# Patient Record
Sex: MALE | Race: OTHER | Employment: OTHER | URBAN - METROPOLITAN AREA
[De-identification: names, ages, dates, MRNs, and addresses within clinical notes are randomized per-mention and may not be internally consistent; named-entity substitution may affect disease eponyms.]

---

## 2017-06-01 ENCOUNTER — TELEPHONE (OUTPATIENT)
Dept: INTERNAL MEDICINE CLINIC | Age: 77
End: 2017-06-01

## 2017-06-01 DIAGNOSIS — Z01.00 EYE EXAM, ROUTINE: Primary | ICD-10-CM

## 2017-12-20 ENCOUNTER — OFFICE VISIT (OUTPATIENT)
Dept: INTERNAL MEDICINE CLINIC | Age: 77
End: 2017-12-20

## 2017-12-20 VITALS
BODY MASS INDEX: 25.48 KG/M2 | HEART RATE: 73 BPM | HEIGHT: 63 IN | OXYGEN SATURATION: 98 % | SYSTOLIC BLOOD PRESSURE: 120 MMHG | RESPIRATION RATE: 19 BRPM | WEIGHT: 143.8 LBS | DIASTOLIC BLOOD PRESSURE: 62 MMHG | TEMPERATURE: 98.4 F

## 2017-12-20 DIAGNOSIS — Z00.00 MEDICARE ANNUAL WELLNESS VISIT, SUBSEQUENT: Primary | ICD-10-CM

## 2017-12-20 DIAGNOSIS — Z13.31 SCREENING FOR DEPRESSION: ICD-10-CM

## 2017-12-20 DIAGNOSIS — R97.20 ABNORMAL PSA: ICD-10-CM

## 2017-12-20 DIAGNOSIS — Z13.39 SCREENING FOR ALCOHOLISM: ICD-10-CM

## 2017-12-20 DIAGNOSIS — R73.09 ELEVATED HEMOGLOBIN A1C: ICD-10-CM

## 2017-12-20 RX ORDER — METRONIDAZOLE 10 MG/G
GEL TOPICAL DAILY
COMMUNITY
End: 2017-12-20 | Stop reason: CLARIF

## 2017-12-20 RX ORDER — METRONIDAZOLE 7.5 MG/G
CREAM TOPICAL
COMMUNITY
End: 2022-09-05

## 2017-12-20 RX ORDER — DOXYCYCLINE 50 MG/1
50 TABLET ORAL
COMMUNITY
End: 2022-09-02 | Stop reason: ALTCHOICE

## 2017-12-20 RX ORDER — DOXYCYCLINE 100 MG/1
100 CAPSULE ORAL
COMMUNITY
End: 2017-12-20 | Stop reason: SDUPTHER

## 2017-12-20 NOTE — PROGRESS NOTES
Follow Up Visit    Guanaco Leija is a 65 y.o. male. he presents for Annual Wellness Visit  . The patient reports feeling well. No complaints. Reviewed the input of the PharmD's note. He will need follow up with urology given persistent elevation of PSA    Patient Active Problem List   Diagnosis Code    BPH (benign prostatic hyperplasia) N40.0         Prior to Admission medications    Medication Sig Start Date End Date Taking? Authorizing Provider   multivitamin (ONE A DAY) tablet Take 1 Tab by mouth daily. Historical Provider   aspirin delayed-release 81 mg tablet Take  by mouth daily. Historical Provider   psyllium (METAMUCIL) packet Take 1 packet by mouth daily. Historical Provider         Health Maintenance   Topic Date Due    ZOSTER VACCINE AGE 60>  06/27/1999    GLAUCOMA SCREENING Q2Y  08/27/2004    Pneumococcal 65+ Low/Medium Risk (1 of 2 - PCV13) 08/27/2004    Influenza Age 9 to Adult  08/01/2017    MEDICARE YEARLY EXAM  12/21/2018    DTaP/Tdap/Td series (2 - Td) 10/21/2025       Review of Systems   Constitutional: Negative. Respiratory: Negative. Cardiovascular: Negative. Visit Vitals    /62 (BP 1 Location: Right arm, BP Patient Position: Sitting)    Pulse 73    Temp 98.4 °F (36.9 °C) (Oral)    Resp 19    Ht 5' 3\" (1.6 m)    Wt 143 lb 12.8 oz (65.2 kg)    SpO2 98%    BMI 25.47 kg/m2       Physical Exam   Constitutional: He appears well-nourished. Cardiovascular: Normal rate and intact distal pulses. Pulmonary/Chest: Effort normal and breath sounds normal.         ASSESSMENT/PLAN    Diagnoses and all orders for this visit:    1. Medicare annual wellness visit, subsequent  -     METABOLIC PANEL, COMPREHENSIVE  -     CBC WITH AUTOMATED DIFF  -     LIPID PANEL    2. Elevated hemoglobin A1c  -     HEMOGLOBIN A1C WITH EAG    3. Abnormal PSA  -     PROSTATE SPECIFIC AG    4. Screening for alcoholism    5.  Screening for depression  -     Depression Screen Annual        Follow-up Disposition:  Return in about 6 months (around 6/20/2018) for Follow up.

## 2017-12-20 NOTE — PATIENT INSTRUCTIONS
Medicare Part B Preventive Services Limitations Recommendation Scheduled   Cardiovascular Screening Blood Tests (every 5 years)  Total cholesterol, HDL, Triglycerides Order as a panel if possible Last: 11/4/16    Every Year Ordered today   Colorectal Cancer Screening  -Fecal occult blood test (annual)  -Flexible sigmoidoscopy (5y)  -Screening colonoscopy (10y)  -Barium Enema  Last: 8/18/2010    Recommended 10 year repeat Next: 8/2020   Diabetes Screening Tests (at least every 3 years, Medicare covers annually or at 6-month intervals for prediabetic patients)    Fasting blood sugar (FBS) or glucose tolerance test (GTT) Patient must be diagnosed with one of the following:  -Hypertension, Dyslipidemia, obesity, previous impaired FBS or GTT  Or any two of the following: overweight, FH of diabetes, age ? 72, history of gestational diabetes, birth of baby weighing more than 9 pounds Last: 11/4/16    Every year Ordered today   Glaucoma Screening (no USPSTF recommendation) Diabetes mellitus, family history, , age 48 or over,  American, age 72 or over   Every year Per patient went in the last 2 months   Seasonal Influenza Vaccination (annually)    Every Fall Completed   Shingles Vaccination  A Shingrix vaccine series is recommended once in a lifetime after age 61 Please get one at your pharmacy once this is available   Pneumococcal Vaccination (a Prevnar-13 and a Pneumovax after 72)   A prevnar-13 vaccine and then a pneumovax one year later is recommended after you turn 65 Please get a Prevnar-13 vaccine at your pharmacy. Please make an appointment with one of our nurse navigators to discuss advanced medical planning.

## 2017-12-20 NOTE — PROGRESS NOTES
Dr. Nichole Lagunas referred , 1940, a 65 y.o. male for a Medicare Annual Wellness Visit (AWV). This is a Subsequent Medicare Annual Wellness Exam (AWV) (Performed 12 months after IPPE or effective date of Medicare Part B enrollment)    I have reviewed the patient's medical history in detail and updated the computerized patient record. History     Past Medical History:   Diagnosis Date    Snoring       Past Surgical History:   Procedure Laterality Date    HX COLONOSCOPY  8-    normal - return in 10 years    HX VASECTOMY  1983     Current Outpatient Prescriptions   Medication Sig Dispense Refill    metroNIDAZOLE (METROCREAM) 0.75 % topical cream Apply  to affected area daily. Use a thin layer to affected areas after washing      doxycycline (ADOXA) 50 mg tablet Take 50 mg by mouth every seven (7) days.  multivitamin (ONE A DAY) tablet Take 0.5 Tabs by mouth daily.  aspirin delayed-release 81 mg tablet Take 81 mg by mouth daily.  psyllium (METAMUCIL) packet Take 1 Packet by mouth every other day. Allergies   Allergen Reactions    Other Food Other (comments)     Black pepper causes skin problems    Alcohol Other (comments)     Rosacea    Venom-Honey Bee Hives     Family History   Problem Relation Age of Onset    Diabetes Father     Heart Disease Father     Hypertension Father     Diabetes Sister     Hypertension Sister      Social History   Substance Use Topics    Smoking status: Never Smoker    Smokeless tobacco: Never Used    Alcohol use No     Patient Active Problem List   Diagnosis Code    BPH (benign prostatic hyperplasia) N40.0       Depression Risk Factor Screening:     PHQ over the last two weeks 12/20/2017   Little interest or pleasure in doing things Not at all   Feeling down, depressed or hopeless Not at all   Total Score PHQ 2 0     Alcohol Risk Factor Screening: You do not drink alcohol or very rarely.     Functional Ability and Level of Safety:   Hearing Loss  Hearing is good. Activities of Daily Living  The home contains: no safety equipment. Patient does total self care    Fall Risk  Fall Risk Assessment, last 12 mths 12/20/2017   Able to walk? Yes   Fall in past 12 months? No       Abuse Screen  Patient is not abused    Cognitive Screening   Evaluation of Cognitive Function:  Has your family/caregiver stated any concerns about your memory: no    Patient Care Team   Patient Care Team:  Liu Canales MD as PCP - General (Internal Medicine)  Liu Canales MD (Internal Medicine)  Maya House MD (Gastroenterology)  Jennifer Shay MD (Dermatology)    Assessment/Plan   Education and counseling provided:  Are appropriate based on today's review and evaluation  End-of-Life planning (with patient's consent)  Pneumococcal Vaccine  Colorectal cancer screening tests  Cardiovascular screening blood test  Screening for glaucoma  Diabetes screening test   Shingles vaccine    Diagnoses and all orders for this visit:    1. Medicare annual wellness visit, subsequent  -     METABOLIC PANEL, COMPREHENSIVE  -     CBC WITH AUTOMATED DIFF  -     LIPID PANEL    2. Elevated hemoglobin A1c  -     HEMOGLOBIN A1C WITH EAG    3. Abnormal PSA  -     PROSTATE SPECIFIC AG    4. Screening for alcoholism    5. Screening for depression  -     Depression Screen Annual    6. Patient did no} bring in their medications to the visit. During the patient interview,  the following was noted:  ASA:  Patient states that he takes this infrequently  MVI:  Patient states that he takes 1/2 tablet daily. He feels like he eats a balanced diet that is high in fruits and vegetables  Metronidazole 0.75% cream:  Patient states he applies this daily as needed when his rosacea flares. Doxycycline 50 mg:  Patient states he will use this when his rosacea flares, only uses this once a week, but if it worsens will take more frequently. He feels like this works well for his rosacea    7.   Screenings (see patient instructions for chart):  PSA: ordered today  Colonoscopy: Up to date, due for repeat in 8/2020  Glaucoma screening/Eye exam: Patient states that he went to [facility] in the last 2 weeks, sent fax to get OV note  Lipid panel: Due, ordered today  Diabetes: Due, ordered today    8. Immunizations:  Pneumococcal:  Recommended that patient receive Prevnar-13 followed by PPSV23 1 year later. Influenza:  Completed  Zoster:  Recommended that patient receive the Shingrix series once it is available at their pharmacy. Tdap:  Completed    9. Advanced Care Planning:  Patient does not currently has advanced care plan. Recommended that they set up an appointment with [facility] to discuss. Patient verbalized understanding of information presented. Answered all of the patient's questions. AVS handed and reviewed with patient.     Horris Pallas, PharmD, BCACP    Health Maintenance Due   Topic Date Due    ZOSTER VACCINE AGE 60>  06/27/1999    GLAUCOMA SCREENING Q2Y  08/27/2004    Pneumococcal 65+ Low/Medium Risk (1 of 2 - PCV13) 08/27/2004

## 2017-12-20 NOTE — MR AVS SNAPSHOT
Visit Information Date & Time Provider Department Dept. Phone Encounter #  
 12/20/2017 11:15 AM Annalisa Velarde MD [facility] Internal Medicine [phone] [id number] Upcoming Health Maintenance Date Due ZOSTER VACCINE AGE 60> 6/27/1999 GLAUCOMA SCREENING Q2Y 8/27/2004 Pneumococcal 65+ Low/Medium Risk (1 of 2 - PCV13) 8/27/2004 Influenza Age 5 to Adult 8/1/2017 MEDICARE YEARLY EXAM 12/21/2018 DTaP/Tdap/Td series (2 - Td) 10/21/2025 Allergies as of 12/20/2017  Review Complete On: 12/20/2017 By: Faisal Griffith PHARMD  
  
 Severity Noted Reaction Type Reactions Other Food  12/17/2014    Other (comments) Black pepper causes skin problems Alcohol  12/17/2014    Other (comments) Rosacea Venom-honey Bee  03/17/2015    Hives Current Immunizations  Reviewed on 12/20/2017 Name Date Influenza High Dose Vaccine PF 10/21/2017, 10/15/2014 Td 1/1/2014 Tdap 1/1/2014 Reviewed by Faisal Griffith PHARMD on 12/20/2017 at 12:40 PM  
You Were Diagnosed With   
  
 Codes Comments Medicare annual wellness visit, subsequent    -  Primary ICD-10-CM: Z00.00 ICD-9-CM: V70.0 Elevated hemoglobin A1c     ICD-10-CM: R73.09 
ICD-9-CM: 790.29 Abnormal PSA     ICD-10-CM: R97.20 ICD-9-CM: 795.89 Vitals BP Pulse Temp Resp Height(growth percentile) Weight(growth percentile) 120/62 (BP 1 Location: Right arm, BP Patient Position: Sitting) 73 98.4 °F (36.9 °C) (Oral) 19 5' 3\" (1.6 m) 143 lb 12.8 oz (65.2 kg) SpO2 BMI Smoking Status 98% 25.47 kg/m2 Never Smoker Vitals History BMI and BSA Data Body Mass Index Body Surface Area  
 25.47 kg/m 2 1.7 m 2 Preferred Pharmacy Pharmacy Name Phone [id number] [address] Sol[address], [address] [facility] [phone] Your Updated Medication List  
  
   
This list is accurate as of: 12/20/17 12:51 PM.  Always use your most recent med list.  
  
  
  
  
 aspirin delayed-release 81 mg tablet Take  by mouth daily. doxycycline 100 mg capsule Commonly known as:  Edjake Romance Take 100 mg by mouth every seven (7) days. During rosacea flare-up METROGEL 1 % topical gel Generic drug:  metroNIDAZOLE Apply  to affected area daily. Use a thin layer to affected areas after washing  
  
 multivitamin tablet Commonly known as:  ONE A DAY Take 0.5 Tabs by mouth daily. psyllium packet Commonly known as:  METAMUCIL Take 1 Packet by mouth every other day. We Performed the Following CBC WITH AUTOMATED DIFF [43328 CPT(R)] HEMOGLOBIN A1C WITH EAG [15524 CPT(R)] LIPID PANEL [78532 CPT(R)] METABOLIC PANEL, COMPREHENSIVE [31172 CPT(R)] PSA, DIAGNOSTIC (PROSTATE SPECIFIC AG) [id number] CPT(R)] Patient Instructions Medicare Part B Preventive Services Limitations Recommendation Scheduled Cardiovascular Screening Blood Tests (every 5 years) Total cholesterol, HDL, Triglycerides Order as a panel if possible Last: 11/4/16 Every Year Ordered today Colorectal Cancer Screening 
-Fecal occult blood test (annual) -Flexible sigmoidoscopy (5y) 
-Screening colonoscopy (10y) -Barium Enema  Last: 8/18/2010 Recommended 10 year repeat Next: 8/2020 Diabetes Screening Tests (at least every 3 years, Medicare covers annually or at 6-month intervals for prediabetic patients) Fasting blood sugar (FBS) or glucose tolerance test (GTT) Patient must be diagnosed with one of the following: 
-Hypertension, Dyslipidemia, obesity, previous impaired FBS or GTT 
Or any two of the following: overweight, FH of diabetes, age ? 72, history of gestational diabetes, birth of baby weighing more than 9 pounds Last: 11/4/16 Every year Ordered today Glaucoma Screening (no USPSTF recommendation) Diabetes mellitus, family history, , age 48 or over,  American, age 72 or over Every year Per patient went in the last 2 months Seasonal Influenza Vaccination (annually) Every Fall Completed Shingles Vaccination  A Shingrix vaccine series is recommended once in a lifetime after age 61 Please get one at your pharmacy once this is available Pneumococcal Vaccination (a Prevnar-13 and a Pneumovax after 65)   A prevnar-13 vaccine and then a pneumovax one year later is recommended after you turn 65 Please get a Prevnar-13 vaccine at your pharmacy. Please make an appointment with one of our nurse navigators to discuss advanced medical planning. Introducing [facility]! [employer] introduces Socialite patient portal. Now you can access parts of your medical record, email your doctor's office, and request medication refills online. 1. In your internet browser, go to [email or web address] 2. Click on the First Time User? Click Here link in the Sign In box. You will see the New Member Sign Up page. 3. Enter your Socialite Access Code exactly as it appears below. You will not need to use this code after youve completed the sign-up process. If you do not sign up before the expiration date, you must request a new code. · Socialite Access Code: [email or web address] Expires: 3/20/2018 12:51 PM 
 
4. Enter the last four digits of your Social Security Number (xxxx) and Date of Birth (mm/dd/yyyy) as indicated and click Submit. You will be taken to the next sign-up page. 5. Create a Socialite ID. This will be your Socialite login ID and cannot be changed, so think of one that is secure and easy to remember. 6. Create a Socialite password. You can change your password at any time. 7. Enter your Password Reset Question and Answer. This can be used at a later time if you forget your password. 8. Enter your e-mail address. You will receive e-mail notification when new information is available in [address] 9. Click Sign Up. You can now view and download portions of your medical record. 10. Click the Download Summary menu link to download a portable copy of your medical information. If you have questions, please visit the Frequently Asked Questions section of the [employer] website. Remember, [employer] is NOT to be used for urgent needs. For medical emergencies, dial 911. Now available from your iPhone and Android! Please provide this summary of care documentation to your next provider. Your primary care clinician is listed as Patrick Redmond. If you have any questions after today's visit, please call [phone].

## 2017-12-21 ENCOUNTER — HOSPITAL ENCOUNTER (OUTPATIENT)
Dept: LAB | Age: 77
Discharge: HOME | End: 2017-12-21
Payer: MEDICARE

## 2017-12-21 PROCEDURE — 36415 COLL VENOUS BLD VENIPUNCTURE: CPT

## 2017-12-21 PROCEDURE — 84153 ASSAY OF PSA TOTAL: CPT

## 2017-12-21 PROCEDURE — 83036 HEMOGLOBIN GLYCOSYLATED A1C: CPT

## 2017-12-21 PROCEDURE — 80053 COMPREHEN METABOLIC PANEL: CPT

## 2017-12-21 PROCEDURE — 80061 LIPID PANEL: CPT

## 2017-12-21 PROCEDURE — 85025 COMPLETE CBC W/AUTO DIFF WBC: CPT

## 2017-12-22 LAB
ALBUMIN SERPL-MCNC: 4.5 G/DL (ref 3.5–4.8)
ALBUMIN/GLOB SERPL: 1.7 {RATIO} (ref 1.2–2.2)
ALP SERPL-CCNC: 59 IU/L (ref 39–117)
ALT SERPL-CCNC: 24 IU/L (ref 0–44)
AST SERPL-CCNC: 23 IU/L (ref 0–40)
BASOPHILS # BLD AUTO: 0 X10E3/UL (ref 0–0.2)
BASOPHILS NFR BLD AUTO: 1 %
BILIRUB SERPL-MCNC: 0.5 MG/DL (ref 0–1.2)
BUN SERPL-MCNC: 13 MG/DL (ref 8–27)
BUN/CREAT SERPL: 12 (ref 10–24)
CALCIUM SERPL-MCNC: 9.8 MG/DL (ref 8.6–10.2)
CHLORIDE SERPL-SCNC: 101 MMOL/L (ref 96–106)
CHOLEST SERPL-MCNC: 180 MG/DL (ref 100–199)
CO2 SERPL-SCNC: 24 MMOL/L (ref 18–29)
CREAT SERPL-MCNC: 1.11 MG/DL (ref 0.76–1.27)
EOSINOPHIL # BLD AUTO: 0.2 X10E3/UL (ref 0–0.4)
EOSINOPHIL NFR BLD AUTO: 4 %
ERYTHROCYTE [DISTWIDTH] IN BLOOD BY AUTOMATED COUNT: 12.2 % (ref 12.3–15.4)
EST. AVERAGE GLUCOSE BLD GHB EST-MCNC: 117 MG/DL
GLOBULIN SER CALC-MCNC: 2.6 G/DL (ref 1.5–4.5)
GLUCOSE SERPL-MCNC: 98 MG/DL (ref 65–99)
HBA1C MFR BLD: 5.7 % (ref 4.8–5.6)
HCT VFR BLD AUTO: 44.1 % (ref 37.5–51)
HDLC SERPL-MCNC: 36 MG/DL
HGB BLD-MCNC: 15.1 G/DL (ref 13–17.7)
IMM GRANULOCYTES # BLD: 0 X10E3/UL (ref 0–0.1)
IMM GRANULOCYTES NFR BLD: 0 %
LDLC SERPL CALC-MCNC: 107 MG/DL (ref 0–99)
LYMPHOCYTES # BLD AUTO: 2 X10E3/UL (ref 0.7–3.1)
LYMPHOCYTES NFR BLD AUTO: 38 %
MCH RBC QN AUTO: 31.2 PG (ref 26.6–33)
MCHC RBC AUTO-ENTMCNC: 34.2 G/DL (ref 31.5–35.7)
MCV RBC AUTO: 91 FL (ref 79–97)
MONOCYTES # BLD AUTO: 0.6 X10E3/UL (ref 0.1–0.9)
MONOCYTES NFR BLD AUTO: 12 %
NEUTROPHILS # BLD AUTO: 2.4 X10E3/UL (ref 1.4–7)
NEUTROPHILS NFR BLD AUTO: 45 %
PLATELET # BLD AUTO: 189 X10E3/UL (ref 150–379)
POTASSIUM SERPL-SCNC: 4.6 MMOL/L (ref 3.5–5.2)
PROT SERPL-MCNC: 7.1 G/DL (ref 6–8.5)
PSA SERPL-MCNC: 7.6 NG/ML (ref 0–4)
RBC # BLD AUTO: 4.84 X10E6/UL (ref 4.14–5.8)
SODIUM SERPL-SCNC: 140 MMOL/L (ref 134–144)
TRIGL SERPL-MCNC: 187 MG/DL (ref 0–149)
VLDLC SERPL CALC-MCNC: 37 MG/DL (ref 5–40)
WBC # BLD AUTO: 5.3 X10E3/UL (ref 3.4–10.8)

## 2017-12-28 ENCOUNTER — DOCUMENTATION ONLY (OUTPATIENT)
Dept: INTERNAL MEDICINE CLINIC | Age: 77
End: 2017-12-28

## 2018-05-09 ENCOUNTER — OFFICE VISIT (OUTPATIENT)
Dept: INTERNAL MEDICINE CLINIC | Age: 78
End: 2018-05-09

## 2018-05-09 VITALS
TEMPERATURE: 97.7 F | DIASTOLIC BLOOD PRESSURE: 58 MMHG | WEIGHT: 142 LBS | BODY MASS INDEX: 25.16 KG/M2 | SYSTOLIC BLOOD PRESSURE: 140 MMHG | OXYGEN SATURATION: 97 % | RESPIRATION RATE: 17 BRPM | HEART RATE: 67 BPM | HEIGHT: 63 IN

## 2018-05-09 DIAGNOSIS — M79.18 GLUTEAL PAIN: Primary | ICD-10-CM

## 2018-05-09 RX ORDER — ACETAMINOPHEN 500 MG
TABLET ORAL
COMMUNITY
End: 2021-06-16

## 2018-05-09 RX ORDER — CYCLOBENZAPRINE HCL 5 MG
5 TABLET ORAL
Qty: 30 TAB | Refills: 0 | Status: SHIPPED | OUTPATIENT
Start: 2018-05-09 | End: 2018-07-05 | Stop reason: ALTCHOICE

## 2018-05-09 NOTE — PATIENT INSTRUCTIONS
For now, please take the flexeril and Alleve twice a day (two pills at a time) for the next 7 days. Let me know if not improving.

## 2018-05-09 NOTE — MR AVS SNAPSHOT
[facility] [address] [place] [address] 
[phone] Patient: Sunil Hutchins MRN: [id number] :1940 Visit Information Date & Time Provider Department Dept. Phone Encounter #  
 2018 11:45 AM Anca Castillo MD [address] [address] Internal Medicine [phone] [id number] Upcoming Health Maintenance Date Due ZOSTER VACCINE AGE 60> 1999 GLAUCOMA SCREENING Q2Y 2004 Pneumococcal 65+ Low/Medium Risk (1 of 2 - PCV13) 2004 Influenza Age 5 to Adult 2018 MEDICARE YEARLY EXAM 2018 DTaP/Tdap/Td series (2 - Td) 10/21/2025 Allergies as of 2018  Review Complete On: 2018 By: Anca Castillo MD  
  
 Severity Noted Reaction Type Reactions Other Food  2014    Other (comments) Black pepper causes skin problems Alcohol  2014    Other (comments) Rosacea Venom-honey Bee  2015    Hives Current Immunizations  Reviewed on 2017 Name Date Influenza High Dose Vaccine PF 10/21/2017, 10/15/2014 Td 2014 Tdap 2014 Not reviewed this visit You Were Diagnosed With   
  
 Codes Comments Gluteal pain    -  Primary ICD-10-CM: M79.1 ICD-9-CM: 729.1 Vitals BP Pulse Temp Resp Height(growth percentile) Weight(growth percentile) 140/58 (BP 1 Location: Right arm, BP Patient Position: Sitting) 67 97.7 °F (36.5 °C) (Oral) 17 5' 3\" (1.6 m) 142 lb (64.4 kg) SpO2 BMI Smoking Status 97% 25.15 kg/m2 Never Smoker Vitals History BMI and BSA Data Body Mass Index Body Surface Area  
 25.15 kg/m 2 1.69 m 2 Preferred Pharmacy Pharmacy Name Phone [id number] Mike Leija, [address] [facility] [phone] Your Updated Medication List  
  
   
This list is accurate as of 18 12:31 PM.  Always use your most recent med list.  
  
  
  
  
 aspirin delayed-release 81 mg tablet Take 81 mg by mouth daily. cyclobenzaprine 5 mg tablet Commonly known as:  FLEXERIL Take 1 Tab by mouth three (3) times daily as needed for Muscle Spasm(s). doxycycline 50 mg tablet Commonly known as:  ADOXA Take 50 mg by mouth every seven (7) days. During rosacea flares  
  
 metroNIDAZOLE 0.75 % topical cream  
Commonly known as:  METROCREAM  
Apply  to affected area daily as needed. Use a thin layer to affected areas after washing  
  
 multivitamin tablet Commonly known as:  ONE A DAY Take 0.5 Tabs by mouth daily. psyllium packet Commonly known as:  METAMUCIL Take 1 Packet by mouth every other day. TYLENOL EXTRA STRENGTH 500 mg tablet Generic drug:  acetaminophen Take  by mouth every six (6) hours as needed for Pain. Prescriptions Sent to Pharmacy Refills  
 cyclobenzaprine (FLEXERIL) 5 mg tablet 0 Sig: Take 1 Tab by mouth three (3) times daily as needed for Muscle Spasm(s). Class: Normal  
 Pharmacy: [facility]/pharmacy [address] [place] [address] #: [phone] Route: Oral  
  
Patient Instructions For now, please take the flexeril and Alleve twice a day (two pills at a time) for the next 7 days. Let me know if not improving. Introducing [facility]! Dear Lolis Manzo: Thank you for requesting a [employer] account. Our records indicate that you already have an active [employer] account. You can access your account anytime at [email or web address] Did you know that you can access your hospital and ER discharge instructions at any time in [employer]? You can also review all of your test results from your hospital stay or ER visit. Additional Information If you have questions, please visit the Frequently Asked Questions section of the [employer] website at [email or web address] Remember, MyChart is NOT to be used for urgent needs. For medical emergencies, dial 911. Now available from your iPhone and Android! Please provide this summary of care documentation to your next provider. Your primary care clinician is listed as Sonja Romano. If you have any questions after today's visit, please call [phone].

## 2018-07-05 ENCOUNTER — OFFICE VISIT (OUTPATIENT)
Dept: INTERNAL MEDICINE CLINIC | Age: 78
End: 2018-07-05

## 2018-07-05 VITALS
HEIGHT: 63 IN | WEIGHT: 141.2 LBS | BODY MASS INDEX: 25.02 KG/M2 | DIASTOLIC BLOOD PRESSURE: 68 MMHG | SYSTOLIC BLOOD PRESSURE: 130 MMHG | HEART RATE: 67 BPM | TEMPERATURE: 97 F | OXYGEN SATURATION: 98 % | RESPIRATION RATE: 16 BRPM

## 2018-07-05 DIAGNOSIS — M70.71 BURSITIS OF RIGHT HIP, UNSPECIFIED BURSA: ICD-10-CM

## 2018-07-05 DIAGNOSIS — L03.031 PARONYCHIA, TOE, RIGHT: Primary | ICD-10-CM

## 2018-07-05 DIAGNOSIS — N28.1 BILATERAL RENAL CYSTS: ICD-10-CM

## 2018-07-05 RX ORDER — MELOXICAM 15 MG/1
15 TABLET ORAL
COMMUNITY
Start: 2018-06-21 | End: 2021-06-16

## 2018-07-05 NOTE — PROGRESS NOTES
Acute Care Note    Gerhard Schultz is 65 y.o. male. he presents for evaluation of Bursitis and Cyst    The patient comes for discussion of his recent orthopedics evaluation. He has been seen by [address] [place] [address] for the previous nearly one month for continuous hip pain on the right. He tells me that initially, he was prescribed physical therapy which did not seem to help him much. Indeed, he appeared to worsen. An MRI of the lumbar spine was performed which documented evidence of spinal stenosis present at the levels of L4-L5. Based upon this, the orthopedic office has recommended continued physical therapy. However, they are considering an injection of the lumbar spine. Additionally, the patient does have evidence of a few small bilateral renal bright lesions on the T2 image per radiology. Other radiology read, these are thought to be consistent with cysts. The patient was very concerned about these and came to discuss them. He has no urologic issues presently. He does follow up with urology episodically for an elevated PSA. He has had biopsies which have not shown any abnormalities. His last interaction with urology was in 2011. He also mentions irritation to the right fifth toe. He tells me that he had seen podiatry for this already and had been prescribed an antibiotic cream it appears. He used the cream and had some improvement of the toe. However, he notes that this discomfort is still slightly present. Prior to Admission medications    Medication Sig Start Date End Date Taking? Authorizing Provider   meloxicam (MOBIC) 15 mg tablet Take 15 mg by mouth. 6/21/18  Yes Historical Provider   acetaminophen (TYLENOL EXTRA STRENGTH) 500 mg tablet Take  by mouth every six (6) hours as needed for Pain. Yes Historical Provider   multivitamin (ONE A DAY) tablet Take 0.5 Tabs by mouth daily. Yes Historical Provider   psyllium (METAMUCIL) packet Take 1 Packet by mouth every other day.    Yes Historical Provider   metroNIDAZOLE (METROCREAM) 0.75 % topical cream Apply  to affected area daily as needed. Use a thin layer to affected areas after washing     Historical Provider   doxycycline (ADOXA) 50 mg tablet Take 50 mg by mouth every seven (7) days. During rosacea flares    Historical Provider   aspirin delayed-release 81 mg tablet Take 81 mg by mouth daily. Historical Provider         Patient Active Problem List   Diagnosis Code    BPH (benign prostatic hyperplasia) N40.0         Review of Systems   Constitutional: Negative. Cardiovascular: Negative. Musculoskeletal:        Right hip pain as per HPI         Visit Vitals    /68 (BP 1 Location: Right arm, BP Patient Position: Sitting)    Pulse 67    Temp 97 °F (36.1 °C) (Oral)    Resp 16    Ht 5' 3\" (1.6 m)    Wt 141 lb 3.2 oz (64 kg)    SpO2 98%    BMI 25.01 kg/m2       Physical Exam   Constitutional: He appears well-developed and well-nourished. Musculoskeletal:   Mildly erythematous and swollen fifth toe on the right foot. This is mildly tender to palpation           ASSESSMENT/PLAN  Diagnoses and all orders for this visit:    1. Paronychia, toe, right -this appears to be mild at this time. Advised use of Neosporin gel on the site twice a day. He will monitor the toe    2. Bursitis of right hip, unspecified bursa -the patient will continue or to follow-up. He was advised to again continue as per the specialist recommendations. 3. Bilateral renal cysts -per the imaging report which the patient furnished to me today, these appear as if they are simple cysts. We will follow up with radiology to ensure they do not appear to be concerning. Advised the patient to call back or return to office if symptoms worsen/change/persist.   Discussed expected course/resolution/complications of diagnosis in detail with patient. Medication risks/benefits/costs/interactions/alternatives discussed with patient.    The patient was given an after visit summary which includes diagnoses, current medications, & vitals. They expressed understanding with the diagnosis and plan.

## 2018-07-09 ENCOUNTER — TELEPHONE (OUTPATIENT)
Dept: INTERNAL MEDICINE CLINIC | Age: 78
End: 2018-07-09

## 2018-07-09 DIAGNOSIS — Q61.02 MULTIPLE RENAL CYSTS: Primary | ICD-10-CM

## 2018-07-09 NOTE — TELEPHONE ENCOUNTER
----- Message from Andrew Norton MD sent at 7/6/2018  1:15 PM EDT -----  Please inform the patient that I spoke to the radiologist.  Although he does feel that the cysts are benign and not of any consequence. He did mention that we could rule this out entirely with a dedicated renal ultrasound. I suggest that we do this just to be sure that there is no kidney issue that needs attention. If patient agrees, please order bilateral renal ultrasound to evaluate renal cysts seen on MRI.        Patient has been informed per previous note

## 2018-07-13 ENCOUNTER — HOSPITAL ENCOUNTER (OUTPATIENT)
Dept: ULTRASOUND IMAGING | Age: 78
Discharge: HOME | End: 2018-07-13
Attending: INTERNAL MEDICINE
Payer: MEDICARE

## 2018-07-13 DIAGNOSIS — Q61.02 MULTIPLE RENAL CYSTS: ICD-10-CM

## 2018-07-13 PROCEDURE — 76770 US EXAM ABDO BACK WALL COMP: CPT

## 2018-07-23 ENCOUNTER — TELEPHONE (OUTPATIENT)
Dept: INTERNAL MEDICINE CLINIC | Age: 78
End: 2018-07-23

## 2018-07-23 NOTE — TELEPHONE ENCOUNTER
Patient asked that you fax kidney US results and latest labwork to Dr. Doug Lyons, urologist    Fax# [phone]    Appt on August 1st

## 2018-07-23 NOTE — TELEPHONE ENCOUNTER
Faxed testing results to US results and latest labwork to Dr. Santosh Flores, urologist     Fax# [phone]     Appt on August 1st

## 2019-07-30 ENCOUNTER — TELEPHONE (OUTPATIENT)
Dept: INTERNAL MEDICINE CLINIC | Age: 79
End: 2019-07-30

## 2019-07-30 DIAGNOSIS — Z00.00 ROUTINE GENERAL MEDICAL EXAMINATION AT A HEALTH CARE FACILITY: ICD-10-CM

## 2019-07-30 DIAGNOSIS — N40.0 BENIGN PROSTATIC HYPERPLASIA, UNSPECIFIED WHETHER LOWER URINARY TRACT SYMPTOMS PRESENT: Primary | ICD-10-CM

## 2019-07-30 DIAGNOSIS — Z00.00 ENCOUNTER FOR MEDICARE ANNUAL WELLNESS EXAM: ICD-10-CM

## 2019-07-30 NOTE — TELEPHONE ENCOUNTER
Patient has appt for a wellness visit in Sept and is requesting a labslip be put at the  -- he will get bloodwork done a week prior to the appt.

## 2019-09-17 ENCOUNTER — HOSPITAL ENCOUNTER (OUTPATIENT)
Dept: LAB | Age: 79
Discharge: HOME | End: 2019-09-17
Payer: MEDICARE

## 2019-09-17 PROCEDURE — 83036 HEMOGLOBIN GLYCOSYLATED A1C: CPT

## 2019-09-17 PROCEDURE — 80061 LIPID PANEL: CPT

## 2019-09-17 PROCEDURE — 85025 COMPLETE CBC W/AUTO DIFF WBC: CPT

## 2019-09-17 PROCEDURE — 80053 COMPREHEN METABOLIC PANEL: CPT

## 2019-09-17 PROCEDURE — 36415 COLL VENOUS BLD VENIPUNCTURE: CPT

## 2019-09-18 LAB
ALBUMIN SERPL-MCNC: 4.7 G/DL (ref 3.5–4.7)
ALBUMIN/GLOB SERPL: 2 {RATIO} (ref 1.2–2.2)
ALP SERPL-CCNC: 59 IU/L (ref 39–117)
ALT SERPL-CCNC: 20 IU/L (ref 0–44)
AST SERPL-CCNC: 22 IU/L (ref 0–40)
BASOPHILS # BLD AUTO: 0 X10E3/UL (ref 0–0.2)
BASOPHILS NFR BLD AUTO: 1 %
BILIRUB SERPL-MCNC: 0.6 MG/DL (ref 0–1.2)
BUN SERPL-MCNC: 10 MG/DL (ref 8–27)
BUN/CREAT SERPL: 10 (ref 10–24)
CALCIUM SERPL-MCNC: 9.7 MG/DL (ref 8.6–10.2)
CHLORIDE SERPL-SCNC: 101 MMOL/L (ref 96–106)
CHOLEST SERPL-MCNC: 197 MG/DL (ref 100–199)
CO2 SERPL-SCNC: 24 MMOL/L (ref 20–29)
CREAT SERPL-MCNC: 1.05 MG/DL (ref 0.76–1.27)
EOSINOPHIL # BLD AUTO: 0.2 X10E3/UL (ref 0–0.4)
EOSINOPHIL NFR BLD AUTO: 3 %
ERYTHROCYTE [DISTWIDTH] IN BLOOD BY AUTOMATED COUNT: 11.9 % (ref 12.3–15.4)
EST. AVERAGE GLUCOSE BLD GHB EST-MCNC: 120 MG/DL
GLOBULIN SER CALC-MCNC: 2.3 G/DL (ref 1.5–4.5)
GLUCOSE SERPL-MCNC: 102 MG/DL (ref 65–99)
HBA1C MFR BLD: 5.8 % (ref 4.8–5.6)
HCT VFR BLD AUTO: 45.9 % (ref 37.5–51)
HDLC SERPL-MCNC: 33 MG/DL
HGB BLD-MCNC: 15.8 G/DL (ref 13–17.7)
IMM GRANULOCYTES # BLD AUTO: 0 X10E3/UL (ref 0–0.1)
IMM GRANULOCYTES NFR BLD AUTO: 0 %
LDLC SERPL CALC-MCNC: 100 MG/DL (ref 0–99)
LYMPHOCYTES # BLD AUTO: 1.8 X10E3/UL (ref 0.7–3.1)
LYMPHOCYTES NFR BLD AUTO: 31 %
MCH RBC QN AUTO: 30.9 PG (ref 26.6–33)
MCHC RBC AUTO-ENTMCNC: 34.4 G/DL (ref 31.5–35.7)
MCV RBC AUTO: 90 FL (ref 79–97)
MONOCYTES # BLD AUTO: 0.6 X10E3/UL (ref 0.1–0.9)
MONOCYTES NFR BLD AUTO: 11 %
NEUTROPHILS # BLD AUTO: 3.2 X10E3/UL (ref 1.4–7)
NEUTROPHILS NFR BLD AUTO: 54 %
PLATELET # BLD AUTO: 197 X10E3/UL (ref 150–450)
POTASSIUM SERPL-SCNC: 4.5 MMOL/L (ref 3.5–5.2)
PROT SERPL-MCNC: 7 G/DL (ref 6–8.5)
PSA SERPL-MCNC: 6.5 NG/ML (ref 0–4)
RBC # BLD AUTO: 5.11 X10E6/UL (ref 4.14–5.8)
SODIUM SERPL-SCNC: 142 MMOL/L (ref 134–144)
TRIGL SERPL-MCNC: 320 MG/DL (ref 0–149)
VLDLC SERPL CALC-MCNC: 64 MG/DL (ref 5–40)
WBC # BLD AUTO: 5.8 X10E3/UL (ref 3.4–10.8)

## 2019-09-26 ENCOUNTER — OFFICE VISIT (OUTPATIENT)
Dept: INTERNAL MEDICINE CLINIC | Age: 79
End: 2019-09-26

## 2019-09-26 VITALS
HEIGHT: 63 IN | HEART RATE: 62 BPM | TEMPERATURE: 98.2 F | WEIGHT: 140.4 LBS | RESPIRATION RATE: 18 BRPM | DIASTOLIC BLOOD PRESSURE: 60 MMHG | OXYGEN SATURATION: 96 % | BODY MASS INDEX: 24.88 KG/M2 | SYSTOLIC BLOOD PRESSURE: 101 MMHG

## 2019-09-26 DIAGNOSIS — Z23 ENCOUNTER FOR IMMUNIZATION: ICD-10-CM

## 2019-09-26 DIAGNOSIS — Z00.00 MEDICARE ANNUAL WELLNESS VISIT, SUBSEQUENT: Primary | ICD-10-CM

## 2019-09-26 RX ORDER — FLUOCINONIDE 0.5 MG/G
CREAM TOPICAL
Refills: 0 | COMMUNITY
Start: 2019-08-16 | End: 2021-06-16

## 2019-09-26 RX ORDER — LATANOPROST 50 UG/ML
SOLUTION/ DROPS OPHTHALMIC
COMMUNITY
Start: 2019-08-20

## 2019-09-26 RX ORDER — HYDROXYZINE HYDROCHLORIDE 10 MG/1
TABLET, FILM COATED ORAL
Refills: 0 | COMMUNITY
Start: 2019-08-30 | End: 2021-06-16

## 2019-09-26 NOTE — PATIENT INSTRUCTIONS
Vaccine Information Statement Influenza (Flu) Vaccine (Inactivated or Recombinant): What You Need to Know Many Vaccine Information Statements are available in Estonian and other languages. See www.immunize.org/vis Hojas de información sobre vacunas están disponibles en español y en muchos otros idiomas. Visite www.immunize.org/vis 1. Why get vaccinated? Influenza vaccine can prevent influenza (flu). Flu is a contagious disease that spreads around the [facility] every year, usually between October and May. Anyone can get the flu, but it is more dangerous for some people. Infants and young children, people 72years of age and older, pregnant women, and people with certain health conditions or a weakened immune system are at greatest risk of flu complications. Pneumonia, bronchitis, sinus infections and ear infections are examples of flu-related complications. If you have a medical condition, such as heart disease, cancer or diabetes, flu can make it worse. Flu can cause fever and chills, sore throat, muscle aches, fatigue, cough, headache, and runny or stuffy nose. Some people may have vomiting and diarrhea, though this is more common in children than adults. Each year thousands of people in the [facility] die from flu, and many more are hospitalized. Flu vaccine prevents millions of illnesses and flu-related visits to the doctor each year. 2. Influenza vaccines CDC recommends everyone 10months of age and older get vaccinated every flu season. Children 6 months through 6years of age may need 2 doses during a single flu season. Everyone else needs only 1 dose each flu season. It takes about 2 weeks for protection to develop after vaccination. There are many flu viruses, and they are always changing. Each year a new flu vaccine is made to protect against three or four viruses that are likely to cause disease in the upcoming flu season.  Even when the vaccine doesnt exactly match these viruses, it may still provide some protection. Influenza vaccine does not cause flu. Influenza vaccine may be given at the same time as other vaccines. 3. Talk with your health care provider Tell your vaccine provider if the person getting the vaccine: 
 Has had an allergic reaction after a previous dose of influenza vaccine, or has any severe, life-threatening allergies.  Has ever had Guillain-Barré Syndrome (also called GBS). In some cases, your health care provider may decide to postpone influenza vaccination to a future visit. People with minor illnesses, such as a cold, may be vaccinated. People who are moderately or severely ill should usually wait until they recover before getting influenza vaccine. Your health care provider can give you more information. 4. Risks of a reaction  Soreness, redness, and swelling where shot is given, fever, muscle aches, and headache can happen after influenza vaccine.  There may be a very small increased risk of Guillain-Barré Syndrome (GBS) after inactivated influenza vaccine (the flu shot). Irl Pae children who get the flu shot along with pneumococcal vaccine (PCV13), and/or DTaP vaccine at the same time might be slightly more likely to have a seizure caused by fever. Tell your health care provider if a child who is getting flu vaccine has ever had a seizure. People sometimes faint after medical procedures, including vaccination. Tell your provider if you feel dizzy or have vision changes or ringing in the ears. As with any medicine, there is a very remote chance of a vaccine causing a severe allergic reaction, other serious injury, or death. 5. What if there is a serious problem? An allergic reaction could occur after the vaccinated person leaves the clinic.  If you see signs of a severe allergic reaction (hives, swelling of the face and throat, difficulty breathing, a fast heartbeat, dizziness, or weakness), call 9-1-1 and get the person to the nearest hospital. 
 
For other signs that concern you, call your health care provider. Adverse reactions should be reported to the Vaccine Adverse Event Reporting System (VAERS). Your health care provider will usually file this report, or you can do it yourself. Visit the VAERS website at www.vaers. hhs.gov or call 0-896.444.9219. VAERS is only for reporting reactions, and VAERS staff do not give medical advice. 6. The National Vaccine Injury Compensation Program 
 
The [facility] Vaccine Injury Compensation Program (VICP) is a federal program that was created to compensate people who may have been injured by certain vaccines. Visit the VICP website at www.[facility].gov/vaccinecompensation or call 9-477.336.1751 to learn about the program and about filing a claim. There is a time limit to file a claim for compensation. 7. How can I learn more?  Ask your health care provider.  Call your local or state health department.  Contact the Centers for Disease Control and Prevention (CDC): 
- Call 6-590.192.2692 (4-549-FLO-INFO) or 
- Visit CDCs influenza website at www.cdc.gov/flu Vaccine Information Statement (Interim) Inactivated Influenza Vaccine 8/15/2019 
[address] Orlinda Sandhoff 751AH-20 Department of Health and [employer] Centers for Disease Control and Prevention Office Use Only Medicare Wellness Visit, Male The best way to live healthy is to have a lifestyle where you eat a well-balanced diet, exercise regularly, limit alcohol use, and quit all forms of tobacco/nicotine, if applicable. Regular preventive services are another way to keep healthy. Preventive services (vaccines, screening tests, monitoring & exams) can help personalize your care plan, which helps you manage your own care. Screening tests can find health problems at the earliest stages, when they are easiest to treat. [address] [facility] follows the current, evidence-based guidelines published by the [facility] Oswald Medina ([facility]) when recommending preventive services for our patients. Because we follow these guidelines, sometimes recommendations change over time as research supports it. (For example, a prostate screening blood test is no longer routinely recommended for men with no symptoms.) Of course, you and your doctor may decide to screen more often for some diseases, based on your risk and co-morbidities (chronic disease you are already diagnosed with). Preventive services for you include: - Medicare offers their members a free annual wellness visit, which is time for you and your primary care provider to discuss and plan for your preventive service needs. Take advantage of this benefit every year! 
-All adults over age 72 should receive the recommended pneumonia vaccines. Current USPSTF guidelines recommend a series of two vaccines for the best pneumonia protection.  
-All adults should have a flu vaccine yearly and an ECG. All adults age 61 and older should receive a shingles vaccine once in their lifetime.   
-All adults age 38-68 who are overweight should have a diabetes screening test once every three years.  
-Other screening tests & preventive services for persons with diabetes include: an eye exam to screen for diabetic retinopathy, a kidney function test, a foot exam, and stricter control over your cholesterol.  
-Cardiovascular screening for adults with routine risk involves an electrocardiogram (ECG) at intervals determined by the provider.  
-Colorectal cancer screening should be done for adults age 54-65 with no increased risk factors for colorectal cancer. There are a number of acceptable methods of screening for this type of cancer. Each test has its own benefits and drawbacks.  Discuss with your provider what is most appropriate for you during your annual wellness visit. The different tests include: colonoscopy (considered the best screening method), a fecal occult blood test, a fecal DNA test, and sigmoidoscopy. 
-All adults born between [facility] should be screened once for Hepatitis C. 
-An Abdominal Aortic Aneurysm (AAA) Screening is recommended for men age 73-68 who has ever smoked in their lifetime. Here is a list of your current Health Maintenance items (your personalized list of preventive services) with a due date: 
Health Maintenance Due Topic Date Due  Shingles Vaccine (1 of 2) 08/27/1989  Pneumococcal Vaccine (1 of 2 - PCV13) 08/27/2004 [facility] Annual Well Visit  12/21/2018  Flu Vaccine  08/01/2019

## 2019-09-26 NOTE — PROGRESS NOTES
1. Have you been to the ER, urgent care clinic since your last visit? Hospitalized since your last visit? No    2. Have you seen or consulted any other health care providers outside of the [address] [place] [address] since your last visit? Include any pap smears or colon screening. Trish Healy Oksana Carroll is a [de-identified] y.o. male who presents for routine immunizations. He denies any symptoms , reactions or allergies that would exclude them from being immunized today. Risks and adverse reactions were discussed and the VIS was given to them. All questions were addressed. He was observed for 5 min post injection. There were no reactions observed.     Juan Jean

## 2019-09-26 NOTE — PROGRESS NOTES
This is the Subsequent Medicare Annual Wellness Exam, performed 12 months or more after the Initial AWV or the last Subsequent AWV    I have reviewed the patient's medical history in detail and updated the computerized patient record. The patient reports doing well. No acute issues. He has some joint aches which are mainly controlled with NSAIDs. Remains on Mobic. History     Past Medical History:   Diagnosis Date    Snoring       Past Surgical History:   Procedure Laterality Date    HX COLONOSCOPY  8-    normal - return in 10 years    HX VASECTOMY  1983     Current Outpatient Medications   Medication Sig Dispense Refill    fluocinoNIDE (LIDEX) 0.05 % topical cream   0    latanoprost (XALATAN) 0.005 % ophthalmic solution       hydrOXYzine HCl (ATARAX) 10 mg tablet TAKE 1 TO 3 TABLETS BY MOUTH ONCE DAILY AT BEDTIME  0    metroNIDAZOLE (METROCREAM) 0.75 % topical cream Apply  to affected area daily as needed. Use a thin layer to affected areas after washing       doxycycline (ADOXA) 50 mg tablet Take 50 mg by mouth every seven (7) days. During rosacea flares      aspirin delayed-release 81 mg tablet Take 81 mg by mouth daily.  psyllium (METAMUCIL) packet Take 1 Packet by mouth every other day.  meloxicam (MOBIC) 15 mg tablet Take 15 mg by mouth.  acetaminophen (TYLENOL EXTRA STRENGTH) 500 mg tablet Take  by mouth every six (6) hours as needed for Pain.  multivitamin (ONE A DAY) tablet Take 0.5 Tabs by mouth daily.        Allergies   Allergen Reactions    Other Food Other (comments)     Black pepper causes skin problems    Alcohol Other (comments)     Rosacea    Venom-Honey Bee Hives     Family History   Problem Relation Age of Onset    Diabetes Father     Heart Disease Father     Hypertension Father     Diabetes Sister     Hypertension Sister      Social History     Tobacco Use    Smoking status: Never Smoker    Smokeless tobacco: Never Used   Substance Use Topics    Alcohol use: No     Patient Active Problem List   Diagnosis Code    BPH (benign prostatic hyperplasia) N40.0       Depression Risk Factor Screening:     3 most recent PHQ Screens 9/26/2019   Little interest or pleasure in doing things Not at all   Feeling down, depressed, irritable, or hopeless Not at all   Total Score PHQ 2 0     Alcohol Risk Factor Screening: You do not drink alcohol or very rarely. Functional Ability and Level of Safety:   Hearing Loss  Hearing is good. Activities of Daily Living  The home contains: no safety equipment. Patient does total self care    Fall Risk  Fall Risk Assessment, last 12 mths 9/26/2019   Able to walk? Yes   Fall in past 12 months? No       Abuse Screen  Patient is not abused    Cognitive Screening   Evaluation of Cognitive Function:  Has your family/caregiver stated any concerns about your memory: no      Patient Care Team   Patient Care Team:  Manuelito Horn MD as PCP - General (Internal Medicine)  Manuelito Horn MD (Internal Medicine)  Judith Rivera MD (Gastroenterology)  Harsh Solo MD (Dermatology)    Assessment/Plan   Education and counseling provided:  Are appropriate based on today's review and evaluation    Diagnoses and all orders for this visit:    1. Medicare annual wellness visit, subsequent    2. Encounter for immunization  -     varicella-zoster recombinant, PF, (SHINGRIX, PF,) 50 mcg/0.5 mL susr injection; 0.5 mL by IntraMUSCular route once for 1 dose. -     pneumococcal 13 isabel conj dip (PREVNAR-13) 0.5 mL syrg injection; 0.5 mL by IntraMUSCular route once for 1 dose.   -     INFLUENZA VACCINE INACTIVATED (IIV), SUBUNIT, ADJUVANTED, IM  -     ADMIN INFLUENZA VIRUS VAC        Health Maintenance Due   Topic Date Due    Shingrix Vaccine Age 49> (1 of 2) 08/27/1989    Pneumococcal 65+ years (1 of 2 - PCV13) 08/27/2004    MEDICARE YEARLY EXAM  12/21/2018    Influenza Age 9 to Adult  08/01/2019

## 2020-06-10 ENCOUNTER — TELEPHONE (OUTPATIENT)
Dept: INTERNAL MEDICINE CLINIC | Age: 80
End: 2020-06-10

## 2020-06-10 DIAGNOSIS — R73.09 ELEVATED HEMOGLOBIN A1C: Primary | ICD-10-CM

## 2020-06-10 DIAGNOSIS — E78.00 ELEVATED CHOLESTEROL: ICD-10-CM

## 2020-06-11 ENCOUNTER — OFFICE VISIT (OUTPATIENT)
Dept: INTERNAL MEDICINE CLINIC | Age: 80
End: 2020-06-11

## 2020-06-11 ENCOUNTER — HOSPITAL ENCOUNTER (OUTPATIENT)
Dept: LAB | Age: 80
Discharge: HOME | End: 2020-06-11
Payer: MEDICARE

## 2020-06-11 VITALS — TEMPERATURE: 97.8 F

## 2020-06-11 DIAGNOSIS — Z00.00 MEDICARE ANNUAL WELLNESS VISIT, SUBSEQUENT: Primary | ICD-10-CM

## 2020-06-11 PROCEDURE — 83036 HEMOGLOBIN GLYCOSYLATED A1C: CPT

## 2020-06-11 PROCEDURE — 36415 COLL VENOUS BLD VENIPUNCTURE: CPT

## 2020-06-11 PROCEDURE — 80053 COMPREHEN METABOLIC PANEL: CPT

## 2020-06-11 PROCEDURE — 80061 LIPID PANEL: CPT

## 2020-06-11 PROCEDURE — 85025 COMPLETE CBC W/AUTO DIFF WBC: CPT

## 2020-06-12 LAB
ALBUMIN SERPL-MCNC: 4.6 G/DL (ref 3.7–4.7)
ALBUMIN/GLOB SERPL: 1.8 {RATIO} (ref 1.2–2.2)
ALP SERPL-CCNC: 61 IU/L (ref 39–117)
ALT SERPL-CCNC: 17 IU/L (ref 0–44)
AST SERPL-CCNC: 19 IU/L (ref 0–40)
BASOPHILS # BLD AUTO: 0 X10E3/UL (ref 0–0.2)
BASOPHILS NFR BLD AUTO: 1 %
BILIRUB SERPL-MCNC: 0.6 MG/DL (ref 0–1.2)
BUN SERPL-MCNC: 13 MG/DL (ref 8–27)
BUN/CREAT SERPL: 12 (ref 10–24)
CALCIUM SERPL-MCNC: 10 MG/DL (ref 8.6–10.2)
CHLORIDE SERPL-SCNC: 101 MMOL/L (ref 96–106)
CHOLEST SERPL-MCNC: 190 MG/DL (ref 100–199)
CO2 SERPL-SCNC: 22 MMOL/L (ref 20–29)
CREAT SERPL-MCNC: 1.13 MG/DL (ref 0.76–1.27)
EOSINOPHIL # BLD AUTO: 0.4 X10E3/UL (ref 0–0.4)
EOSINOPHIL NFR BLD AUTO: 6 %
ERYTHROCYTE [DISTWIDTH] IN BLOOD BY AUTOMATED COUNT: 11.9 % (ref 11.6–15.4)
EST. AVERAGE GLUCOSE BLD GHB EST-MCNC: 120 MG/DL
GLOBULIN SER CALC-MCNC: 2.5 G/DL (ref 1.5–4.5)
GLUCOSE SERPL-MCNC: 93 MG/DL (ref 65–99)
HBA1C MFR BLD: 5.8 % (ref 4.8–5.6)
HCT VFR BLD AUTO: 45.7 % (ref 37.5–51)
HDLC SERPL-MCNC: 38 MG/DL
HGB BLD-MCNC: 15.7 G/DL (ref 13–17.7)
IMM GRANULOCYTES # BLD AUTO: 0 X10E3/UL (ref 0–0.1)
IMM GRANULOCYTES NFR BLD AUTO: 0 %
LDLC SERPL CALC-MCNC: 110 MG/DL (ref 0–99)
LYMPHOCYTES # BLD AUTO: 1.9 X10E3/UL (ref 0.7–3.1)
LYMPHOCYTES NFR BLD AUTO: 30 %
MCH RBC QN AUTO: 30.4 PG (ref 26.6–33)
MCHC RBC AUTO-ENTMCNC: 34.4 G/DL (ref 31.5–35.7)
MCV RBC AUTO: 88 FL (ref 79–97)
MONOCYTES # BLD AUTO: 0.7 X10E3/UL (ref 0.1–0.9)
MONOCYTES NFR BLD AUTO: 10 %
NEUTROPHILS # BLD AUTO: 3.3 X10E3/UL (ref 1.4–7)
NEUTROPHILS NFR BLD AUTO: 53 %
PLATELET # BLD AUTO: 172 X10E3/UL (ref 150–450)
POTASSIUM SERPL-SCNC: 4.3 MMOL/L (ref 3.5–5.2)
PROT SERPL-MCNC: 7.1 G/DL (ref 6–8.5)
RBC # BLD AUTO: 5.17 X10E6/UL (ref 4.14–5.8)
SODIUM SERPL-SCNC: 140 MMOL/L (ref 134–144)
TRIGL SERPL-MCNC: 209 MG/DL (ref 0–149)
VLDLC SERPL CALC-MCNC: 42 MG/DL (ref 5–40)
WBC # BLD AUTO: 6.3 X10E3/UL (ref 3.4–10.8)

## 2020-06-16 ENCOUNTER — HOSPITAL ENCOUNTER (OUTPATIENT)
Dept: GENERAL RADIOLOGY | Age: 80
Discharge: HOME | End: 2020-06-16
Attending: INTERNAL MEDICINE
Payer: MEDICARE

## 2020-06-16 ENCOUNTER — OFFICE VISIT (OUTPATIENT)
Dept: INTERNAL MEDICINE CLINIC | Age: 80
End: 2020-06-16

## 2020-06-16 VITALS
RESPIRATION RATE: 18 BRPM | DIASTOLIC BLOOD PRESSURE: 74 MMHG | HEART RATE: 70 BPM | OXYGEN SATURATION: 96 % | BODY MASS INDEX: 24.91 KG/M2 | WEIGHT: 140.6 LBS | SYSTOLIC BLOOD PRESSURE: 138 MMHG | HEIGHT: 63 IN | TEMPERATURE: 97.5 F

## 2020-06-16 DIAGNOSIS — N40.1 BENIGN PROSTATIC HYPERPLASIA WITH NOCTURIA: ICD-10-CM

## 2020-06-16 DIAGNOSIS — Z00.00 MEDICARE ANNUAL WELLNESS VISIT, SUBSEQUENT: Primary | ICD-10-CM

## 2020-06-16 DIAGNOSIS — R35.1 BENIGN PROSTATIC HYPERPLASIA WITH NOCTURIA: ICD-10-CM

## 2020-06-16 DIAGNOSIS — R09.89 CHEST CRACKLES: ICD-10-CM

## 2020-06-16 PROCEDURE — 71046 X-RAY EXAM CHEST 2 VIEWS: CPT

## 2020-06-16 RX ORDER — BRIMONIDINE TARTRATE, TIMOLOL MALEATE 2; 5 MG/ML; MG/ML
1 SOLUTION/ DROPS OPHTHALMIC EVERY 12 HOURS
COMMUNITY
End: 2021-06-16

## 2020-06-16 RX ORDER — DIPHENHYDRAMINE HCL 25 MG
CAPSULE ORAL AS NEEDED
COMMUNITY
End: 2021-06-16

## 2020-06-16 RX ORDER — TRIFLURIDINE 1 G/100ML
1 SOLUTION OPHTHALMIC
COMMUNITY
End: 2021-06-16

## 2020-06-16 NOTE — PROGRESS NOTES
Chief Complaint   Patient presents with   [facility] Annual Wellness Visit     1. Have you been to the ER, urgent care clinic since your last visit? Hospitalized since your last visit? Yes Where: Urgent Care Reason for visit: red eye/ulcer in cornea    2. Have you seen or consulted any other health care providers outside of the [address] [place] [address] since your last visit? Include any pap smears or colon screening. Yes Where: Dr Hdz/Opthalmologist Reason for visit: eye problem    Allergic reaction, condition before, using ointment.  Right shoulder, left axillary, naval, lower back and in left thigh

## 2020-06-16 NOTE — PROGRESS NOTES
This is a Subsequent Medicare Annual Wellness Visit providing Personalized Prevention Plan Services (PPPS) (Performed 12 months after initial AWV and PPPS )    I have reviewed the patient's medical history in detail and updated the computerized patient record. He reports feeling generally well at this time. He has a complaint of some nocturia. He is awake about every 2 hours with this. We discussed it and he is not wanting to take medication for this. He will continue to monitor the symptom. He has a history of BPH. He has seen urology in the past.      He otherwise is following with optho for a possible corneal ulcer. He is on eyedrops and continues to follow up. He has a sensation of \"fluttering\" or a \"murmur\" at night when he breathes. No pain, no shortness of breath. He reports that this is primarily at night. He does not have a cough although he feels the flutter clears when he coughs. History     Past Medical History:   Diagnosis Date    Snoring       Past Surgical History:   Procedure Laterality Date    HX COLONOSCOPY  8-    normal - return in 10 years    HX VASECTOMY  1983     Current Outpatient Medications   Medication Sig Dispense Refill    brimonidine-timoloL (Combigan) 0.2-0.5 % drop ophthalmic solution 1 Drop every twelve (12) hours.  dextran 70-hypromellose (Artificial Tears,[id number],) ophthalmic solution Administer  to both eyes as needed.  trifluridine (VIROPTIC) 1 % ophthalmic solution Administer 1 Drop to both eyes every two (2) hours.  fluocinoNIDE (LIDEX) 0.05 % topical cream   0    latanoprost (XALATAN) 0.005 % ophthalmic solution       hydrOXYzine HCl (ATARAX) 10 mg tablet TAKE 1 TO 3 TABLETS BY MOUTH ONCE DAILY AT BEDTIME  0    metroNIDAZOLE (METROCREAM) 0.75 % topical cream Apply  to affected area daily as needed. Use a thin layer to affected areas after washing       multivitamin (ONE A DAY) tablet Take 0.5 Tabs by mouth daily.       aspirin delayed-release 81 mg tablet Take 81 mg by mouth daily.  psyllium (METAMUCIL) packet Take 1 Packet by mouth every other day.  meloxicam (MOBIC) 15 mg tablet Take 15 mg by mouth.  acetaminophen (TYLENOL EXTRA STRENGTH) 500 mg tablet Take  by mouth every six (6) hours as needed for Pain.  doxycycline (ADOXA) 50 mg tablet Take 50 mg by mouth every seven (7) days. During rosacea flares       Allergies   Allergen Reactions    Other Food Other (comments)     Black pepper causes skin problems    Alcohol Other (comments)     Rosacea    Venom-Honey Bee Hives     Family History   Problem Relation Age of Onset    Diabetes Father     Heart Disease Father     Hypertension Father     Diabetes Sister     Hypertension Sister      Social History     Tobacco Use    Smoking status: Never Smoker    Smokeless tobacco: Never Used   Substance Use Topics    Alcohol use: No     Patient Active Problem List   Diagnosis Code    BPH (benign prostatic hyperplasia) N40.0       Depression Risk Factor Screening:     3 most recent PHQ Screens 6/16/2020   Little interest or pleasure in doing things Not at all   Feeling down, depressed, irritable, or hopeless Not at all   Total Score PHQ 2 0     Alcohol Risk Factor Screening: You do not drink alcohol or very rarely. Functional Ability and Level of Safety:     Hearing Loss   Hearing is good. Activities of Daily Living   Self-care. Requires assistance with: no ADLs    Fall Risk     Fall Risk Assessment, last 12 mths 6/16/2020   Able to walk? Yes   Fall in past 12 months? No     Abuse Screen   Patient is not abused    Review of Systems   A comprehensive review of systems was negative except for that written in the HPI.     Physical Examination     Evaluation of Cognitive Function:  Mood/affect:  happy  Appearance: age appropriate  Family member/caregiver input: none    Visit Vitals  /74 (BP 1 Location: Right arm, BP Patient Position: Sitting) Pulse 70   Temp 97.5 °F (36.4 °C) (Temporal)   Resp 18   Ht 5' 2.56\" (1.589 m)   Wt 140 lb 9.6 oz (63.8 kg)   SpO2 96%   BMI 25.26 kg/m²     General appearance: alert, cooperative, no distress, appears stated age  Lungs: clear to auscultation bilaterally  Heart: regular rate and rhythm, S1, S2 normal, no murmur, click, rub or gallop  Skin: small papule present on the left shoulder and near the navel. Erythematous and non-tender. Patient Care Team:  Shobha Buenrostro MD as PCP - General (Internal Medicine)  Shobha Buenrostro MD as PCP - [address] [place] [address] Provider  Shobha Buenrostro MD (Internal Medicine)  Maria Victoria Anderosn MD (Gastroenterology)  Trudy Bernal MD (Dermatology)    Advice/Referrals/Counseling   Education and counseling provided:  Are appropriate based on today's review and evaluation      Assessment/Plan   Diagnoses and all orders for this visit:    1. Medicare annual wellness visit, subsequent    2. Chest crackles  -     XR CHEST PA LAT; Future    3. Benign prostatic hyperplasia with nocturia      Follow-up and Dispositions    · Return in about 3 months (around 9/16/2020) for Follow up. Tori Aparicio

## 2020-07-01 ENCOUNTER — TELEPHONE (OUTPATIENT)
Dept: INTERNAL MEDICINE CLINIC | Age: 80
End: 2020-07-01

## 2020-07-01 NOTE — TELEPHONE ENCOUNTER
Patient complains of pain on side of head,comes and goes. Not present yesterday ,today just a tinge of pain. Does patient need to have evaluation? When laying down, normal breathing but hear crackling sound from chest. Cough and sort of clears. Xray done. Was to consult with cardiology and have not heard follow up.

## 2020-07-23 ENCOUNTER — TELEPHONE (OUTPATIENT)
Dept: INTERNAL MEDICINE CLINIC | Age: 80
End: 2020-07-23

## 2020-08-19 ENCOUNTER — TELEPHONE (OUTPATIENT)
Dept: INTERNAL MEDICINE CLINIC | Age: 80
End: 2020-08-19

## 2020-08-19 NOTE — TELEPHONE ENCOUNTER
Patient states due to heart condition recommended taking Lipitor, but was hesitant. Last night experienced pain in left chest, sweating. Took 2 aspirin tab. Patient is requesting to start Lipitor.

## 2020-08-19 NOTE — TELEPHONE ENCOUNTER
Patient states that last night he experienced left side chest pain and sweating. Decided to do some push ups and took 2 aspirin and went to sleep. Is not having any symptoms this morning. Per Enrico Cohn, send message to call back.

## 2020-08-20 ENCOUNTER — TELEPHONE (OUTPATIENT)
Dept: INTERNAL MEDICINE CLINIC | Age: 80
End: 2020-08-20

## 2020-08-20 NOTE — TELEPHONE ENCOUNTER
Patient is asking whether Dr. Noe Acosta got his message about wanting to start on Lipitor. Please call.

## 2020-08-21 RX ORDER — ATORVASTATIN CALCIUM 40 MG/1
40 TABLET, FILM COATED ORAL DAILY
Qty: 30 TAB | Refills: 3 | Status: SHIPPED | OUTPATIENT
Start: 2020-08-21 | End: 2020-10-14 | Stop reason: SDUPTHER

## 2020-10-14 RX ORDER — ATORVASTATIN CALCIUM 40 MG/1
40 TABLET, FILM COATED ORAL DAILY
Qty: 90 TAB | Refills: 0 | Status: SHIPPED | COMMUNITY
Start: 2020-10-14 | End: 2020-11-23 | Stop reason: SDUPTHER

## 2020-10-14 NOTE — TELEPHONE ENCOUNTER
Requested Prescriptions     Pending Prescriptions Disp Refills    atorvastatin (LIPITOR) 40 mg tablet 30 Tab 3     Sig: Take 1 Tab by mouth daily.       [place], [address] to Sarah Tabares [id number]  [phone]      94 day supply

## 2020-11-23 RX ORDER — ATORVASTATIN CALCIUM 40 MG/1
40 TABLET, FILM COATED ORAL DAILY
Qty: 90 TAB | Refills: 0 | Status: SHIPPED
Start: 2020-11-23 | End: 2021-06-16

## 2021-01-29 ENCOUNTER — IMMUNIZATION (OUTPATIENT)
Dept: FAMILY MEDICINE CLINIC | Age: 81
End: 2021-01-29
Payer: MEDICARE

## 2021-01-29 DIAGNOSIS — Z23 ENCOUNTER FOR IMMUNIZATION: Primary | ICD-10-CM

## 2021-01-29 PROCEDURE — 91301 COVID-19, MRNA, LNP-S, PF, 100MCG/0.5ML DOSE(MODERNA): CPT | Performed by: FAMILY MEDICINE

## 2021-01-29 PROCEDURE — 0011A PR IMM ADMN SARSCOV2 100 MCG/0.5 ML 1ST DOSE: CPT | Performed by: FAMILY MEDICINE

## 2021-03-01 ENCOUNTER — IMMUNIZATION (OUTPATIENT)
Dept: FAMILY MEDICINE CLINIC | Age: 81
End: 2021-03-01
Payer: MEDICARE

## 2021-03-01 DIAGNOSIS — Z23 ENCOUNTER FOR IMMUNIZATION: Primary | ICD-10-CM

## 2021-03-01 PROCEDURE — 91301 COVID-19, MRNA, LNP-S, PF, 100MCG/0.5ML DOSE(MODERNA): CPT | Performed by: FAMILY MEDICINE

## 2021-03-01 PROCEDURE — 0012A COVID-19, MRNA, LNP-S, PF, 100MCG/0.5ML DOSE(MODERNA): CPT | Performed by: FAMILY MEDICINE

## 2021-06-02 DIAGNOSIS — N40.1 BENIGN PROSTATIC HYPERPLASIA WITH NOCTURIA: Primary | ICD-10-CM

## 2021-06-02 DIAGNOSIS — Z00.00 ENCOUNTER FOR ANNUAL WELLNESS EXAM IN MEDICARE PATIENT: ICD-10-CM

## 2021-06-02 DIAGNOSIS — R35.1 BENIGN PROSTATIC HYPERPLASIA WITH NOCTURIA: Primary | ICD-10-CM

## 2021-06-02 DIAGNOSIS — Z00.00 ENCOUNTER FOR SUBSEQUENT ANNUAL WELLNESS VISIT (AWV) IN MEDICARE PATIENT: ICD-10-CM

## 2021-06-15 ENCOUNTER — HOSPITAL ENCOUNTER (OUTPATIENT)
Dept: GENERAL RADIOLOGY | Age: 81
Discharge: HOME | End: 2021-06-15
Attending: INTERNAL MEDICINE
Payer: MEDICARE

## 2021-06-15 ENCOUNTER — OFFICE VISIT (OUTPATIENT)
Dept: INTERNAL MEDICINE CLINIC | Age: 81
End: 2021-06-15
Attending: INTERNAL MEDICINE
Payer: MEDICARE

## 2021-06-15 ENCOUNTER — TELEPHONE (OUTPATIENT)
Dept: INTERNAL MEDICINE CLINIC | Age: 81
End: 2021-06-15

## 2021-06-15 VITALS
BODY MASS INDEX: 24.56 KG/M2 | DIASTOLIC BLOOD PRESSURE: 68 MMHG | OXYGEN SATURATION: 98 % | TEMPERATURE: 97.5 F | HEIGHT: 63 IN | HEART RATE: 59 BPM | WEIGHT: 138.6 LBS | RESPIRATION RATE: 14 BRPM | SYSTOLIC BLOOD PRESSURE: 120 MMHG

## 2021-06-15 DIAGNOSIS — Z13.39 SCREENING FOR ALCOHOLISM: ICD-10-CM

## 2021-06-15 DIAGNOSIS — R07.89 CHEST FULLNESS: ICD-10-CM

## 2021-06-15 DIAGNOSIS — Z00.00 MEDICARE ANNUAL WELLNESS VISIT, SUBSEQUENT: Primary | ICD-10-CM

## 2021-06-15 DIAGNOSIS — F41.9 ANXIETY: ICD-10-CM

## 2021-06-15 PROCEDURE — 1101F PT FALLS ASSESS-DOCD LE1/YR: CPT | Performed by: INTERNAL MEDICINE

## 2021-06-15 PROCEDURE — G8536 NO DOC ELDER MAL SCRN: HCPCS | Performed by: INTERNAL MEDICINE

## 2021-06-15 PROCEDURE — G8420 CALC BMI NORM PARAMETERS: HCPCS | Performed by: INTERNAL MEDICINE

## 2021-06-15 PROCEDURE — G8427 DOCREV CUR MEDS BY ELIG CLIN: HCPCS | Performed by: INTERNAL MEDICINE

## 2021-06-15 PROCEDURE — G8510 SCR DEP NEG, NO PLAN REQD: HCPCS | Performed by: INTERNAL MEDICINE

## 2021-06-15 PROCEDURE — 71046 X-RAY EXAM CHEST 2 VIEWS: CPT

## 2021-06-15 PROCEDURE — G0439 PPPS, SUBSEQ VISIT: HCPCS | Performed by: INTERNAL MEDICINE

## 2021-06-15 RX ORDER — IBUPROFEN 200 MG
200 TABLET ORAL
COMMUNITY
End: 2022-06-16

## 2021-06-15 RX ORDER — KETOTIFEN FUMARATE 0.35 MG/ML
1 SOLUTION/ DROPS OPHTHALMIC 2 TIMES DAILY
COMMUNITY
End: 2022-06-16

## 2021-06-15 NOTE — PROGRESS NOTES
This is the Subsequent Medicare Annual Wellness Exam, performed 12 months or more after the Initial AWV or the last Subsequent AWV    I have reviewed the patient's medical history in detail and updated the computerized patient record. He is follow up with optho--has a history of glaucoma. On eyedrops. Stable per the patient. He has a sensation of itching at his back in the morning--dry skin. Try Eucerin. Had a recent tooth extraction. Cancelled once before for elevated pressure. We discussed that his pressure appears stable at this time. Lying on the left, has some \"discomfort\" described as a fullness in his chest.  He has no cough or shortness of breath. Assessment/Plan   Education and counseling provided:  Are appropriate based on today's review and evaluation    1. Medicare annual wellness visit, subsequent  2. Screening for alcoholism  -     ME ANNUAL ALCOHOL SCREEN 15 MIN  3. Chest fullness  -     XR CHEST PA LAT; Future  4. Anxiety  -     REFERRAL TO PSYCHOLOGY       Depression Risk Factor Screening     3 most recent PHQ Screens 6/15/2021   Little interest or pleasure in doing things Not at all   Feeling down, depressed, irritable, or hopeless Not at all   Total Score PHQ 2 0       Alcohol Risk Screen    Do you average more than 1 drink per night or more than 7 drinks a week: No    In the past three months have you have had more than 4 drinks containing alcohol on one occasion: No        Functional Ability and Level of Safety    Hearing: Hearing is good. Activities of Daily Living: The home contains: no safety equipment. Patient does total self care      Ambulation: with no difficulty     Fall Risk:  Fall Risk Assessment, last 12 mths 6/15/2021   Able to walk? Yes   Fall in past 12 months? 0   Do you feel unsteady?  0   Are you worried about falling 0      Abuse Screen:  Patient is not abused       Cognitive Screening    Has your family/caregiver stated any concerns about your memory: no          Health Maintenance Due     Health Maintenance Due   Topic Date Due    Shingrix Vaccine Age 49> (2 of 2) 01/14/2020       Patient Care Team   Patient Care Team:  Emma Stacy MD as PCP - General (Internal Medicine)  Emma Stacy MD as PCP - [facility] Empaneled Provider  Emma Stacy MD (Internal Medicine)  Tobi Palacio MD (Gastroenterology)  Nixon Cruz MD (Dermatology)    History     Patient Active Problem List   Diagnosis Code    BPH (benign prostatic hyperplasia) N40.0     Past Medical History:   Diagnosis Date    Snoring       Past Surgical History:   Procedure Laterality Date    HX COLONOSCOPY  8-    normal - return in 10 years    HX VASECTOMY  1983     Current Outpatient Medications   Medication Sig Dispense Refill    brimonidine tartrate/timolol (COMBIGAN OP) Apply  to eye. Left eye twice a day      ibuprofen (AdviL) 200 mg tablet Take 200 mg by mouth every six (6) hours as needed for Pain. As needed      OTHER Dry eye omega  Capsules one a day      ketotifen (Zaditor) 0.025 % (0.035 %) ophthalmic solution Administer 1 Drop to both eyes two (2) times a day. As needed      vit C/E/Zn//lut/mikaela/bio/saf (RETAINE VISION PO) Take  by mouth. As needed      latanoprost (XALATAN) 0.005 % ophthalmic solution       metroNIDAZOLE (METROCREAM) 0.75 % topical cream Apply  to affected area daily as needed. Use a thin layer to affected areas after washing       aspirin delayed-release 81 mg tablet Take 81 mg by mouth daily.  psyllium (METAMUCIL) packet Take 1 Packet by mouth every other day.  atorvastatin (LIPITOR) 40 mg tablet Take 1 Tab by mouth daily. 90 Tab 0    brimonidine-timoloL (Combigan) 0.2-0.5 % drop ophthalmic solution 1 Drop every twelve (12) hours.  dextran 70-hypromellose (Artificial Tears,[id number],) ophthalmic solution Administer  to both eyes as needed.       trifluridine (VIROPTIC) 1 % ophthalmic solution Administer 1 Drop to both eyes every two (2) hours.  fluocinoNIDE (LIDEX) 0.05 % topical cream   0    hydrOXYzine HCl (ATARAX) 10 mg tablet TAKE 1 TO 3 TABLETS BY MOUTH ONCE DAILY AT BEDTIME  0    meloxicam (MOBIC) 15 mg tablet Take 15 mg by mouth.  acetaminophen (TYLENOL EXTRA STRENGTH) 500 mg tablet Take  by mouth every six (6) hours as needed for Pain.  doxycycline (ADOXA) 50 mg tablet Take 50 mg by mouth every seven (7) days. During rosacea flares (Patient not taking: Reported on 6/15/2021)      multivitamin (ONE A DAY) tablet Take 0.5 Tabs by mouth daily.  (Patient not taking: Reported on 6/15/2021)       Allergies   Allergen Reactions    Other Food Other (comments)     Black pepper causes skin problems    Alcohol Other (comments)     Rosacea    Venom-Honey Bee Hives       Family History   Problem Relation Age of Onset    Diabetes Father     Heart Disease Father     Hypertension Father     Diabetes Sister     Hypertension Sister      Social History     Tobacco Use    Smoking status: Never Smoker    Smokeless tobacco: Never Used   Substance Use Topics    Alcohol use: No         René Mcpherson MD

## 2021-06-15 NOTE — TELEPHONE ENCOUNTER
Attempted to contact patient to give CXR results. Received voicemail. Left message to give office a call with any question. Javad Mcmullen does have mychart. Results are available in mychart.

## 2021-06-15 NOTE — PROGRESS NOTES
Please let the patient know that his XR is normal.    Monitor his symptoms for now.   Let me know if any changes in symptoms

## 2021-06-15 NOTE — PATIENT INSTRUCTIONS
Medicare Wellness Visit, Male The best way to live healthy is to have a lifestyle where you eat a well-balanced diet, exercise regularly, limit alcohol use, and quit all forms of tobacco/nicotine, if applicable. Regular preventive services are another way to keep healthy. Preventive services (vaccines, screening tests, monitoring & exams) can help personalize your care plan, which helps you manage your own care. Screening tests can find health problems at the earliest stages, when they are easiest to treat. [facility] follows the current, evidence-based guidelines published by the [facility] ([facility]) when recommending preventive services for our patients. Because we follow these guidelines, sometimes recommendations change over time as research supports it. (For example, a prostate screening blood test is no longer routinely recommended for men with no symptoms). Of course, you and your doctor may decide to screen more often for some diseases, based on your risk and co-morbidities (chronic disease you are already diagnosed with). Preventive services for you include: - Medicare offers their members a free annual wellness visit, which is time for you and your primary care provider to discuss and plan for your preventive service needs. Take advantage of this benefit every year! 
-All adults over age 72 should receive the recommended pneumonia vaccines. Current USPSTF guidelines recommend a series of two vaccines for the best pneumonia protection.  
-All adults should have a flu vaccine yearly and tetanus vaccine every 10 years. 
-All adults age 48 and older should receive the shingles vaccines (series of two vaccines).       
-All adults age 38-68 who are overweight should have a diabetes screening test once every three years.  
-Other screening tests & preventive services for persons with diabetes include: an eye exam to screen for diabetic retinopathy, a kidney function test, a foot exam, and stricter control over your cholesterol.  
-Cardiovascular screening for adults with routine risk involves an electrocardiogram (ECG) at intervals determined by the provider.  
-Colorectal cancer screening should be done for adults age 54-65 with no increased risk factors for colorectal cancer. There are a number of acceptable methods of screening for this type of cancer. Each test has its own benefits and drawbacks. Discuss with your provider what is most appropriate for you during your annual wellness visit. The different tests include: colonoscopy (considered the best screening method), a fecal occult blood test, a fecal DNA test, and sigmoidoscopy. 
-All adults born between [facility] should be screened once for Hepatitis C. 
-An Abdominal Aortic Aneurysm (AAA) Screening is recommended for men age 73-68 who has ever smoked in their lifetime. Here is a list of your current Health Maintenance items (your personalized list of preventive services) with a due date: 
Health Maintenance Due Topic Date Due  Shingles Vaccine (2 of 2) 01/14/2020

## 2021-06-15 NOTE — TELEPHONE ENCOUNTER
----- Message from Nikolai Patrick MD sent at 6/15/2021  3:06 PM EDT -----  Please let the patient know that his XR is normal.    Monitor his symptoms for now.   Let me know if any changes in symptoms

## 2021-06-15 NOTE — PROGRESS NOTES
Chief Complaint   [facility]   [facility]     Reviewed record in preparation for visit and have obtained necessary documentation. Identified pt with two pt identifiers(name and ). Health Maintenance Due   Topic    Shingrix Vaccine Age 49> (2 of 2)    Medicare Yearly Exam          Chief Complaint   Patient presents with   [facility] Annual Wellness Visit        Wt Readings from Last 3 Encounters:   06/15/21 138 lb 9.6 oz (62.9 kg)   20 140 lb 9.6 oz (63.8 kg)   19 140 lb 6.4 oz (63.7 kg)     Temp Readings from Last 3 Encounters:   06/15/21 97.5 °F (36.4 °C) (Temporal)   20 97.5 °F (36.4 °C) (Temporal)   20 97.8 °F (36.6 °C) (Temporal)     BP Readings from Last 3 Encounters:   06/15/21 120/68   20 138/74   19 101/60     Pulse Readings from Last 3 Encounters:   06/15/21 (!) 59   20 70   19 62           Learning Assessment:  :     Learning Assessment 2014   PRIMARY LEARNER Patient   HIGHEST LEVEL OF EDUCATION - PRIMARY LEARNER  > 4 YEARS OF COLLEGE   BARRIERS PRIMARY LEARNER NONE   CO-LEARNER CAREGIVER No   PRIMARY LANGUAGE ENGLISH   LEARNER PREFERENCE PRIMARY DEMONSTRATION   ANSWERED BY patient   RELATIONSHIP SELF       Depression Screening:  :     3 most recent PHQ Screens 6/15/2021   Little interest or pleasure in doing things Not at all   Feeling down, depressed, irritable, or hopeless Not at all   Total Score PHQ 2 0       Fall Risk Assessment:  :     Fall Risk Assessment, last 12 mths 6/15/2021   Able to walk? Yes   Fall in past 12 months? 0   Do you feel unsteady? 0   Are you worried about falling 0       Abuse Screening:  :     No flowsheet data found.     Coordination of Care Questionnaire:  :     1) Have you been to an emergency room, urgent care clinic since your last visit? no   Hospitalized since your last visit? no             2) Have you seen or consulted any other health care providers outside of [address] [place] [address] since your last visit? no  (Include any pap smears or colon screenings in this section.)    3) Do you have an Advance Directive on file? no    4) Are you interested in receiving information on Advance Directives? NO      Patient is accompanied by self I have received verbal consent from Jesse Palomino to discuss any/all medical information while they are present in the room. Reviewed record  In preparation for visit and have obtained necessary documentation.

## 2021-12-21 DIAGNOSIS — R19.4 CHANGE IN STOOL HABITS: Primary | ICD-10-CM

## 2022-04-14 ENCOUNTER — TELEPHONE (OUTPATIENT)
Dept: INTERNAL MEDICINE CLINIC | Age: 82
End: 2022-04-14

## 2022-04-14 NOTE — TELEPHONE ENCOUNTER
Patient report swelling on feet, bilateral. Exercise everyday, noticed a week ago. Denies pain. After 5 minutes on treadmill, develop pain back of calf muscle, when stretching pain. Not difficult to walk. Taking baby aspirin. Taking doxycycline prescribed by eye doctor. Patient received MyChart message from Dr. Jailene Ramirez to schedule appt. Patient scheduled 04/20/22. Advise if swelling or pain worsen go to urgent care for evaluation. Patient verbalized understanding.

## 2022-04-20 ENCOUNTER — OFFICE VISIT (OUTPATIENT)
Dept: INTERNAL MEDICINE CLINIC | Age: 82
End: 2022-04-20
Payer: MEDICARE

## 2022-04-20 VITALS
SYSTOLIC BLOOD PRESSURE: 110 MMHG | WEIGHT: 141 LBS | TEMPERATURE: 98.2 F | DIASTOLIC BLOOD PRESSURE: 60 MMHG | BODY MASS INDEX: 24.98 KG/M2 | HEIGHT: 63 IN | HEART RATE: 82 BPM | OXYGEN SATURATION: 98 % | RESPIRATION RATE: 16 BRPM

## 2022-04-20 DIAGNOSIS — M79.10 MUSCLE PAIN: Primary | ICD-10-CM

## 2022-04-20 PROCEDURE — G0463 HOSPITAL OUTPT CLINIC VISIT: HCPCS | Performed by: INTERNAL MEDICINE

## 2022-04-20 PROCEDURE — 1101F PT FALLS ASSESS-DOCD LE1/YR: CPT | Performed by: INTERNAL MEDICINE

## 2022-04-20 PROCEDURE — G8427 DOCREV CUR MEDS BY ELIG CLIN: HCPCS | Performed by: INTERNAL MEDICINE

## 2022-04-20 PROCEDURE — G8419 CALC BMI OUT NRM PARAM NOF/U: HCPCS | Performed by: INTERNAL MEDICINE

## 2022-04-20 PROCEDURE — 99213 OFFICE O/P EST LOW 20 MIN: CPT | Performed by: INTERNAL MEDICINE

## 2022-04-20 PROCEDURE — G8536 NO DOC ELDER MAL SCRN: HCPCS | Performed by: INTERNAL MEDICINE

## 2022-04-20 PROCEDURE — G8510 SCR DEP NEG, NO PLAN REQD: HCPCS | Performed by: INTERNAL MEDICINE

## 2022-04-20 NOTE — PROGRESS NOTES
Chief Complaint   Patient presents with    Leg Pain     calf muscles     Reviewed record in preparation for visit and have obtained necessary documentation. Identified pt with two pt identifiers(name and ). Health Maintenance Due   Topic    Shingrix Vaccine Age 49> (2 of 2)    Pneumococcal 65+ years (2 - PCV)         Chief Complaint   Patient presents with    Leg Pain     calf muscles        Wt Readings from Last 3 Encounters:   22 141 lb (64 kg)   06/15/21 138 lb 9.6 oz (62.9 kg)   20 140 lb 9.6 oz (63.8 kg)     Temp Readings from Last 3 Encounters:   22 98.2 °F (36.8 °C) (Temporal)   06/15/21 97.5 °F (36.4 °C) (Temporal)   20 97.5 °F (36.4 °C) (Temporal)     BP Readings from Last 3 Encounters:   22 110/60   06/15/21 120/68   20 138/74     Pulse Readings from Last 3 Encounters:   22 82   06/15/21 (!) 59   20 70           Learning Assessment:  :     Learning Assessment 2014   PRIMARY LEARNER Patient   HIGHEST LEVEL OF EDUCATION - PRIMARY LEARNER  > 4 YEARS OF COLLEGE   BARRIERS PRIMARY LEARNER NONE   CO-LEARNER CAREGIVER No   PRIMARY LANGUAGE ENGLISH   LEARNER PREFERENCE PRIMARY DEMONSTRATION   ANSWERED BY patient   RELATIONSHIP SELF       Depression Screening:  :     3 most recent PHQ Screens 2022   Little interest or pleasure in doing things Not at all   Feeling down, depressed, irritable, or hopeless Not at all   Total Score PHQ 2 0       Fall Risk Assessment:  :     Fall Risk Assessment, last 12 mths 2022   Able to walk? Yes   Fall in past 12 months? 0   Do you feel unsteady? 0   Are you worried about falling 0       Abuse Screening:  :     Abuse Screening Questionnaire 2022   Do you ever feel afraid of your partner? N   Are you in a relationship with someone who physically or mentally threatens you? N   Is it safe for you to go home?  Y       Coordination of Care Questionnaire:  :     1) Have you been to an emergency room, urgent care clinic since your last visit? no   Hospitalized since your last visit? no             2) Have you seen or consulted any other health care providers outside of [address] [place] [address] since your last visit? yes  (Include any pap smears or colon screenings in this section.)    3) Do you have an Advance Directive on file? no    4) Are you interested in receiving information on Advance Directives? NO      Patient is accompanied by self I have received verbal consent from Michelle Schumacher to discuss any/all medical information while they are present in the room. Reviewed record  In preparation for visit and have obtained necessary documentation.

## 2022-04-20 NOTE — PROGRESS NOTES
Acute Care Note    Maicol Reid is 79 y.o. male. he presents for evaluation of Leg Pain (calf muscles)    The patient reports having pain in his calf muscles bilaterally. He notes that this has been causing modest discomfort at the area for the past 3 weeks. He notes this particularly after doing a treadmill workout. He denies redness at the site, no pain with exertion and he has not had any trauma to the area. We discussed that he has been trying to increase his level of exercise as well as intensity in the past several weeks. Prior to Admission medications    Medication Sig Start Date End Date Taking? Authorizing Provider   OTHER Dry eye omega  Capsules one a day   Yes Provider, Historical   latanoprost (XALATAN) 0.005 % ophthalmic solution  8/20/19  Yes Provider, Historical   metroNIDAZOLE (METROCREAM) 0.75 % topical cream Apply  to affected area daily as needed. Use a thin layer to affected areas after washing    Yes Provider, Historical   doxycycline (ADOXA) 50 mg tablet Take 50 mg by mouth every seven (7) days. During rosacea flares   Yes Provider, Historical   aspirin delayed-release 81 mg tablet Take 81 mg by mouth daily. Yes Provider, Historical   brimonidine tartrate/timolol (COMBIGAN OP) Apply  to eye. Left eye twice a day  Patient not taking: Reported on 4/20/2022    Provider, Historical   ibuprofen (AdviL) 200 mg tablet Take 200 mg by mouth every six (6) hours as needed for Pain. As needed    Provider, Historical   ketotifen (Zaditor) 0.025 % (0.035 %) ophthalmic solution Administer 1 Drop to both eyes two (2) times a day. As needed    Provider, Historical   vit C/E/Zn//lut/mikaela/bio/saf (RETAINE VISION PO) Take  by mouth. As needed  Patient not taking: Reported on 4/20/2022    Provider, Historical   multivitamin (ONE A DAY) tablet Take 0.5 Tabs by mouth daily.   Patient not taking: Reported on 6/15/2021    Provider, Historical   psyllium (METAMUCIL) packet Take 1 Packet by mouth every other day. Patient not taking: Reported on 4/20/2022    Provider, Historical         Patient Active Problem List   Diagnosis Code    BPH (benign prostatic hyperplasia) N40.0         Review of Systems   Constitutional: Negative. Respiratory: Negative. Cardiovascular: Negative. Musculoskeletal: Positive for myalgias. Visit Vitals  /60 (BP 1 Location: Left arm, BP Patient Position: Sitting, BP Cuff Size: Adult)   Pulse 82   Temp 98.2 °F (36.8 °C) (Temporal)   Resp 16   Ht 5' 2.56\" (1.589 m)   Wt 141 lb (64 kg)   SpO2 98%   BMI 25.33 kg/m²       Physical Exam  Constitutional:       Appearance: He is well-developed. Cardiovascular:      Rate and Rhythm: Normal rate and regular rhythm. Pulmonary:      Effort: Pulmonary effort is normal.      Breath sounds: Normal breath sounds. Musculoskeletal:         General: No tenderness (no tenderness to the calves to palpation). Neurological:      Mental Status: He is alert. ASSESSMENT/PLAN  Diagnoses and all orders for this visit:    1. Muscle pain - I suspect that his pain is due to muscle injury from his his exertion and increase in exercise intensity. He will decrease his exercise and follow the pain. If not improving, he will return to the office. Advised the patient to call back or return to office if symptoms worsen/change/persist.   Discussed expected course/resolution/complications of diagnosis in detail with patient. Medication risks/benefits/costs/interactions/alternatives discussed with patient. The patient was given an after visit summary which includes diagnoses, current medications, & vitals. They expressed understanding with the diagnosis and plan.

## 2022-06-16 ENCOUNTER — OFFICE VISIT (OUTPATIENT)
Dept: INTERNAL MEDICINE CLINIC | Age: 82
End: 2022-06-16
Payer: MEDICARE

## 2022-06-16 VITALS
BODY MASS INDEX: 24.66 KG/M2 | RESPIRATION RATE: 16 BRPM | DIASTOLIC BLOOD PRESSURE: 70 MMHG | HEIGHT: 63 IN | WEIGHT: 139.2 LBS | HEART RATE: 70 BPM | TEMPERATURE: 97.5 F | OXYGEN SATURATION: 96 % | SYSTOLIC BLOOD PRESSURE: 130 MMHG

## 2022-06-16 DIAGNOSIS — Z00.00 MEDICARE ANNUAL WELLNESS VISIT, SUBSEQUENT: Primary | ICD-10-CM

## 2022-06-16 DIAGNOSIS — Z13.39 SCREENING FOR ALCOHOLISM: ICD-10-CM

## 2022-06-16 DIAGNOSIS — N40.1 BENIGN PROSTATIC HYPERPLASIA WITH NOCTURIA: ICD-10-CM

## 2022-06-16 DIAGNOSIS — R35.1 BENIGN PROSTATIC HYPERPLASIA WITH NOCTURIA: ICD-10-CM

## 2022-06-16 DIAGNOSIS — R73.03 PREDIABETES: ICD-10-CM

## 2022-06-16 PROCEDURE — 1101F PT FALLS ASSESS-DOCD LE1/YR: CPT | Performed by: INTERNAL MEDICINE

## 2022-06-16 PROCEDURE — G8510 SCR DEP NEG, NO PLAN REQD: HCPCS | Performed by: INTERNAL MEDICINE

## 2022-06-16 PROCEDURE — G0439 PPPS, SUBSEQ VISIT: HCPCS | Performed by: INTERNAL MEDICINE

## 2022-06-16 PROCEDURE — G0442 ANNUAL ALCOHOL SCREEN 15 MIN: HCPCS | Performed by: INTERNAL MEDICINE

## 2022-06-16 PROCEDURE — G8427 DOCREV CUR MEDS BY ELIG CLIN: HCPCS | Performed by: INTERNAL MEDICINE

## 2022-06-16 PROCEDURE — G8536 NO DOC ELDER MAL SCRN: HCPCS | Performed by: INTERNAL MEDICINE

## 2022-06-16 PROCEDURE — G8417 CALC BMI ABV UP PARAM F/U: HCPCS | Performed by: INTERNAL MEDICINE

## 2022-06-16 RX ORDER — DICLOFENAC SODIUM 75 MG/1
75 TABLET, DELAYED RELEASE ORAL 2 TIMES DAILY
COMMUNITY
Start: 2022-06-01 | End: 2022-07-01

## 2022-06-16 NOTE — PROGRESS NOTES
Chief Complaint   Patient presents with   [facility] Wellness Visit     Reviewed record in preparation for visit and have obtained necessary documentation. Identified pt with two pt identifiers(name and ). Health Maintenance Due   Topic    Shingrix Vaccine Age 49> (2 of 2)    Pneumococcal 65+ years (2 - PCV)    Medicare Yearly Exam          Chief Complaint   Patient presents with   [employer] Annual Wellness Visit        Wt Readings from Last 3 Encounters:   22 139 lb 3.2 oz (63.1 kg)   22 141 lb (64 kg)   06/15/21 138 lb 9.6 oz (62.9 kg)     Temp Readings from Last 3 Encounters:   22 97.5 °F (36.4 °C) (Temporal)   22 98.2 °F (36.8 °C) (Temporal)   06/15/21 97.5 °F (36.4 °C) (Temporal)     BP Readings from Last 3 Encounters:   22 130/70   22 110/60   06/15/21 120/68     Pulse Readings from Last 3 Encounters:   22 70   22 82   06/15/21 (!) 59           Learning Assessment:  :     Learning Assessment 2014   PRIMARY LEARNER Patient   HIGHEST LEVEL OF EDUCATION - PRIMARY LEARNER  > 4 YEARS OF COLLEGE   BARRIERS PRIMARY LEARNER NONE   CO-LEARNER CAREGIVER No   PRIMARY LANGUAGE ENGLISH   LEARNER PREFERENCE PRIMARY DEMONSTRATION   ANSWERED BY patient   RELATIONSHIP SELF       Depression Screening:  :     3 most recent PHQ Screens 2022   Little interest or pleasure in doing things Not at all   Feeling down, depressed, irritable, or hopeless Not at all   Total Score PHQ 2 0       Fall Risk Assessment:  :     Fall Risk Assessment, last 12 mths 2022   Able to walk? Yes   Fall in past 12 months? 0   Do you feel unsteady? 0   Are you worried about falling -       Abuse Screening:  :     Abuse Screening Questionnaire 2022   Do you ever feel afraid of your partner? N N   Are you in a relationship with someone who physically or mentally threatens you? N N   Is it safe for you to go home?  Y Y       Coordination of Care Questionnaire:  :     1) Have you been to an emergency room, urgent care clinic since your last visit? no   Hospitalized since your last visit? no             2) Have you seen or consulted any other health care providers outside of [address] [place] [address] since your last visit? no  (Include any pap smears or colon screenings in this section.)    3) Do you have an Advance Directive on file? no    4) Are you interested in receiving information on Advance Directives? NO      Patient is accompanied by self I have received verbal consent from Abdoulaye Maynard to discuss any/all medical information while they are present in the room. Reviewed record  In preparation for visit and have obtained necessary documentation.

## 2022-06-16 NOTE — PROGRESS NOTES
This is the Subsequent Medicare Annual Wellness Exam, performed 12 months or more after the Initial AWV or the last Subsequent AWV    I have reviewed the patient's medical history in detail and updated the computerized patient record. Eye pressures are normal.  Sees optho every 3-6 months. He has had normal dentist appointments. He has ongoing BPH symptoms. Follow up with urology     Assessment/Plan   Education and counseling provided:  Are appropriate based on today's review and evaluation    1. Medicare annual wellness visit, subsequent  -     CBC WITH AUTOMATED DIFF; Future  -     LIPID PANEL; Future  2. Screening for alcoholism  -     SC ANNUAL ALCOHOL SCREEN 15 MIN  3. Prediabetes  -     HEMOGLOBIN A1C WITH EAG; Future  4. Benign prostatic hyperplasia with nocturia  -     METABOLIC PANEL, COMPREHENSIVE; Future       Depression Risk Factor Screening     3 most recent PHQ Screens 6/16/2022   Little interest or pleasure in doing things Not at all   Feeling down, depressed, irritable, or hopeless Not at all   Total Score PHQ 2 0       Alcohol & Drug Abuse Risk Screen    Do you average more than 1 drink per night or more than 7 drinks a week: No    In the past three months have you have had more than 4 drinks containing alcohol on one occasion: No          Functional Ability and Level of Safety    Hearing: Hearing is good. Activities of Daily Living: The home contains: no safety equipment. Patient does total self care      Ambulation: with no difficulty     Fall Risk:  Fall Risk Assessment, last 12 mths 6/16/2022   Able to walk? Yes   Fall in past 12 months? 0   Do you feel unsteady?  0   Are you worried about falling -      Abuse Screen:  Patient is not abused       Cognitive Screening    Has your family/caregiver stated any concerns about your memory: no         Health Maintenance Due     Health Maintenance Due   Topic Date Due    Shingrix Vaccine Age 49> (2 of 2) 01/14/2020    Pneumococcal 65+ years (2 - PCV) 11/08/2020       Patient Care Team   Patient Care Team:  Li Hull MD as PCP - General (Internal Medicine Physician)  Li Hull MD as PCP - [address] [place] [address] Provider  Li Hull MD (Internal Medicine Physician)  Terry Joy MD (Inactive) (Gastroenterology)  Liseth Redd MD (Dermatology Physician)    History     Patient Active Problem List   Diagnosis Code    BPH (benign prostatic hyperplasia) N40.0     Past Medical History:   Diagnosis Date    Snoring       Past Surgical History:   Procedure Laterality Date    HX COLONOSCOPY  8-    normal - return in 10 years    HX VASECTOMY  1983     Current Outpatient Medications   Medication Sig Dispense Refill    diclofenac EC (VOLTAREN) 75 mg EC tablet Take 75 mg by mouth two (2) times a day.  OTHER Dry eye omega  Capsules one a day      latanoprost (XALATAN) 0.005 % ophthalmic solution       metroNIDAZOLE (METROCREAM) 0.75 % topical cream Apply  to affected area daily as needed. Use a thin layer to affected areas after washing       doxycycline (ADOXA) 50 mg tablet Take 50 mg by mouth every seven (7) days. During rosacea flares      aspirin delayed-release 81 mg tablet Take 81 mg by mouth daily.  brimonidine tartrate/timolol (COMBIGAN OP) Apply  to eye. Left eye twice a day (Patient not taking: Reported on 4/20/2022)      ibuprofen (AdviL) 200 mg tablet Take 200 mg by mouth every six (6) hours as needed for Pain. As needed (Patient not taking: Reported on 6/16/2022)      ketotifen (Zaditor) 0.025 % (0.035 %) ophthalmic solution Administer 1 Drop to both eyes two (2) times a day. As needed (Patient not taking: Reported on 6/16/2022)      vit C/E/Zn//lut/mikaela/bio/saf (RETAINE VISION PO) Take  by mouth. As needed (Patient not taking: Reported on 4/20/2022)      multivitamin (ONE A DAY) tablet Take 0.5 Tabs by mouth daily.  (Patient not taking: Reported on 6/15/2021)      psyllium (METAMUCIL) packet Take 1 Packet by mouth every other day.  (Patient not taking: Reported on 4/20/2022)       Allergies   Allergen Reactions    Other Food Other (comments)     Black pepper causes skin problems    Alcohol Other (comments)     Rosacea    Venom-Honey Bee Hives       Family History   Problem Relation Age of Onset    Diabetes Father     Heart Disease Father     Hypertension Father     Diabetes Sister     Hypertension Sister      Social History     Tobacco Use    Smoking status: Never Smoker    Smokeless tobacco: Never Used   Substance Use Topics    Alcohol use: No         Galen Sanchez MD

## 2022-06-16 NOTE — PATIENT INSTRUCTIONS
01/27/22 2245   Vent Information   Vent Type 840   Vent Mode AC/VC   Vt Ordered 340 mL   Rate Set 38 bmp   Peak Flow 70 L/min   Pressure Support 0 cmH20   FiO2  100 %  (decreased to 95)   SpO2 94 %   SpO2/FiO2 ratio 94   Sensitivity 3   PEEP/CPAP 16   Humidification Source Heated wire   Humidification Temp 37   Humidification Temp Measured 37   Circuit Condensation Drained   Vent Patient Data   Peak Inspiratory Pressure 33 cmH2O   Mean Airway Pressure 22 cmH20   Rate Measured 38 br/min   Vt Exhaled 361 mL   Minute Volume 13.8 Liters   I:E Ratio 1:2.00   Plateau Pressure 31 [id number]   Cough/Sputum   Sputum How Obtained Endotracheal;Suctioned   Cough Productive   Sputum Amount Moderate   Sputum Color Dark red   Tenacity Thick;Mucous plugs   Spontaneous Breathing Trial (SBT) RT Doc   Pulse 99   Breath Sounds   Right Upper Lobe Diminished   Right Middle Lobe Diminished   Right Lower Lobe Diminished   Left Upper Lobe Diminished   Left Lower Lobe Diminished   Additional Respiratory  Assessments   Resp (!) 38   Position Prone   Alarm Settings   High Pressure Alarm 50 cmH2O   Low Minute Volume Alarm 4 L/min   Apnea (secs) 20 secs   High Respiratory Rate 45 br/min   Low Exhaled Vt  200 mL   ETT (adult)   Placement Date/Time: 01/14/22 1503   Timeout: Patient;Procedure; Appropriate Equipment;Correct Position  Preoxygenation: Yes  Mask Ventilation: Ventilated by mask (1)  Technique: Rapid sequence  Type: Cuffed  Tube Size: 8 mm  Laryngoscope: GlideScope  . ..    Secured at 24 cm   Measured From Lips   ET Placement Right   Secured By Commercial tube oliveros   Site Condition Dry Medicare Wellness Visit, Male    The best way to live healthy is to have a lifestyle where you eat a well-balanced diet, exercise regularly, limit alcohol use, and quit all forms of tobacco/nicotine, if applicable. Regular preventive services are another way to keep healthy. Preventive services (vaccines, screening tests, monitoring & exams) can help personalize your care plan, which helps you manage your own care. Screening tests can find health problems at the earliest stages, when they are easiest to treat. [facility] follows the current, evidence-based guidelines published by the [facility] ([facility]) when recommending preventive services for our patients. Because we follow these guidelines, sometimes recommendations change over time as research supports it. (For example, a prostate screening blood test is no longer routinely recommended for men with no symptoms). Of course, you and your doctor may decide to screen more often for some diseases, based on your risk and co-morbidities (chronic disease you are already diagnosed with). Preventive services for you include:  - Medicare offers their members a free annual wellness visit, which is time for you and your primary care provider to discuss and plan for your preventive service needs. Take advantage of this benefit every year!  -All adults over age 72 should receive the recommended pneumonia vaccines. Current USPSTF guidelines recommend a series of two vaccines for the best pneumonia protection.   -All adults should have a flu vaccine yearly and tetanus vaccine every 10 years.  -All adults age 48 and older should receive the shingles vaccines (series of two vaccines).        -All adults age 38-68 who are overweight should have a diabetes screening test once every three years.   -Other screening tests & preventive services for persons with diabetes include: an eye exam to screen for diabetic retinopathy, a kidney function test, a foot exam, and stricter control over your cholesterol.   -Cardiovascular screening for adults with routine risk involves an electrocardiogram (ECG) at intervals determined by the provider.   -Colorectal cancer screening should be done for adults age 54-65 with no increased risk factors for colorectal cancer. There are a number of acceptable methods of screening for this type of cancer. Each test has its own benefits and drawbacks. Discuss with your provider what is most appropriate for you during your annual wellness visit. The different tests include: colonoscopy (considered the best screening method), a fecal occult blood test, a fecal DNA test, and sigmoidoscopy.  -All adults born between [facility] should be screened once for Hepatitis C.  -An Abdominal Aortic Aneurysm (AAA) Screening is recommended for men age 73-68 who has ever smoked in their lifetime.      Here is a list of your current Health Maintenance items (your personalized list of preventive services) with a due date:  Health Maintenance Due   Topic Date Due    Shingles Vaccine (2 of 2) 01/14/2020    Pneumococcal Vaccine (2 - PCV) 11/08/2020

## 2022-09-02 ENCOUNTER — OFFICE VISIT (OUTPATIENT)
Dept: INTERNAL MEDICINE CLINIC | Age: 82
End: 2022-09-02
Payer: MEDICARE

## 2022-09-02 VITALS
BODY MASS INDEX: 24.8 KG/M2 | OXYGEN SATURATION: 97 % | RESPIRATION RATE: 16 BRPM | DIASTOLIC BLOOD PRESSURE: 67 MMHG | SYSTOLIC BLOOD PRESSURE: 122 MMHG | HEART RATE: 64 BPM | WEIGHT: 140 LBS | HEIGHT: 63 IN | TEMPERATURE: 97.8 F

## 2022-09-02 DIAGNOSIS — Z02.89 ENCOUNTER FOR COMPLETION OF FORM WITH PATIENT: Primary | ICD-10-CM

## 2022-09-02 PROCEDURE — 99213 OFFICE O/P EST LOW 20 MIN: CPT | Performed by: INTERNAL MEDICINE

## 2022-09-02 PROCEDURE — G8417 CALC BMI ABV UP PARAM F/U: HCPCS | Performed by: INTERNAL MEDICINE

## 2022-09-02 PROCEDURE — G8510 SCR DEP NEG, NO PLAN REQD: HCPCS | Performed by: INTERNAL MEDICINE

## 2022-09-02 PROCEDURE — 1101F PT FALLS ASSESS-DOCD LE1/YR: CPT | Performed by: INTERNAL MEDICINE

## 2022-09-02 PROCEDURE — G0463 HOSPITAL OUTPT CLINIC VISIT: HCPCS | Performed by: INTERNAL MEDICINE

## 2022-09-02 PROCEDURE — G8536 NO DOC ELDER MAL SCRN: HCPCS | Performed by: INTERNAL MEDICINE

## 2022-09-02 PROCEDURE — G8427 DOCREV CUR MEDS BY ELIG CLIN: HCPCS | Performed by: INTERNAL MEDICINE

## 2022-09-02 NOTE — PROGRESS NOTES
Verified name and birth date for privacy precautions. Chart reviewed in preparation for today's visit. Chief Complaint   Patient presents with    Other     Dmv paperwork           Health Maintenance Due   Topic    Shingrix Vaccine Age 50> (2 of 2)    Pneumococcal 65+ years (2 - PCV)    COVID-19 Vaccine (4 - Booster for Moderna series)    Flu Vaccine (1)         Wt Readings from Last 3 Encounters:   09/02/22 140 lb (63.5 kg)   06/16/22 139 lb 3.2 oz (63.1 kg)   04/20/22 141 lb (64 kg)     Temp Readings from Last 3 Encounters:   09/02/22 97.8 °F (36.6 °C)   06/16/22 97.5 °F (36.4 °C) (Temporal)   04/20/22 98.2 °F (36.8 °C) (Temporal)     BP Readings from Last 3 Encounters:   09/02/22 122/67   06/16/22 130/70   04/20/22 110/60     Pulse Readings from Last 3 Encounters:   09/02/22 64   06/16/22 70   04/20/22 82         Learning Assessment:  :     Learning Assessment 12/17/2014   PRIMARY LEARNER Patient   HIGHEST LEVEL OF EDUCATION - PRIMARY LEARNER  > 4 YEARS OF COLLEGE   BARRIERS PRIMARY LEARNER NONE   CO-LEARNER CAREGIVER No   PRIMARY LANGUAGE ENGLISH   LEARNER PREFERENCE PRIMARY DEMONSTRATION   ANSWERED BY patient   RELATIONSHIP SELF       Depression Screening:  :     3 most recent PHQ Screens 9/2/2022   Little interest or pleasure in doing things Not at all   Feeling down, depressed, irritable, or hopeless Not at all   Total Score PHQ 2 0       Fall Risk Assessment:  :     Fall Risk Assessment, last 12 mths 9/2/2022   Able to walk? Yes   Fall in past 12 months? 0   Do you feel unsteady? 0   Are you worried about falling 0       Abuse Screening:  :     Abuse Screening Questionnaire 9/2/2022 6/16/2022 4/20/2022   Do you ever feel afraid of your partner? N N N   Are you in a relationship with someone who physically or mentally threatens you? N N N   Is it safe for you to go home?  Anna Ashton

## 2022-09-06 NOTE — PROGRESS NOTES
Follow Up Visit    Rachael Reynaga is a 79 y.o. male. he presents for Other (Dmv paperwork )    The patient presents for evaluation. He has DMV paperwork that is needed. He let his license lapse and he is required to fill out a document which states his fitness to drive. He has had his ophthalmologist fill this out given his history of glaucoma. He has no other complaints. Feeling well and with no acute issues. Patient Active Problem List   Diagnosis Code    BPH (benign prostatic hyperplasia) N40.0         Prior to Admission medications    Medication Sig Start Date End Date Taking? Authorizing Provider   hydroxyethylcellulose (ARTIFICIAL TEAR, [id number], OP) Apply  to eye. Yes Provider, Historical   OTHER Dry eye omega  Capsules one a day   Yes Provider, Historical   latanoprost (XALATAN) 0.005 % ophthalmic solution  8/20/19  Yes Provider, Historical   aspirin delayed-release 81 mg tablet Take 81 mg by mouth daily. Yes Provider, Historical   metroNIDAZOLE (METROCREAM) 0.75 % topical cream Apply  to affected area daily as needed. Use a thin layer to affected areas after washing   Patient not taking: Reported on 9/2/2022    Provider, Historical         Health Maintenance   Topic Date Due    Shingrix Vaccine Age 50> (2 of 2) 01/14/2020    Pneumococcal 65+ years (2 - PCV) 11/08/2020    Flu Vaccine (1) 09/01/2022    Medicare Yearly Exam  06/17/2023    Depression Screen  09/02/2023    DTaP/Tdap/Td series (2 - Td or Tdap) 10/21/2025    COVID-19 Vaccine  Completed       Review of Systems   Constitutional: Negative. Respiratory: Negative. Cardiovascular: Negative. Gastrointestinal: Negative. Visit Vitals  /67   Pulse 64   Temp 97.8 °F (36.6 °C)   Resp 16   Ht 5' 2.5\" (1.588 m)   Wt 140 lb (63.5 kg)   SpO2 97%   BMI 25.20 kg/m²       Physical Exam  Constitutional:       Appearance: Normal appearance. Cardiovascular:      Rate and Rhythm: Normal rate and regular rhythm. Pulses: Normal pulses. Heart sounds: Normal heart sounds. Neurological:      Mental Status: He is alert. Psychiatric:         Mood and Affect: Mood normal.       ASSESSMENT/PLAN    Diagnoses and all orders for this visit:    1.  Encounter for completion of form with patient

## 2022-09-19 ENCOUNTER — PATIENT MESSAGE (OUTPATIENT)
Dept: INTERNAL MEDICINE CLINIC | Age: 82
End: 2022-09-19

## 2022-09-19 DIAGNOSIS — M79.672 LEFT FOOT PAIN: Primary | ICD-10-CM

## 2022-09-20 NOTE — TELEPHONE ENCOUNTER
From: Hugh Guillermo  To: Nikolai Patrick MD  Sent: 9/19/2022 3:52 AM EDT  Subject: Pain in foot    I am having occasional shooting pain in left upper foot inner. Please advise. Thanks.

## 2022-09-22 ENCOUNTER — PATIENT MESSAGE (OUTPATIENT)
Dept: INTERNAL MEDICINE CLINIC | Age: 82
End: 2022-09-22

## 2022-09-23 ENCOUNTER — OFFICE VISIT (OUTPATIENT)
Dept: INTERNAL MEDICINE CLINIC | Age: 82
End: 2022-09-23
Payer: MEDICARE

## 2022-09-23 VITALS
RESPIRATION RATE: 16 BRPM | DIASTOLIC BLOOD PRESSURE: 73 MMHG | BODY MASS INDEX: 25.09 KG/M2 | OXYGEN SATURATION: 98 % | HEIGHT: 63 IN | HEART RATE: 77 BPM | WEIGHT: 141.6 LBS | SYSTOLIC BLOOD PRESSURE: 132 MMHG | TEMPERATURE: 98.4 F

## 2022-09-23 DIAGNOSIS — R22.40 SUBCUTANEOUS NODULE OF LOWER EXTREMITY: ICD-10-CM

## 2022-09-23 DIAGNOSIS — I83.91 VARICOSE VEINS OF RIGHT LOWER EXTREMITY, UNSPECIFIED WHETHER COMPLICATED: ICD-10-CM

## 2022-09-23 DIAGNOSIS — M79.661 RIGHT CALF PAIN: Primary | ICD-10-CM

## 2022-09-23 PROCEDURE — G8417 CALC BMI ABV UP PARAM F/U: HCPCS | Performed by: NURSE PRACTITIONER

## 2022-09-23 PROCEDURE — G8427 DOCREV CUR MEDS BY ELIG CLIN: HCPCS | Performed by: NURSE PRACTITIONER

## 2022-09-23 PROCEDURE — G8432 DEP SCR NOT DOC, RNG: HCPCS | Performed by: NURSE PRACTITIONER

## 2022-09-23 PROCEDURE — G0463 HOSPITAL OUTPT CLINIC VISIT: HCPCS | Performed by: NURSE PRACTITIONER

## 2022-09-23 PROCEDURE — 1101F PT FALLS ASSESS-DOCD LE1/YR: CPT | Performed by: NURSE PRACTITIONER

## 2022-09-23 PROCEDURE — G8536 NO DOC ELDER MAL SCRN: HCPCS | Performed by: NURSE PRACTITIONER

## 2022-09-23 PROCEDURE — 99213 OFFICE O/P EST LOW 20 MIN: CPT | Performed by: NURSE PRACTITIONER

## 2022-09-23 NOTE — PROGRESS NOTES
HISTORY OF PRESENT ILLNESS  Adolfo Farris is a 79 y.o. male. Patient reports painful lump of right calf starting this week. His legs have been feeling easily fatigued over the past few weeks. No swelling of lower extremity. No bruising. No redness. Visit Vitals  /73 (BP 1 Location: Left upper arm)   Pulse 77   Temp 98.4 °F (36.9 °C) (Oral)   Resp 16   Ht 5' 2.5\" (1.588 m)   Wt 141 lb 9.6 oz (64.2 kg)   SpO2 98%   BMI 25.49 kg/m²       HPI    Review of Systems   Musculoskeletal:         Calf pain and lump of right calf     Physical Exam  Constitutional:       Appearance: Normal appearance. Musculoskeletal:      Comments: Right calf with soft nodule palpated with deep palpation, not superficial, lump feels linear in nature about 1 in; soft, tender; no erythema, no calf swelling, no bruising, varicose veins noted on calf   Neurological:      General: No focal deficit present. Mental Status: He is alert and oriented to person, place, and time. Psychiatric:         Mood and Affect: Mood normal.         Behavior: Behavior normal.       ASSESSMENT and PLAN    ICD-10-CM ICD-9-CM    1. Right calf pain  M79.661 729.5 US EXT NONVAS RT COMP      DUPLEX LOWER EXT VENOUS RIGHT      2. Varicose veins of right lower extremity, unspecified whether complicated  R18.18 395.9 US EXT NONVAS RT COMP      DUPLEX LOWER EXT VENOUS RIGHT      3.  Subcutaneous nodule of lower extremity  R22.40 782.2 US EXT NONVAS RT COMP      DUPLEX LOWER EXT VENOUS RIGHT      Ultrasound ordered, likely varicose vein  Follow-up will depend on results

## 2022-10-04 ENCOUNTER — HOSPITAL ENCOUNTER (OUTPATIENT)
Dept: ULTRASOUND IMAGING | Age: 82
Discharge: HOME | End: 2022-10-04
Attending: NURSE PRACTITIONER
Payer: MEDICARE

## 2022-10-04 ENCOUNTER — TELEPHONE (OUTPATIENT)
Dept: INTERNAL MEDICINE CLINIC | Age: 82
End: 2022-10-04

## 2022-10-04 DIAGNOSIS — M79.661 RIGHT CALF PAIN: ICD-10-CM

## 2022-10-04 DIAGNOSIS — R22.40 SUBCUTANEOUS NODULE OF LOWER EXTREMITY: ICD-10-CM

## 2022-10-04 DIAGNOSIS — I83.91 VARICOSE VEINS OF RIGHT LOWER EXTREMITY, UNSPECIFIED WHETHER COMPLICATED: ICD-10-CM

## 2022-10-04 PROCEDURE — 93971 EXTREMITY STUDY: CPT

## 2022-10-04 NOTE — TELEPHONE ENCOUNTER
Called patient with the update on the Duplex he had done and informed him that is was not a DVT but just veins. Advised patient that we would get the referral to the Vascular doctor and that they would call him with an appointment . .please advise :)

## 2022-10-04 NOTE — TELEPHONE ENCOUNTER
UPDATE:  George gomez called and updated us on patients doppler. The doppler revealed no DVT and it was confirmed the site in question was a veracious vein.  Please advise for further instruction :)

## 2022-10-05 ENCOUNTER — TELEPHONE (OUTPATIENT)
Dept: INTERNAL MEDICINE CLINIC | Age: 82
End: 2022-10-05

## 2022-10-05 DIAGNOSIS — I83.93 VARICOSE VEINS OF BOTH LOWER EXTREMITIES, UNSPECIFIED WHETHER COMPLICATED: Primary | ICD-10-CM

## 2022-10-06 ENCOUNTER — DOCUMENTATION ONLY (OUTPATIENT)
Dept: INTERNAL MEDICINE CLINIC | Age: 82
End: 2022-10-06

## 2022-10-06 NOTE — PROGRESS NOTES
Faxed referral/ order to [facility]. @[phone] confirmation same day. Will have documents scanned into chart. . please advise Nusrat Gutierrez

## 2023-01-17 ENCOUNTER — OFFICE VISIT (OUTPATIENT)
Dept: INTERNAL MEDICINE CLINIC | Age: 83
End: 2023-01-17
Payer: MEDICARE

## 2023-01-17 VITALS
TEMPERATURE: 97.8 F | HEART RATE: 81 BPM | SYSTOLIC BLOOD PRESSURE: 94 MMHG | BODY MASS INDEX: 25.76 KG/M2 | WEIGHT: 145.4 LBS | DIASTOLIC BLOOD PRESSURE: 63 MMHG | RESPIRATION RATE: 18 BRPM | HEIGHT: 63 IN | OXYGEN SATURATION: 99 %

## 2023-01-17 DIAGNOSIS — K59.00 CONSTIPATION, UNSPECIFIED CONSTIPATION TYPE: ICD-10-CM

## 2023-01-17 DIAGNOSIS — M54.32 SCIATICA OF LEFT SIDE: ICD-10-CM

## 2023-01-17 DIAGNOSIS — M25.841 CYST OF JOINT OF RIGHT HAND: ICD-10-CM

## 2023-01-17 DIAGNOSIS — H93.8X2 AUDIBLE HEARTBEAT IN LEFT EAR: Primary | ICD-10-CM

## 2023-01-17 PROCEDURE — G8536 NO DOC ELDER MAL SCRN: HCPCS | Performed by: NURSE PRACTITIONER

## 2023-01-17 PROCEDURE — G8432 DEP SCR NOT DOC, RNG: HCPCS | Performed by: NURSE PRACTITIONER

## 2023-01-17 PROCEDURE — 99213 OFFICE O/P EST LOW 20 MIN: CPT | Performed by: NURSE PRACTITIONER

## 2023-01-17 PROCEDURE — 1101F PT FALLS ASSESS-DOCD LE1/YR: CPT | Performed by: NURSE PRACTITIONER

## 2023-01-17 PROCEDURE — G0463 HOSPITAL OUTPT CLINIC VISIT: HCPCS | Performed by: NURSE PRACTITIONER

## 2023-01-17 PROCEDURE — G8417 CALC BMI ABV UP PARAM F/U: HCPCS | Performed by: NURSE PRACTITIONER

## 2023-01-17 PROCEDURE — G8427 DOCREV CUR MEDS BY ELIG CLIN: HCPCS | Performed by: NURSE PRACTITIONER

## 2023-01-17 NOTE — PROGRESS NOTES
HISTORY OF PRESENT ILLNESS  Sheri Dubose is a 79 y.o. male. Patient reports low back \"cracking\" starting 3 months ago when getting out of bed. He notes left-sided low back pain radiating to buttocks starting 2 weeks ago. He has history of sciatica. He took 2 days of tylenol with some relief. He took ibuprofen twice yesterday with some relief. He has tried cold compresses. He has been more sedentary since the pain started, which has caused mild non-pitting swelling in both feet right more than left. He typically exercises with jory 4 times weekly and treadmill 3 times per week. He has not exercised the past 3 days due to pain. Patient also notes growth of right hand for the past few weeks. No pain or decreased range of motion. No weakness of right hand. Patient also reports chronic constipation. He stools almost daily, but notes hard stools. He uses a fleets enema if no stool in 48 hours. He has also tried colace x 1 dose this week. No abdominal pain. He also reports hearing his heartbeat in his left ear intermittently for weeks. No dizziness or pain. Visit Vitals  BP 94/63   Pulse 81   Temp 97.8 °F (36.6 °C)   Resp 18   Ht 5' 2.5\" (1.588 m)   Wt 145 lb 6.4 oz (66 kg)   SpO2 99%   BMI 26.17 kg/m²       HPI    Review of Systems   Gastrointestinal:  Positive for constipation. Musculoskeletal:  Positive for back pain. Physical Exam  Constitutional:       Appearance: Normal appearance. HENT:      Head: Normocephalic. Neck:      Vascular: No carotid bruit. Cardiovascular:      Rate and Rhythm: Normal rate and regular rhythm. Pulmonary:      Effort: Pulmonary effort is normal.      Breath sounds: Normal breath sounds. Abdominal:      General: Abdomen is flat. Bowel sounds are normal.      Palpations: Abdomen is soft. Tenderness: There is no abdominal tenderness.    Musculoskeletal:      Comments: Right hand with hard pea-sized nodule in palm proximal to middle finger; no pain with palpation Skin:     Comments: Mild bilateral feet edema, right more than left, non-pitting. No erythema   Neurological:      Mental Status: He is alert. Left SI joint pain with palpation radiating to left buttocks; right hip aligned slightly higher than left    ASSESSMENT and PLAN    ICD-10-CM ICD-9-CM    1. Audible heartbeat in left ear  H93.8X2 388.8 DUPLEX CAROTID BILATERAL      2.  Cyst of joint of right hand  M25.841 719.84 REFERRAL TO ORTHOPEDICS      3. Sciatica of left side  M54.32 724.3 REFERRAL TO PHYSICAL THERAPY      4. Constipation, unspecified constipation type  K59.00 564.00       OTC motrin or aleve for pain x 5-7 days  Increase water intake  PT  Increase fiber and use Colace for constipation; try to avoid fleets  Carotid doppler ordered  Refer to ortho

## 2023-01-17 NOTE — PROGRESS NOTES
Chief Complaint   Patient presents with    Back Pain     Back pain starting 3 months ago, change when rising from bed, cracking noise in the body. Pain score of 8 not continual.    Constipation        Visit Vitals  BP 94/63   Pulse 81   Temp 97.8 °F (36.6 °C)   Resp 18   Ht 5' 2.5\" (1.588 m)   Wt 145 lb 6.4 oz (66 kg)   SpO2 99%   BMI 26.17 kg/m²        1. Have you been to the ER, urgent care clinic since your last visit? Hospitalized since your last visit? No    2. Have you seen or consulted any other health care providers outside of the [address] [place] [address] since your last visit? Include any pap smears or colon screening. No     Health Maintenance Due   Topic Date Due    Shingles Vaccine (2 of 2) 01/14/2020    COVID-19 Vaccine (5 - Booster for Moderna series) 06/23/2022    Flu Vaccine (1) 08/01/2022        3 most recent PHQ Screens 1/17/2023   Little interest or pleasure in doing things Not at all   Feeling down, depressed, irritable, or hopeless Not at all   Total Score PHQ 2 0        Fall Risk Assessment, last 12 mths 1/17/2023   Able to walk? Yes   Fall in past 12 months? 0   Do you feel unsteady?  0   Are you worried about falling 0       Learning Assessment 12/17/2014   PRIMARY LEARNER Patient   HIGHEST LEVEL OF EDUCATION - PRIMARY LEARNER  > 4 YEARS OF COLLEGE   BARRIERS PRIMARY LEARNER NONE   CO-LEARNER CAREGIVER No   PRIMARY LANGUAGE ENGLISH   LEARNER PREFERENCE PRIMARY DEMONSTRATION   ANSWERED BY patient   RELATIONSHIP SELF

## 2023-01-23 ENCOUNTER — HOSPITAL ENCOUNTER (OUTPATIENT)
Dept: PHYSICAL THERAPY | Age: 83
Discharge: HOME | End: 2023-01-23
Payer: MEDICARE

## 2023-01-23 PROCEDURE — 97110 THERAPEUTIC EXERCISES: CPT | Performed by: PHYSICAL THERAPIST

## 2023-01-23 PROCEDURE — 97161 PT EVAL LOW COMPLEX 20 MIN: CPT | Performed by: PHYSICAL THERAPIST

## 2023-01-23 NOTE — PROGRESS NOTES
[facility]  [address], [place], [address]  Phone: [phone]  Fax: [phone]    PT INITIAL EVALUATION NOTE - [facility] 2-15    Patient Name: Lora Parra  Date:2023  : 1940  [x]  Patient  Verified   Payor: [place] MEDICARE / Plan: [place] MEDICARE PART A & B / Product Type: Medicare /    In time:230 P  Out time:3:15 P  Total Treatment Time (min): 45  Total Timed Codes (min): 10  1:1 Treatment Time ([facility] only): 39   Visit #: 1     Treatment Area: Sciatica, left side [M54.32]    SUBJECTIVE    Any medication changes, allergies to medications, adverse drug reactions, diagnosis change, or new procedure performed?: [] No    [x] Yes (see summary sheet for update)    Current symptoms/chief complaint and date of onset/injury:   Pt presents with L hip pain. Symptoms for ~2 mo. Reports waking up with a \"cracking\" sound through his entire body ~2 mo ago, the a few days later pain in L hip. He does jory and walks regularly, however has held from exercise for ~1 week. Unsure what caused symptoms-- although possibly attributes symptoms to using weights while doing jory a few days prior to symptoms. Pain along outside of L leg (radiates from hip down lateral aspect of upper leg), only when walking. Denies LBP. No imaging done at this time. Hx of injections in lower back due to \"sciatica\" in L LE    Aggravated by:  inc'd walking  Eased by: rest    Pain Level (0-10 scale): 7/10 max  0/10 min  2/10 today  Location of symptoms: L hip   PMH: Significant for see chart  Social/Recreation/Work: Retired, worked for [employer] in [employer]. Lives alone. Prior level of function: hx of worsening symptoms for ~2 mo  Patient goal(s): \"eliminate pain\"     Objective:      Posture: Toe out bilat; knees and hips slightly flexed    Gait:   Slow oliver. , short step length bilat     Strength:      R (0-5) L (0-5)   Hip Flexion (L1,2) 4 4   Knee Extension (L3,4) 4 4   Knee Flexion (S1,2) 4 4   Ankle Dorsiflexion (L4) 4 4   Bridge WNL  Supine SLR R and L WNL  R and L SLS >3 sec (+ hip drop R and L)  Tandem stance >5 sec ea dir    Palpation:  mod TTP L glute med/piriformis; min TTP L ITB    Special Tests:  SLR Supine: [] R    [] L    [] +    [x] -    Crossed SLR  [] R    [] L    [] +    [x] -    NANCY neg  Scour neg  L LE LAD neg for pain relief, \"no change, it feels okay right now\"          Outcome Measure: Patient presents with a FOTO score of not yet complete.       10 min Therapeutic Exercise:  [x] See flow sheet : instructed in HEP   Rationale: increase ROM and increase strength to improve the patients ability to perform ADL's, daily chores with dec'd symptoms            With   [x] TE   [] TA   [] neuro   [] other: Patient Education: [x] Review HEP    [] Progressed/Changed HEP based on:   [x] positioning   [] body mechanics   [] transfers   [x] heat/ice application    [x] other: shahida/phys; PT POC; importance of performing HEP in order to maximize benefit of PT; use of ice for pain management; encouraged him to hold from theragun, tennis ball TPR at this time; reviewed/modified current exercise routine; encouraged him to avoid prolonged sitting throughout the day; okay to walk on treadmill for short intervals (5-10 min)    Advised for him to discussed LE/bilat foot swelling with PCP if it persists     Pain Level (0-10 scale) post treatment: not captured    Assessment:   [x] See POC  [] Other:  Plan:   [x] See POC  [] Other  [] Discharge due to:     Kiran Valadez, PT, DPT     1/23/2023     2:34 PM

## 2023-01-23 NOTE — PROGRESS NOTES
Physical Therapy at [facility],   a part of [facility]  [address]  [place], [address]  Phone: [phone]  Fax: [phone]    Plan of Care/Statement of Necessity for Physical Therapy Services  2-15    Patient name: Mariana Johnson  : 1940  Provider#: [id number]  Referral source: Angela Hill NP      Medical/Treatment Diagnosis: Sciatica, left side [M54.32]     Prior Hospitalization: see medical history     Comorbidities: see evaluation  Prior Level of Function: see evaluation  Medications: Verified on Patient Summary List  Start of Care: 23      Onset Date: ~2 mo ago   The Plan of Care and following information is based on the information from the initial evaluation. Assessment/ key information: Pt is an 79 yr old male presenting with acute onset of L hip pain ~2 mo ago.  He will benefit from PT to address problem list below    Evaluation Complexity History MEDIUM  Complexity : 1-2 comorbidities / personal factors will impact the outcome/ POC ; Examination LOW Complexity : 1-2 Standardized tests and measures addressing body structure, function, activity limitation and / or participation in recreation  ;Presentation LOW Complexity : Stable, uncomplicated  ;Clinical Decision Making MEDIUM Complexity : FOTO score of 26-74  Overall Complexity Rating: LOW     Problem List: pain affecting function, decrease ROM, decrease strength, decrease ADL/ functional abilitiies, decrease activity tolerance, and decrease flexibility/ joint mobility   Treatment Plan may include any combination of the following: Therapeutic exercise, Neuromuscular reeducation, Manual therapy, Therapeutic activity, Self care/home management, Electric stim unattended , Vasopneumatic device, Gait training, and Ultrasound  Patient / Family readiness to learn indicated by: asking questions, trying to perform skills, and interest  Persons(s) to be included in education: patient (P)  Barriers to Learning/Limitations: None  Patient Goal (s): see evaluation  Patient Self Reported Health Status: good  Rehabilitation Potential: good    Short Term Goals: To be accomplished in 2-3 weeks:  1) Pt will be independent in initial HEP  2) Pt will report >/=25% improvement in symptoms    Long Term Goals: To be accomplished in 10-12 weeks:  1) Pt will report being able to walk for >/=20 min without symptoms  2) Pt will report return to jory without symptoms  3) Pt will be independent in final HEP    Frequency / Duration: Patient to be seen 1-2 times per week for 10-12 weeks. Patient/ Caregiver education and instruction: self care, activity modification, and exercises    [x]  Plan of care has been reviewed with PTA    Certification Period: 1/23/23-   Irma Rasheed, PT 1/23/2023    ________________________________________________________________________    I certify that the above Therapy Services are being furnished while the patient is under my care. I agree with the treatment plan and certify that this therapy is necessary.     Physician's Signature:____________________  Date:____________Time: _________         Sheryle Roulette, NP

## 2023-01-31 ENCOUNTER — HOSPITAL ENCOUNTER (OUTPATIENT)
Dept: PHYSICAL THERAPY | Age: 83
Discharge: HOME | End: 2023-01-31
Payer: MEDICARE

## 2023-01-31 PROCEDURE — 97110 THERAPEUTIC EXERCISES: CPT | Performed by: PHYSICAL THERAPY ASSISTANT

## 2023-01-31 NOTE — PROGRESS NOTES
PT DAILY TREATMENT NOTE - [facility] 2-15    Patient Name: Doretha Escobar  Date:2023  : 1940  [x]  Patient  Verified  Payor: [place] MEDICARE / Plan: [place] MEDICARE PART A & B / Product Type: Medicare /    In time: 10:30 AM Out time: 11:30 AM  Total Treatment Time (min): 60  Total Timed Codes (min): 50  1:1 Treatment Time (MC only): 40   Visit #:  2    Treatment Area: Sciatica, left side [M54.32]    SUBJECTIVE  Pain Level (0-10 scale): 2/10  Any medication changes, allergies to medications, adverse drug reactions, diagnosis change, or new procedure performed?: [x] No    [] Yes (see summary sheet for update)  Subjective functional status/changes:   [] No changes reported  Patient reports his pain levels are better and he has been working on the exercises at home.     OBJECTIVE    Modality rationale: decrease pain and increase tissue extensibility to improve the patients ability to perform ADL's   Min Type Additional Details       [] Estim: []Att   []Unatt    []TENS instruct                  []IFC  []Premod   []NMES                     []Other:  []w/US   []w/ice   []w/heat  Position:  Location:       []  Traction: [] Cervical       []Lumbar                       [] Prone          []Supine                       []Intermittent   []Continuous Lbs:  [] before manual  [] after manual  []w/heat    []  Ultrasound: []Continuous   [] Pulsed                       at: []1MHz   []3MHz Location:  W/cm2:    [] Paraffin         Location:   []w/heat   10 [x]  Ice     []  Heat  []  Ice massage Position: supine  Location: lumbar spine    []  Laser  []  Other: Position:  Location:      []  Vasopneumatic Device Pressure:       [] lo [] med [] hi   Temperature:      [x] Skin assessment post-treatment:  [x]intact []redness- no adverse reaction    []redness - adverse reaction:     50 min Therapeutic Exercise:  [x] See flow sheet : reviewed HEP, added per flow sheet   Rationale: increase ROM, increase strength, and improve coordination to improve the patients ability to perform aDL's         min Manual Therapy:     Rationale: decrease pain, increase ROM, increase tissue extensibility, and decrease trigger points to improve the patients ability to perform aDL's              With   [x] TE   [] TA   [] Neuro   [] SC   [] other: Patient Education: [x] Review HEP    [] Progressed/Changed HEP based on:   [] positioning   [] body mechanics   [] transfers   [] heat/ice application    [x] other: educated patient on low impact exercise (use of a recumbent bike vs. Treadmill) possibility of the pool for gentle exercise as well. Other Objective/Functional Measures: nt     Pain Level (0-10 scale) post treatment: 0/10    ASSESSMENT/Changes in Function:   Patient tolerated exercises well today. Decreased pain noted at end of session. Patient will continue to benefit from skilled PT services to modify and progress therapeutic interventions, address functional mobility deficits, address ROM deficits, address strength deficits, analyze and address soft tissue restrictions, analyze and cue movement patterns, and instruct in home and community integration to attain remaining goals.      []  See Plan of Care  []  See progress note/recertification  []  See Discharge Summary         Progress towards goals / Updated goals:  nt    PLAN  [x]  Upgrade activities as tolerated     []  Continue plan of care  []  Update interventions per flow sheet       []  Discharge due to:_  []  Other:_      Hedy Peters PTA 1/31/2023

## 2023-02-03 ENCOUNTER — HOSPITAL ENCOUNTER (OUTPATIENT)
Dept: PHYSICAL THERAPY | Age: 83
Discharge: HOME | End: 2023-02-03
Payer: MEDICARE

## 2023-02-03 PROCEDURE — 97110 THERAPEUTIC EXERCISES: CPT | Performed by: PHYSICAL THERAPIST

## 2023-02-03 NOTE — PROGRESS NOTES
PT DAILY TREATMENT NOTE - [facility] 2-15    Patient Name: Rosetta Wills  Date:2/3/2023  : 1940  [x]  Patient  Verified  Payor: [place] MEDICARE / Plan: [place] MEDICARE PART A & B / Product Type: Medicare /    In time: 10:30 AM Out time: 11:15 AM  Total Treatment Time (min): 45  Total Timed Codes (min): 45  1:1 Treatment Time (MC only): 30   Visit #:  3    Treatment Area: Sciatica, left side [M54.32]    SUBJECTIVE  Pain Level (0-10 scale): 0/10  Any medication changes, allergies to medications, adverse drug reactions, diagnosis change, or new procedure performed?: [x] No    [] Yes (see summary sheet for update)  Subjective functional status/changes:   [] No changes reported  Patient reports that he is feeling much better, \"its magic! \" He has not more pain.     OBJECTIVE    Modality rationale: decrease pain and increase tissue extensibility to improve the patients ability to perform ADL's   Min Type Additional Details       [] Estim: []Att   []Unatt    []TENS instruct                  []IFC  []Premod   []NMES                     []Other:  []w/US   []w/ice   []w/heat  Position:  Location:       []  Traction: [] Cervical       []Lumbar                       [] Prone          []Supine                       []Intermittent   []Continuous Lbs:  [] before manual  [] after manual  []w/heat    []  Ultrasound: []Continuous   [] Pulsed                       at: []1MHz   []3MHz Location:  W/cm2:    [] Paraffin         Location:   []w/heat   decl [x]  Ice     []  Heat  []  Ice massage Position: supine  Location: lumbar spine    []  Laser  []  Other: Position:  Location:      []  Vasopneumatic Device Pressure:       [] lo [] med [] hi   Temperature:      [x] Skin assessment post-treatment:  [x]intact []redness- no adverse reaction    []redness - adverse reaction:     45 min Therapeutic Exercise:  [x] See flow sheet : reviewed HEP, added per flow sheet   Rationale: increase ROM, increase strength, and improve coordination to improve the patients ability to perform aDL's          With   [x] TE   [] TA   [] Neuro   [] SC   [] other: Patient Education: [x] Review HEP    [] Progressed/Changed HEP based on:   [] positioning   [] body mechanics   [] transfers   [] heat/ice application    [x] other: educated patient on low impact exercise (use of a recumbent bike vs. Treadmill) possibility of the pool for gentle exercise as well. Other Objective/Functional Measures: nt     Pain Level (0-10 scale) post treatment: 0/10    ASSESSMENT/Changes in Function:   Due to relief of symptoms, pt to trial with HEP independently for the next week and then D/C if continuing to do well. Discussed return to prior activities such as jory and encouraged continuing PT exercises for maintenance. Patient will continue to benefit from skilled PT services to modify and progress therapeutic interventions, address functional mobility deficits, address ROM deficits, address strength deficits, analyze and address soft tissue restrictions, analyze and cue movement patterns, and instruct in home and community integration to attain remaining goals.      []  See Plan of Care  []  See progress note/recertification  []  See Discharge Summary         Progress towards goals / Updated goals:  nt    PLAN  [x]  Upgrade activities as tolerated     []  Continue plan of care  []  Update interventions per flow sheet       []  Discharge due to:_  []  Other:_      Arabella Saleh PT 2/3/2023

## 2023-02-06 ENCOUNTER — OFFICE VISIT (OUTPATIENT)
Dept: INTERNAL MEDICINE CLINIC | Age: 83
End: 2023-02-06
Payer: MEDICARE

## 2023-02-06 VITALS
TEMPERATURE: 98.1 F | HEART RATE: 82 BPM | WEIGHT: 143.2 LBS | RESPIRATION RATE: 16 BRPM | SYSTOLIC BLOOD PRESSURE: 110 MMHG | HEIGHT: 63 IN | OXYGEN SATURATION: 98 % | DIASTOLIC BLOOD PRESSURE: 60 MMHG | BODY MASS INDEX: 25.37 KG/M2

## 2023-02-06 DIAGNOSIS — M79.89 FOOT SWELLING: Primary | ICD-10-CM

## 2023-02-06 PROCEDURE — G8417 CALC BMI ABV UP PARAM F/U: HCPCS | Performed by: INTERNAL MEDICINE

## 2023-02-06 PROCEDURE — G8536 NO DOC ELDER MAL SCRN: HCPCS | Performed by: INTERNAL MEDICINE

## 2023-02-06 PROCEDURE — G8510 SCR DEP NEG, NO PLAN REQD: HCPCS | Performed by: INTERNAL MEDICINE

## 2023-02-06 PROCEDURE — 1101F PT FALLS ASSESS-DOCD LE1/YR: CPT | Performed by: INTERNAL MEDICINE

## 2023-02-06 PROCEDURE — G8427 DOCREV CUR MEDS BY ELIG CLIN: HCPCS | Performed by: INTERNAL MEDICINE

## 2023-02-06 PROCEDURE — 99214 OFFICE O/P EST MOD 30 MIN: CPT | Performed by: INTERNAL MEDICINE

## 2023-02-06 PROCEDURE — G0463 HOSPITAL OUTPT CLINIC VISIT: HCPCS | Performed by: INTERNAL MEDICINE

## 2023-02-06 NOTE — PROGRESS NOTES
Chief Complaint   Patient presents with    Foot Swelling     Cracking of bones, hear beat ringing in ear     Reviewed record in preparation for visit and have obtained necessary documentation. Identified pt with two pt identifiers(name and ). Health Maintenance Due   Topic    Shingles Vaccine (2 of 2)    COVID-19 Vaccine (5 - Booster for Moderna series)    Flu Vaccine (1)         Chief Complaint   Patient presents with    Foot Swelling     Cracking of bones, hear beat ringing in ear        Wt Readings from Last 3 Encounters:   23 143 lb 3.2 oz (65 kg)   23 145 lb 6.4 oz (66 kg)   22 141 lb 9.6 oz (64.2 kg)     Temp Readings from Last 3 Encounters:   23 98.1 °F (36.7 °C) (Temporal)   23 97.8 °F (36.6 °C)   22 98.4 °F (36.9 °C) (Oral)     BP Readings from Last 3 Encounters:   23 110/60   23 94/63   22 132/73     Pulse Readings from Last 3 Encounters:   23 82   23 81   22 77           Learning Assessment:  :     Learning Assessment 2014   PRIMARY LEARNER Patient   HIGHEST LEVEL OF EDUCATION - PRIMARY LEARNER  > 4 YEARS OF COLLEGE   BARRIERS PRIMARY LEARNER NONE   CO-LEARNER CAREGIVER No   PRIMARY LANGUAGE ENGLISH   LEARNER PREFERENCE PRIMARY DEMONSTRATION   ANSWERED BY patient   RELATIONSHIP SELF       Depression Screening:  :     3 most recent PHQ Screens 2023   Little interest or pleasure in doing things Not at all   Feeling down, depressed, irritable, or hopeless Not at all   Total Score PHQ 2 0       Fall Risk Assessment:  :     Fall Risk Assessment, last 12 mths 2023   Able to walk? Yes   Fall in past 12 months? 0   Do you feel unsteady? 0   Are you worried about falling 0       Abuse Screening:  :     Abuse Screening Questionnaire 2023   Do you ever feel afraid of your partner? N N N N   Are you in a relationship with someone who physically or mentally threatens you?  N N N N   Is it safe for you to go home? Y Y Y Y       Coordination of Care Questionnaire:  :     1) Have you been to an emergency room, urgent care clinic since your last visit? no   Hospitalized since your last visit? no             2) Have you seen or consulted any other health care providers outside of [address] [place] [address] since your last visit? no  (Include any pap smears or colon screenings in this section.)    3) Do you have an Advance Directive on file? no    4) Are you interested in receiving information on Advance Directives? NO      Patient is accompanied by self I have received verbal consent from Tayla Schwartz to discuss any/all medical information while they are present in the room. Reviewed record  In preparation for visit and have obtained necessary documentation.

## 2023-02-06 NOTE — PROGRESS NOTES
Acute Care Note    Khai Rogers is 79 y.o. male. he presents for evaluation of Foot Swelling (Cracking of bones, hear beat ringing in ear)      He presents with several questions about his health status. He can hear his joint cracking at times. He was hearing his heart beat in his ear somewhat in the past weeks. This has resolved. He also has had some bilateral foot swelling which has since resolved. He notes that this lasted a few days. He has no other symptoms. He follows up with optho. Glaucoma is stable. Prior to Admission medications    Medication Sig Start Date End Date Taking? Authorizing Provider   docusate sodium (STOOL SOFTENER PO) Take  by mouth. Yes Provider, Historical   MULTIVITAMIN PO Take  by mouth. Yes Provider, Historical   hydroxyethylcellulose (ARTIFICIAL TEAR, FNRAGR-C-WYM, OP) Apply  to eye. Yes Provider, Historical   latanoprost (XALATAN) 0.005 % ophthalmic solution  8/20/19  Yes Provider, Historical   aspirin delayed-release 81 mg tablet Take 81 mg by mouth daily. Yes Provider, Historical   OTHER Dry eye omega  Capsules one a day    Provider, Historical         Patient Active Problem List   Diagnosis Code    BPH (benign prostatic hyperplasia) N40.0         Review of Systems   Constitutional: Negative. Respiratory: Negative. Cardiovascular: Negative. Visit Vitals  /60   Pulse 82   Temp 98.1 °F (36.7 °C) (Temporal)   Resp 16   Ht 5' 2.5\" (1.588 m)   Wt 143 lb 3.2 oz (65 kg)   SpO2 98%   BMI 25.77 kg/m²       Physical Exam  Constitutional:       Appearance: He is well-developed. Cardiovascular:      Rate and Rhythm: Normal rate and regular rhythm. Pulmonary:      Effort: Pulmonary effort is normal.      Breath sounds: Normal breath sounds. ASSESSMENT/PLAN  Diagnoses and all orders for this visit:    1. Foot swelling - He notes that this has been stable and resolved at this point. He will monitor for worsening.   We discussed the need to avoid sodium in his diet. Advised the patient to call back or return to office if symptoms worsen/change/persist.   Discussed expected course/resolution/complications of diagnosis in detail with patient. Medication risks/benefits/costs/interactions/alternatives discussed with patient. The patient was given an after visit summary which includes diagnoses, current medications, & vitals. They expressed understanding with the diagnosis and plan.

## 2023-02-07 ENCOUNTER — HOSPITAL ENCOUNTER (OUTPATIENT)
Dept: PHYSICAL THERAPY | Age: 83
Discharge: HOME | End: 2023-02-07
Payer: MEDICARE

## 2023-02-07 PROCEDURE — 97110 THERAPEUTIC EXERCISES: CPT | Performed by: PHYSICAL THERAPIST

## 2023-02-07 NOTE — PROGRESS NOTES
PT DAILY TREATMENT NOTE - [facility] 2-15    Patient Name: Marli Wilson  Date:2023  : 1940  [x]  Patient  Verified  Payor: [place] MEDICARE / Plan: [place] MEDICARE PART A & B / Product Type: Medicare /    In time: 7:10 AM Out time: 7:25 AM  Total Treatment Time (min): 15  Total Timed Codes (min): 15  1:1 Treatment Time (MC only): 15   Visit #:  4    Treatment Area: Sciatica, left side [M54.32]    SUBJECTIVE  Pain Level (0-10 scale): 0/10  Any medication changes, allergies to medications, adverse drug reactions, diagnosis change, or new procedure performed?: [x] No    [] Yes (see summary sheet for update)  Subjective functional status/changes:   [] No changes reported  Patient reports that he had \"severe\" pain in bilat calf region the night after last PT visit- he attributes to seated HS stretch so wants to know if he is doing the exercise correctly.    He joined the [employer] rationale: decrease pain and increase tissue extensibility to improve the patients ability to perform ADL's   Min Type Additional Details       [] Estim: []Att   []Unatt    []TENS instruct                  []IFC  []Premod   []NMES                     []Other:  []w/US   []w/ice   []w/heat  Position:  Location:       []  Traction: [] Cervical       []Lumbar                       [] Prone          []Supine                       []Intermittent   []Continuous Lbs:  [] before manual  [] after manual  []w/heat    []  Ultrasound: []Continuous   [] Pulsed                       at: []1MHz   []3MHz Location:  W/cm2:    [] Paraffin         Location:   []w/heat   decl [x]  Ice     []  Heat  []  Ice massage Position: supine  Location: lumbar spine    []  Laser  []  Other: Position:  Location:      []  Vasopneumatic Device Pressure:       [] lo [] med [] hi   Temperature:      [x] Skin assessment post-treatment:  [x]intact []redness- no adverse reaction    []redness - adverse reaction:     15 min Therapeutic Exercise:  [x] See flow sheet :    Rationale: increase ROM, increase strength, and improve coordination to improve the patients ability to perform aDL's          With   [x] TE   [] TA   [] Neuro   [] SC   [] other: Patient Education: [x] Review HEP    [] Progressed/Changed HEP based on:   [] positioning   [] body mechanics   [] transfers   [] heat/ice application    [x] other: educated patient on low impact exercise (use of a recumbent bike vs. Treadmill) possibility of the pool for gentle exercise as well. Other Objective/Functional Measures: nt     Pain Level (0-10 scale) post treatment: 0/10    ASSESSMENT/Changes in Function:   Reviewed proper form with seated HS stretching; also advised on standing gastroc stretch-- pt given updated HEP handout. Discussed exercise routine at the gym and return to jory class. Plan for him to trial with HEP independently for the next 2-3 weeks  Patient will continue to benefit from skilled PT services to modify and progress therapeutic interventions, address functional mobility deficits, address ROM deficits, address strength deficits, analyze and address soft tissue restrictions, analyze and cue movement patterns, and instruct in home and community integration to attain remaining goals.      []  See Plan of Care  []  See progress note/recertification  []  See Discharge Summary         Progress towards goals / Updated goals:  nt    PLAN  [x]  Upgrade activities as tolerated     []  Continue plan of care  []  Update interventions per flow sheet       []  Discharge due to:_  []  Other:_      Estela Isbell, PT 2/7/2023

## 2023-02-28 ENCOUNTER — HOSPITAL ENCOUNTER (OUTPATIENT)
Dept: PHYSICAL THERAPY | Age: 83
Discharge: HOME | End: 2023-02-28
Payer: MEDICARE

## 2023-02-28 PROCEDURE — 97110 THERAPEUTIC EXERCISES: CPT | Performed by: PHYSICAL THERAPIST

## 2023-02-28 NOTE — PROGRESS NOTES
PT DAILY TREATMENT/discharge NOTE - [facility] 2-15    Patient Name: Patti John  Date:2023  : 1940  [x]  Patient  Verified  Payor: [place] MEDICARE / Plan: VA MEDICARE PART A & B / Product Type: Medicare /    In time: 8:00 AM Out time: 8:08 AM  Total Treatment Time (min): 8  Total Timed Codes (min): 8  1:1 Treatment Time [facility] only):8   Visit #:  5    Treatment Area: Sciatica, left side [M54.32]    SUBJECTIVE  Pain Level (0-10 scale): 0/10  Any medication changes, allergies to medications, adverse drug reactions, diagnosis change, or new procedure performed?: [x] No    [] Yes (see summary sheet for update)  Subjective functional status/changes:   [] No changes reported  Patient states that he has returned to jory, he feels great.  He is wondering how often he should keep up with the exercises    OBJECTIVE    Modality rationale: decrease pain and increase tissue extensibility to improve the patients ability to perform ADL's   Min Type Additional Details       [] Estim: []Att   []Unatt    []TENS instruct                  []IFC  []Premod   []NMES                     []Other:  []w/US   []w/ice   []w/heat  Position:  Location:       []  Traction: [] Cervical       []Lumbar                       [] Prone          []Supine                       []Intermittent   []Continuous Lbs:  [] before manual  [] after manual  []w/heat    []  Ultrasound: []Continuous   [] Pulsed                       at: []1MHz   []3MHz Location:  W/cm2:    [] Paraffin         Location:   []w/heat   decl [x]  Ice     []  Heat  []  Ice massage Position: supine  Location: lumbar spine    []  Laser  []  Other: Position:  Location:      []  Vasopneumatic Device Pressure:       [] lo [] med [] hi   Temperature:      [x] Skin assessment post-treatment:  [x]intact []redness- no adverse reaction    []redness - adverse reaction:     8 min Therapeutic Exercise:  [x] See flow sheet :    Rationale: increase ROM, increase strength, and improve coordination to improve the patients ability to perform aDL's          With   [x] TE   [] TA   [] Neuro   [] SC   [] other: Patient Education: [x] Review HEP    [] Progressed/Changed HEP based on:   [] positioning   [] body mechanics   [] transfers   [] heat/ice application    [x] other: educated patient on low impact exercise (use of a recumbent bike vs. Treadmill) possibility of the pool for gentle exercise as well. Other Objective/Functional Measures: nt     Pain Level (0-10 scale) post treatment: 0/10    ASSESSMENT/Changes in Function:   Discussed maintenance program, performing HEP ~3-4x/wk since he is feeling better/no symptoms. Reviewed proper form with piriformis stretch today. Pt ready for D/C from PT at this time. [x]  See Discharge Summary         Progress towards goals / Updated goals:  Short Term Goals: To be accomplished in 2-3 weeks:  1) Pt will be independent in initial HEP MET  2) Pt will report >/=25% improvement in symptoms MET     Long Term Goals: To be accomplished in 10-12 weeks:  1) Pt will report being able to walk for >/=20 min without symptoms MET  2) Pt will report return to jory without symptoms MET  3) Pt will be independent in final HEP MET    PLAN   [x]  Discharge due to: met 100% of goals, independent in HEP; no symptoms.        Felisa Espinal, PT 2/28/2023

## 2023-04-05 NOTE — PROGRESS NOTES
Acute Care Note    Carlton Velasquez is 65 y.o. male. he presents for evaluation of Hip Pain    Hip Pain  Patient complains of right hip and gluteal pain. Onset of the symptoms was a week ago. Inciting event: none. Current symptoms include right sided buttock pain. Severity = moderate. Associated symptoms: none. Aggravating symptoms: rising after sitting. Patient's overall course: symptoms have progressed to a point and plateaued. . Patient has had no prior hip problems. Previous visits for this problem: none. Evaluation to date: none. Treatment to date: OTC analgesics PRN: not very effective. Prior to Admission medications    Medication Sig Start Date End Date Taking? Authorizing Provider   acetaminophen (TYLENOL EXTRA STRENGTH) 500 mg tablet Take  by mouth every six (6) hours as needed for Pain. Yes Historical Provider   metroNIDAZOLE (METROCREAM) 0.75 % topical cream Apply  to affected area daily as needed. Use a thin layer to affected areas after washing    Yes Historical Provider   doxycycline (ADOXA) 50 mg tablet Take 50 mg by mouth every seven (7) days. During rosacea flares   Yes Historical Provider   aspirin delayed-release 81 mg tablet Take 81 mg by mouth daily. Yes Historical Provider   psyllium (METAMUCIL) packet Take 1 Packet by mouth every other day. Yes Historical Provider   multivitamin (ONE A DAY) tablet Take 0.5 Tabs by mouth daily. Historical Provider         Patient Active Problem List   Diagnosis Code    BPH (benign prostatic hyperplasia) N40.0         Review of Systems   Constitutional: Negative. Respiratory: Negative. Cardiovascular: Negative. Gastrointestinal: Negative. Visit Vitals    /58 (BP 1 Location: Right arm, BP Patient Position: Sitting)    Pulse 67    Temp 97.7 °F (36.5 °C) (Oral)    Resp 17    Ht 5' 3\" (1.6 m)    Wt 142 lb (64.4 kg)    SpO2 97%    BMI 25.15 kg/m2       Physical Exam   Constitutional: No distress.    Cardiovascular: Normal rate and regular rhythm. Musculoskeletal: He exhibits tenderness (noted at the right gluteal along with spasm). ASSESSMENT/PLAN  Diagnoses and all orders for this visit:    1. Gluteal pain  -     cyclobenzaprine (FLEXERIL) 5 mg tablet; Take 1 Tab by mouth three (3) times daily as needed for Muscle Spasm(s). Advised the patient to call back or return to office if symptoms worsen/change/persist.   Discussed expected course/resolution/complications of diagnosis in detail with patient. Medication risks/benefits/costs/interactions/alternatives discussed with patient. The patient was given an after visit summary which includes diagnoses, current medications, & vitals. They expressed understanding with the diagnosis and plan. no

## 2023-09-12 ENCOUNTER — OFFICE VISIT (OUTPATIENT)
Age: 83
End: 2023-09-12
Payer: MEDICARE

## 2023-09-12 VITALS
TEMPERATURE: 98.3 F | OXYGEN SATURATION: 96 % | HEART RATE: 61 BPM | RESPIRATION RATE: 16 BRPM | WEIGHT: 141 LBS | BODY MASS INDEX: 24.98 KG/M2 | SYSTOLIC BLOOD PRESSURE: 123 MMHG | DIASTOLIC BLOOD PRESSURE: 75 MMHG | HEIGHT: 63 IN

## 2023-09-12 DIAGNOSIS — N40.0 BENIGN PROSTATIC HYPERPLASIA, UNSPECIFIED WHETHER LOWER URINARY TRACT SYMPTOMS PRESENT: ICD-10-CM

## 2023-09-12 DIAGNOSIS — R73.03 PREDIABETES: ICD-10-CM

## 2023-09-12 DIAGNOSIS — K59.00 CONSTIPATION, UNSPECIFIED CONSTIPATION TYPE: ICD-10-CM

## 2023-09-12 DIAGNOSIS — Z00.00 MEDICARE ANNUAL WELLNESS VISIT, SUBSEQUENT: Primary | ICD-10-CM

## 2023-09-12 DIAGNOSIS — R41.3 MEMORY DEFICIT: ICD-10-CM

## 2023-09-12 PROCEDURE — 4004F PT TOBACCO SCREEN RCVD TLK: CPT | Performed by: INTERNAL MEDICINE

## 2023-09-12 PROCEDURE — G0439 PPPS, SUBSEQ VISIT: HCPCS | Performed by: INTERNAL MEDICINE

## 2023-09-12 PROCEDURE — G8419 CALC BMI OUT NRM PARAM NOF/U: HCPCS | Performed by: INTERNAL MEDICINE

## 2023-09-12 PROCEDURE — G8427 DOCREV CUR MEDS BY ELIG CLIN: HCPCS | Performed by: INTERNAL MEDICINE

## 2023-09-12 PROCEDURE — 1123F ACP DISCUSS/DSCN MKR DOCD: CPT | Performed by: INTERNAL MEDICINE

## 2023-09-12 PROCEDURE — 99213 OFFICE O/P EST LOW 20 MIN: CPT | Performed by: INTERNAL MEDICINE

## 2023-09-12 RX ORDER — CLINDAMYCIN PHOSPHATE 10 UG/ML
LOTION TOPICAL 2 TIMES DAILY
COMMUNITY

## 2023-09-12 RX ORDER — CLOBETASOL PROPIONATE 0.5 MG/G
CREAM TOPICAL 2 TIMES DAILY
COMMUNITY

## 2023-09-12 RX ORDER — VITAMIN E 268 MG
400 CAPSULE ORAL DAILY
COMMUNITY

## 2023-09-12 SDOH — ECONOMIC STABILITY: HOUSING INSECURITY
IN THE LAST 12 MONTHS, WAS THERE A TIME WHEN YOU DID NOT HAVE A STEADY PLACE TO SLEEP OR SLEPT IN A SHELTER (INCLUDING NOW)?: NO

## 2023-09-12 SDOH — ECONOMIC STABILITY: FOOD INSECURITY: WITHIN THE PAST 12 MONTHS, YOU WORRIED THAT YOUR FOOD WOULD RUN OUT BEFORE YOU GOT MONEY TO BUY MORE.: NEVER TRUE

## 2023-09-12 SDOH — ECONOMIC STABILITY: FOOD INSECURITY: WITHIN THE PAST 12 MONTHS, THE FOOD YOU BOUGHT JUST DIDN'T LAST AND YOU DIDN'T HAVE MONEY TO GET MORE.: NEVER TRUE

## 2023-09-12 SDOH — ECONOMIC STABILITY: INCOME INSECURITY: HOW HARD IS IT FOR YOU TO PAY FOR THE VERY BASICS LIKE FOOD, HOUSING, MEDICAL CARE, AND HEATING?: NOT HARD AT ALL

## 2023-09-12 ASSESSMENT — PATIENT HEALTH QUESTIONNAIRE - PHQ9
SUM OF ALL RESPONSES TO PHQ QUESTIONS 1-9: 0
SUM OF ALL RESPONSES TO PHQ QUESTIONS 1-9: 0
SUM OF ALL RESPONSES TO PHQ9 QUESTIONS 1 & 2: 0
1. LITTLE INTEREST OR PLEASURE IN DOING THINGS: 0
SUM OF ALL RESPONSES TO PHQ QUESTIONS 1-9: 0
SUM OF ALL RESPONSES TO PHQ QUESTIONS 1-9: 0
2. FEELING DOWN, DEPRESSED OR HOPELESS: 0

## 2023-09-12 ASSESSMENT — LIFESTYLE VARIABLES
HOW OFTEN DO YOU HAVE A DRINK CONTAINING ALCOHOL: NEVER
HOW MANY STANDARD DRINKS CONTAINING ALCOHOL DO YOU HAVE ON A TYPICAL DAY: PATIENT DOES NOT DRINK

## 2023-09-12 NOTE — PATIENT INSTRUCTIONS
your medical record and screening and assessments performed today your provider may have ordered immunizations, labs, imaging, and/or referrals for you. A list of these orders (if applicable) as well as your Preventive Care list are included within your After Visit Summary for your review. Other Preventive Recommendations:    A preventive eye exam performed by an eye specialist is recommended every 1-2 years to screen for glaucoma; cataracts, macular degeneration, and other eye disorders. A preventive dental visit is recommended every 6 months. Try to get at least 150 minutes of exercise per week or 10,000 steps per day on a pedometer . Order or download the FREE \"Exercise & Physical Activity: Your Everyday Guide\" from [employer]. Call 1-952.334.8088 or search [employer] online. You need 7939-1409 mg of calcium and 9172-2340 IU of vitamin D per day. It is possible to meet your calcium requirement with diet alone, but a vitamin D supplement is usually necessary to meet this goal.  When exposed to the sun, use a sunscreen that protects against both UVA and UVB radiation with an SPF of 30 or greater. Reapply every 2 to 3 hours or after sweating, drying off with a towel, or swimming. Always wear a seat belt when traveling in a car. Always wear a helmet when riding a bicycle or motorcycle.

## 2023-09-12 NOTE — PROGRESS NOTES
Medicare Annual Wellness Visit    [address] [facility] is here for Medicare AWV    Assessment & Plan   Medicare annual wellness visit, subsequent  -     Comprehensive Metabolic Panel; Future  -     CBC with Auto Differential; Future  -     Lipid Panel; Future  Benign prostatic hyperplasia, unspecified whether lower urinary tract symptoms present  -     UT OFFICE OUTPATIENT VISIT 15 MINUTES [36615]  Constipation, unspecified constipation type  Prediabetes  -     Hemoglobin A1C; Future  Memory deficit  -     Lynn Gaspar, PhD, Neuropsychology, [place]  Recommendations for Preventive Services Due: see orders and patient instructions/AVS.  Recommended screening schedule for the next 5-10 years is provided to the patient in written form: see Patient Instructions/AVS.     Return in 6 months (on 3/12/2024). Subjective   We discussed his recent issues with constipation. He has not been drinking much water. He has had some issues with memory. We discussed that he would benefit from an evaluation with neuropsych. A referral will be placed today. Patient's complete Health Risk Assessment and screening values have been reviewed and are found in Flowsheets. The following problems were reviewed today and where indicated follow up appointments were made and/or referrals ordered. Positive Risk Factor Screenings with Interventions:       Cognitive:    Words recalled: 1 Word Recalled   Clock Drawing Test (CDT): (!) Abnormal   Total Score: (!) 1   Total Score Interpretation: Abnormal Mini-Cog      Interventions:  Referred to Neuropsychology           General HRA Questions:  Select all that apply: (!) Loneliness, Stress    Loneliness Interventions:  He will work on becoming more engaged with his family    Stress Interventions:  See AVS for additional education material           Safety:  Do you have any tripping hazards - loose or unsecured carpets or rugs?: (!) Yes  Interventions:  Patient declined any

## 2023-09-13 DIAGNOSIS — Z00.00 MEDICARE ANNUAL WELLNESS VISIT, SUBSEQUENT: ICD-10-CM

## 2023-09-13 DIAGNOSIS — R73.03 PREDIABETES: ICD-10-CM

## 2023-09-14 LAB
ALBUMIN SERPL-MCNC: 4.1 G/DL (ref 3.5–5)
ALBUMIN/GLOB SERPL: 1.3 (ref 1.1–2.2)
ALP SERPL-CCNC: 59 U/L (ref 45–117)
ALT SERPL-CCNC: 29 U/L (ref 12–78)
ANION GAP SERPL CALC-SCNC: 4 MMOL/L (ref 5–15)
AST SERPL-CCNC: 22 U/L (ref 15–37)
BASOPHILS # BLD: 0 K/UL (ref 0–0.1)
BASOPHILS NFR BLD: 0 % (ref 0–1)
BILIRUB SERPL-MCNC: 0.9 MG/DL (ref 0.2–1)
BUN SERPL-MCNC: 8 MG/DL (ref 6–20)
BUN/CREAT SERPL: 7 (ref 12–20)
CALCIUM SERPL-MCNC: 9.3 MG/DL (ref 8.5–10.1)
CHLORIDE SERPL-SCNC: 100 MMOL/L (ref 97–108)
CHOLEST SERPL-MCNC: 174 MG/DL
CO2 SERPL-SCNC: 29 MMOL/L (ref 21–32)
CREAT SERPL-MCNC: 1.12 MG/DL (ref 0.7–1.3)
DIFFERENTIAL METHOD BLD: NORMAL
EOSINOPHIL # BLD: 0.1 K/UL (ref 0–0.4)
EOSINOPHIL NFR BLD: 2 % (ref 0–7)
ERYTHROCYTE [DISTWIDTH] IN BLOOD BY AUTOMATED COUNT: 11.9 % (ref 11.5–14.5)
EST. AVERAGE GLUCOSE BLD GHB EST-MCNC: 114 MG/DL
GLOBULIN SER CALC-MCNC: 3.2 G/DL (ref 2–4)
GLUCOSE SERPL-MCNC: 99 MG/DL (ref 65–100)
HBA1C MFR BLD: 5.6 % (ref 4–5.6)
HCT VFR BLD AUTO: 44.1 % (ref 36.6–50.3)
HDLC SERPL-MCNC: 39 MG/DL
HDLC SERPL: 4.5 (ref 0–5)
HGB BLD-MCNC: 14.6 G/DL (ref 12.1–17)
IMM GRANULOCYTES # BLD AUTO: 0 K/UL (ref 0–0.04)
IMM GRANULOCYTES NFR BLD AUTO: 0 % (ref 0–0.5)
LDLC SERPL CALC-MCNC: 101.8 MG/DL (ref 0–100)
LYMPHOCYTES # BLD: 1.7 K/UL (ref 0.8–3.5)
LYMPHOCYTES NFR BLD: 30 % (ref 12–49)
MCH RBC QN AUTO: 31 PG (ref 26–34)
MCHC RBC AUTO-ENTMCNC: 33.1 G/DL (ref 30–36.5)
MCV RBC AUTO: 93.6 FL (ref 80–99)
MONOCYTES # BLD: 0.6 K/UL (ref 0–1)
MONOCYTES NFR BLD: 10 % (ref 5–13)
NEUTS SEG # BLD: 3.3 K/UL (ref 1.8–8)
NEUTS SEG NFR BLD: 58 % (ref 32–75)
NRBC # BLD: 0 K/UL (ref 0–0.01)
NRBC BLD-RTO: 0 PER 100 WBC
PLATELET # BLD AUTO: 168 K/UL (ref 150–400)
PMV BLD AUTO: 10.3 FL (ref 8.9–12.9)
POTASSIUM SERPL-SCNC: 4.4 MMOL/L (ref 3.5–5.1)
PROT SERPL-MCNC: 7.3 G/DL (ref 6.4–8.2)
RBC # BLD AUTO: 4.71 M/UL (ref 4.1–5.7)
SODIUM SERPL-SCNC: 133 MMOL/L (ref 136–145)
TRIGL SERPL-MCNC: 166 MG/DL
VLDLC SERPL CALC-MCNC: 33.2 MG/DL
WBC # BLD AUTO: 5.7 K/UL (ref 4.1–11.1)

## 2024-01-24 ENCOUNTER — TELEPHONE (OUTPATIENT)
Age: 84
End: 2024-01-24

## 2024-01-24 NOTE — TELEPHONE ENCOUNTER
Patient would like a call back to schedule with Dr. Marion. He has been referred due to memory deficit.

## 2024-01-31 ENCOUNTER — OFFICE VISIT (OUTPATIENT)
Dept: PRIMARY CARE CLINIC | Facility: CLINIC | Age: 84
End: 2024-01-31
Payer: MEDICARE

## 2024-01-31 VITALS
HEART RATE: 66 BPM | DIASTOLIC BLOOD PRESSURE: 74 MMHG | TEMPERATURE: 97.8 F | SYSTOLIC BLOOD PRESSURE: 158 MMHG | RESPIRATION RATE: 18 BRPM | HEIGHT: 63 IN | BODY MASS INDEX: 24.41 KG/M2 | WEIGHT: 137.8 LBS | OXYGEN SATURATION: 98 %

## 2024-01-31 DIAGNOSIS — K64.4 EXTERNAL HEMORRHOID: ICD-10-CM

## 2024-01-31 DIAGNOSIS — K59.00 CONSTIPATION, UNSPECIFIED CONSTIPATION TYPE: ICD-10-CM

## 2024-01-31 DIAGNOSIS — R73.03 PREDIABETES: ICD-10-CM

## 2024-01-31 DIAGNOSIS — R25.1 TREMORS OF NERVOUS SYSTEM: ICD-10-CM

## 2024-01-31 DIAGNOSIS — N40.0 BENIGN PROSTATIC HYPERPLASIA, UNSPECIFIED WHETHER LOWER URINARY TRACT SYMPTOMS PRESENT: Primary | ICD-10-CM

## 2024-01-31 PROCEDURE — G8484 FLU IMMUNIZE NO ADMIN: HCPCS | Performed by: INTERNAL MEDICINE

## 2024-01-31 PROCEDURE — 1036F TOBACCO NON-USER: CPT | Performed by: INTERNAL MEDICINE

## 2024-01-31 PROCEDURE — 99204 OFFICE O/P NEW MOD 45 MIN: CPT | Performed by: INTERNAL MEDICINE

## 2024-01-31 PROCEDURE — 1123F ACP DISCUSS/DSCN MKR DOCD: CPT | Performed by: INTERNAL MEDICINE

## 2024-01-31 PROCEDURE — G8427 DOCREV CUR MEDS BY ELIG CLIN: HCPCS | Performed by: INTERNAL MEDICINE

## 2024-01-31 PROCEDURE — G8420 CALC BMI NORM PARAMETERS: HCPCS | Performed by: INTERNAL MEDICINE

## 2024-01-31 RX ORDER — POLYETHYLENE GLYCOL 3350 17 G/17G
17 POWDER, FOR SOLUTION ORAL DAILY
Qty: 1530 G | Refills: 1 | Status: SHIPPED | OUTPATIENT
Start: 2024-01-31 | End: 2024-07-29

## 2024-01-31 ASSESSMENT — PATIENT HEALTH QUESTIONNAIRE - PHQ9
SUM OF ALL RESPONSES TO PHQ QUESTIONS 1-9: 0
SUM OF ALL RESPONSES TO PHQ9 QUESTIONS 1 & 2: 0
SUM OF ALL RESPONSES TO PHQ QUESTIONS 1-9: 0
1. LITTLE INTEREST OR PLEASURE IN DOING THINGS: 0
SUM OF ALL RESPONSES TO PHQ QUESTIONS 1-9: 0
2. FEELING DOWN, DEPRESSED OR HOPELESS: 0
SUM OF ALL RESPONSES TO PHQ QUESTIONS 1-9: 0

## 2024-01-31 NOTE — PROGRESS NOTES
Matthew Puente is a 83 y.o.  male and presents with     Chief Complaint   Patient presents with    Established New Doctor     Patient has urinary frequency, but he does drink 6 glasses of water a day. Takes [place] herbs if constipated.   DOMINIC   Has sharp headache pain on and off but if he takes aspirin it will go away.  Unusual groin pain  Referred to psychiatric counseling by Dr. Cortez     Pt is here to establish care, used to see Dr Cortez  Pt was seeing Urology 4-5 years back , PSA was up.  Pt has medicare wellness last year and was felt ot have memory loss    Pt has 3 children , 10 grandkids  2 great kids.  Wife passed away 4 years ago.  Complication of lung and heart  Pt has frequent urination  Patient reports that he has a lesion in the rectal area and wants to see a dermatologist      Past Medical History:   Diagnosis Date    Glaucoma     Snoring      Past Surgical History:   Procedure Laterality Date    COLONOSCOPY  8-    normal - return in 10 years    VASECTOMY  1983     Current Outpatient Medications   Medication Sig    polyethylene glycol (GLYCOLAX) 17 GM/SCOOP powder Take 17 g by mouth daily    vitamin E 400 UNIT capsule Take 1 capsule by mouth daily    psyllium (METAMUCIL) 58.6 % packet Take 1 packet by mouth every 7 days    clobetasol (TEMOVATE) 0.05 % cream Apply topically 2 times daily Apply topically 2 times daily.    clindamycin (CLEOCIN T) 1 % lotion Apply topically 2 times daily as needed Apply topically 2 times daily.    ammonium lactate (LAC-HYDRIN) 5 % LOTN lotion Apply topically as needed    Multiple Vitamin (MULTIVITAMIN PO) Take by mouth    aspirin 81 MG EC tablet Take 1 tablet by mouth daily    latanoprost (XALATAN) 0.005 % ophthalmic solution ceived the following from [facility]: Outside name: latanoprost (XALATAN) 0.005 % ophthalmic solution     No current facility-administered medications for this visit.     Health Maintenance   Topic Date Due

## 2024-02-21 ENCOUNTER — OFFICE VISIT (OUTPATIENT)
Dept: PRIMARY CARE CLINIC | Facility: CLINIC | Age: 84
End: 2024-02-21
Payer: MEDICARE

## 2024-02-21 VITALS
OXYGEN SATURATION: 98 % | BODY MASS INDEX: 24.45 KG/M2 | SYSTOLIC BLOOD PRESSURE: 158 MMHG | TEMPERATURE: 97.8 F | WEIGHT: 138 LBS | HEIGHT: 63 IN | RESPIRATION RATE: 18 BRPM | HEART RATE: 66 BPM | DIASTOLIC BLOOD PRESSURE: 84 MMHG

## 2024-02-21 DIAGNOSIS — R06.7 SNEEZING: ICD-10-CM

## 2024-02-21 DIAGNOSIS — R05.1 ACUTE COUGH: Primary | ICD-10-CM

## 2024-02-21 LAB
GROUP A STREP ANTIGEN, POC: NEGATIVE
INFLUENZA A ANTIGEN, POC: NEGATIVE
INFLUENZA B ANTIGEN, POC: NEGATIVE
LOT EXPIRE DATE: NORMAL
LOT KIT NUMBER: NORMAL
SARS-COV-2 RNA, POC: NEGATIVE
VALID INTERNAL CONTROL, POC: YES
VALID INTERNAL CONTROL: YES
VENDOR AND KIT NAME POC: NORMAL

## 2024-02-21 PROCEDURE — G8427 DOCREV CUR MEDS BY ELIG CLIN: HCPCS | Performed by: INTERNAL MEDICINE

## 2024-02-21 PROCEDURE — 1036F TOBACCO NON-USER: CPT | Performed by: INTERNAL MEDICINE

## 2024-02-21 PROCEDURE — G8420 CALC BMI NORM PARAMETERS: HCPCS | Performed by: INTERNAL MEDICINE

## 2024-02-21 PROCEDURE — 87428 SARSCOV & INF VIR A&B AG IA: CPT | Performed by: INTERNAL MEDICINE

## 2024-02-21 PROCEDURE — 1123F ACP DISCUSS/DSCN MKR DOCD: CPT | Performed by: INTERNAL MEDICINE

## 2024-02-21 PROCEDURE — 87880 STREP A ASSAY W/OPTIC: CPT | Performed by: INTERNAL MEDICINE

## 2024-02-21 PROCEDURE — 99213 OFFICE O/P EST LOW 20 MIN: CPT | Performed by: INTERNAL MEDICINE

## 2024-02-21 PROCEDURE — G8484 FLU IMMUNIZE NO ADMIN: HCPCS | Performed by: INTERNAL MEDICINE

## 2024-02-21 NOTE — PROGRESS NOTES
\"Have you been to the ER, urgent care clinic since your last visit?  Hospitalized since your last visit?\"    NO    “Have you seen or consulted any other health care providers outside of [facility] since your last visit?”    NO       
vaccine  Aged Out    Hepatitis B vaccine  Aged Out    Hib vaccine  Aged Out    Polio vaccine  Aged Out    Meningococcal (ACWY) vaccine  Aged Out     Immunization History   Administered Date(s) Administered    COVID-19, MODERNA BLUE border, Primary or Immunocompromised, (age 12y+), IM, 100 mcg/0.5mL 01/29/2021, 03/01/2021    Influenza, High Dose (Fluzone 65 yrs and older) 10/15/2014, 10/21/2017, 09/24/2019    Influenza, Triv, inactivated, subunit, adjuvanted, IM (Fluad 65 yrs and older) 09/26/2019    Pneumococcal, PPSV23, PNEUMOVAX 23, (age 2y+), SC/IM, 0.5mL 11/08/2019    TDaP, ADACEL (age 10y-64y), BOOSTRIX (age 10y+), IM, 0.5mL 01/01/2014    Td vaccine (adult) 01/01/2014    Zoster Recombinant (Shingrix) 11/19/2019     No LMP for male patient.        Allergies and Intolerances:   Allergies   Allergen Reactions    Alcohol Other (See Comments)     Rosacea    Bee Venom Hives    Other Other (See Comments)     Black pepper causes skin problems       Family History:   Family History   Problem Relation Age of Onset    Diabetes Sister     Hypertension Sister     Hypertension Father     Heart Disease Father     Diabetes Father        Social History:   He  reports that he has never smoked. He has never used smokeless tobacco.  He  reports no history of alcohol use.            Review of Systems:   General: negative for - chills, fatigue, fever, weight change  Psych: negative for - anxiety, depression, irritability or mood swings  ENT: negative for - headaches, hearing change, nasal congestion, oral lesions, sneezing or sore throat  Heme/ Lymph: negative for - bleeding problems, bruising, pallor or swollen lymph nodes  Endo: negative for - hot flashes, polydipsia/polyuria or temperature intolerance  Resp: negative for - cough, shortness of breath or wheezing  CV: negative for - chest pain, edema or palpitations  GI: negative for - abdominal pain, change in bowel habits, constipation, diarrhea or nausea/vomiting  : negative

## 2024-05-16 DIAGNOSIS — K59.00 CONSTIPATION, UNSPECIFIED CONSTIPATION TYPE: Primary | ICD-10-CM

## 2024-05-16 RX ORDER — LACTULOSE 10 G/15ML
30 SOLUTION ORAL EVERY EVENING
Qty: 946 ML | Refills: 1 | Status: SHIPPED | OUTPATIENT
Start: 2024-05-16

## 2024-06-24 ENCOUNTER — OFFICE VISIT (OUTPATIENT)
Dept: PRIMARY CARE CLINIC | Facility: CLINIC | Age: 84
End: 2024-06-24
Payer: MEDICARE

## 2024-06-24 VITALS
OXYGEN SATURATION: 99 % | HEART RATE: 66 BPM | DIASTOLIC BLOOD PRESSURE: 77 MMHG | BODY MASS INDEX: 24.77 KG/M2 | HEIGHT: 63 IN | WEIGHT: 139.8 LBS | SYSTOLIC BLOOD PRESSURE: 157 MMHG | TEMPERATURE: 98.2 F | RESPIRATION RATE: 18 BRPM

## 2024-06-24 DIAGNOSIS — H40.9 GLAUCOMA OF BOTH EYES, UNSPECIFIED GLAUCOMA TYPE: Primary | ICD-10-CM

## 2024-06-24 PROCEDURE — 1036F TOBACCO NON-USER: CPT | Performed by: INTERNAL MEDICINE

## 2024-06-24 PROCEDURE — 1123F ACP DISCUSS/DSCN MKR DOCD: CPT | Performed by: INTERNAL MEDICINE

## 2024-06-24 PROCEDURE — 99213 OFFICE O/P EST LOW 20 MIN: CPT | Performed by: INTERNAL MEDICINE

## 2024-06-24 PROCEDURE — G8419 CALC BMI OUT NRM PARAM NOF/U: HCPCS | Performed by: INTERNAL MEDICINE

## 2024-06-24 PROCEDURE — G8427 DOCREV CUR MEDS BY ELIG CLIN: HCPCS | Performed by: INTERNAL MEDICINE

## 2024-06-24 RX ORDER — AMOXICILLIN 500 MG/1
500 CAPSULE ORAL 2 TIMES DAILY
COMMUNITY
Start: 2024-06-18

## 2024-06-24 ASSESSMENT — PATIENT HEALTH QUESTIONNAIRE - PHQ9
SUM OF ALL RESPONSES TO PHQ9 QUESTIONS 1 & 2: 0
SUM OF ALL RESPONSES TO PHQ QUESTIONS 1-9: 0
SUM OF ALL RESPONSES TO PHQ QUESTIONS 1-9: 0
1. LITTLE INTEREST OR PLEASURE IN DOING THINGS: NOT AT ALL
2. FEELING DOWN, DEPRESSED OR HOPELESS: NOT AT ALL
SUM OF ALL RESPONSES TO PHQ QUESTIONS 1-9: 0
SUM OF ALL RESPONSES TO PHQ QUESTIONS 1-9: 0

## 2024-06-24 NOTE — PROGRESS NOTES
\"Have you been to the ER, urgent care clinic since your last visit?  Hospitalized since your last visit?\"    NO    “Have you seen or consulted any other health care providers outside of [facility] since your last visit?”    NO          Click Here for Release of Records Request

## 2024-06-24 NOTE — PROGRESS NOTES
Matthew Puente is a 83 y.o. male and presents with     Chief Complaint   Patient presents with    Forms     DMV forms       History of Present Illness  The patient presents for paperwork completion.Pt failed vision test for DMV    Two years prior, the patient underwent a series of diagnostic tests, which yielded normal results. He was advised to consult a specialist and provided a form for completion. He completed the form in 2023 and subsequently received a letter stipulating a reminder for him to continue the same exercise regimen after a year. Currently, he is seeking a form to be completed by his ophthalmologist. He has an upcoming appointment on 07/22/2024 at [facility], where they will complete the vision report. He denies any history of accidents or hazards at traffic light. His medical history is devoid of stroke or cardiac issues. His last visit was in 01/2024. He reports no difficulty in breathing and maintains an exercise routine daily. He has a mild form of glaucoma in his right eye and was unable to pass his vision test. He does not drive a commercial vehicle.   He was a profession . He has 3 children.         Past Medical History:   Diagnosis Date    Glaucoma     Snoring      Past Surgical History:   Procedure Laterality Date    COLONOSCOPY  8-    normal - return in 10 years    VASECTOMY  1983     Current Outpatient Medications   Medication Sig    amoxicillin (AMOXIL) 500 MG capsule Take 1 capsule by mouth 2 times daily    lactulose (CHRONULAC) 10 GM/15ML solution Take 45 mLs by mouth every evening    vitamin E 400 UNIT capsule Take 1 capsule by mouth daily    clobetasol (TEMOVATE) 0.05 % cream Apply topically 2 times daily Apply topically 2 times daily.    clindamycin (CLEOCIN T) 1 % lotion Apply topically 2 times daily as needed Apply topically 2 times daily.    ammonium lactate (LAC-HYDRIN) 5 % LOTN lotion Apply topically as needed    Multiple Vitamin (MULTIVITAMIN PO)

## 2024-07-13 ENCOUNTER — APPOINTMENT (OUTPATIENT)
Facility: HOSPITAL | Age: 84
End: 2024-07-13
Payer: MEDICARE

## 2024-07-13 ENCOUNTER — APPOINTMENT (OUTPATIENT)
Facility: HOSPITAL | Age: 84
End: 2024-07-13
Attending: STUDENT IN AN ORGANIZED HEALTH CARE EDUCATION/TRAINING PROGRAM
Payer: MEDICARE

## 2024-07-13 ENCOUNTER — HOSPITAL ENCOUNTER (INPATIENT)
Facility: HOSPITAL | Age: 84
LOS: 6 days | Discharge: NURSING, REHAB OR LONG-TERM CARE | End: 2024-07-19
Attending: STUDENT IN AN ORGANIZED HEALTH CARE EDUCATION/TRAINING PROGRAM | Admitting: HOSPITALIST
Payer: MEDICARE

## 2024-07-13 DIAGNOSIS — R07.9 CHEST PAIN: ICD-10-CM

## 2024-07-13 DIAGNOSIS — I21.4 NSTEMI (NON-ST ELEVATED MYOCARDIAL INFARCTION) (HCC): Primary | ICD-10-CM

## 2024-07-13 LAB
ACT BLD: 226 SECS (ref 79–138)
ALBUMIN SERPL-MCNC: 4 G/DL (ref 3.5–5)
ALBUMIN/GLOB SERPL: 1.1 (ref 1.1–2.2)
ALP SERPL-CCNC: 56 U/L (ref 45–117)
ALT SERPL-CCNC: 21 U/L (ref 12–78)
ANION GAP SERPL CALC-SCNC: 1 MMOL/L (ref 5–15)
APPEARANCE UR: CLEAR
APTT PPP: 27.3 SEC (ref 22.1–31)
AST SERPL-CCNC: 21 U/L (ref 15–37)
BACTERIA URNS QL MICRO: NEGATIVE /HPF
BASOPHILS # BLD: 0 K/UL (ref 0–0.1)
BASOPHILS NFR BLD: 1 % (ref 0–1)
BILIRUB SERPL-MCNC: 0.7 MG/DL (ref 0.2–1)
BILIRUB UR QL: NEGATIVE
BUN SERPL-MCNC: 11 MG/DL (ref 6–20)
BUN/CREAT SERPL: 9 (ref 12–20)
CALCIUM SERPL-MCNC: 9.6 MG/DL (ref 8.5–10.1)
CHLORIDE SERPL-SCNC: 105 MMOL/L (ref 97–108)
CHOLEST SERPL-MCNC: 177 MG/DL
CO2 SERPL-SCNC: 29 MMOL/L (ref 21–32)
COLOR UR: NORMAL
CREAT SERPL-MCNC: 1.24 MG/DL (ref 0.7–1.3)
DIFFERENTIAL METHOD BLD: NORMAL
EOSINOPHIL # BLD: 0.1 K/UL (ref 0–0.4)
EOSINOPHIL NFR BLD: 2 % (ref 0–7)
EPITH CASTS URNS QL MICRO: NORMAL /LPF
ERYTHROCYTE [DISTWIDTH] IN BLOOD BY AUTOMATED COUNT: 11.8 % (ref 11.5–14.5)
EST. AVERAGE GLUCOSE BLD GHB EST-MCNC: 111 MG/DL
GLOBULIN SER CALC-MCNC: 3.7 G/DL (ref 2–4)
GLUCOSE BLD STRIP.AUTO-MCNC: 106 MG/DL (ref 65–117)
GLUCOSE SERPL-MCNC: 102 MG/DL (ref 65–100)
GLUCOSE UR STRIP.AUTO-MCNC: NEGATIVE MG/DL
HBA1C MFR BLD: 5.5 % (ref 4–5.6)
HCT VFR BLD AUTO: 46.2 % (ref 36.6–50.3)
HDLC SERPL-MCNC: 39 MG/DL
HDLC SERPL: 4.5 (ref 0–5)
HGB BLD-MCNC: 15.4 G/DL (ref 12.1–17)
HGB UR QL STRIP: NEGATIVE
HYALINE CASTS URNS QL MICRO: NORMAL /LPF (ref 0–5)
IMM GRANULOCYTES # BLD AUTO: 0 K/UL (ref 0–0.04)
IMM GRANULOCYTES NFR BLD AUTO: 0 % (ref 0–0.5)
INR PPP: 1.1 (ref 0.9–1.1)
KETONES UR QL STRIP.AUTO: NEGATIVE MG/DL
LDLC SERPL CALC-MCNC: 105 MG/DL (ref 0–100)
LEUKOCYTE ESTERASE UR QL STRIP.AUTO: NEGATIVE
LIPASE SERPL-CCNC: 40 U/L (ref 13–75)
LYMPHOCYTES # BLD: 1.6 K/UL (ref 0.8–3.5)
LYMPHOCYTES NFR BLD: 28 % (ref 12–49)
MCH RBC QN AUTO: 31.2 PG (ref 26–34)
MCHC RBC AUTO-ENTMCNC: 33.3 G/DL (ref 30–36.5)
MCV RBC AUTO: 93.7 FL (ref 80–99)
MONOCYTES # BLD: 0.6 K/UL (ref 0–1)
MONOCYTES NFR BLD: 11 % (ref 5–13)
NEUTS SEG # BLD: 3.4 K/UL (ref 1.8–8)
NEUTS SEG NFR BLD: 58 % (ref 32–75)
NITRITE UR QL STRIP.AUTO: NEGATIVE
NRBC # BLD: 0 K/UL (ref 0–0.01)
NRBC BLD-RTO: 0 PER 100 WBC
NT PRO BNP: 156 PG/ML
PH UR STRIP: 7.5 (ref 5–8)
PLATELET # BLD AUTO: 159 K/UL (ref 150–400)
PMV BLD AUTO: 10.9 FL (ref 8.9–12.9)
POTASSIUM SERPL-SCNC: 4.1 MMOL/L (ref 3.5–5.1)
PROT SERPL-MCNC: 7.7 G/DL (ref 6.4–8.2)
PROT UR STRIP-MCNC: NEGATIVE MG/DL
PROTHROMBIN TIME: 11.4 SEC (ref 9–11.1)
RBC # BLD AUTO: 4.93 M/UL (ref 4.1–5.7)
RBC #/AREA URNS HPF: NORMAL /HPF (ref 0–5)
SERVICE CMNT-IMP: NORMAL
SODIUM SERPL-SCNC: 135 MMOL/L (ref 136–145)
SP GR UR REFRACTOMETRY: 1.02 (ref 1–1.03)
THERAPEUTIC RANGE: NORMAL SECS (ref 58–77)
TRIGL SERPL-MCNC: 165 MG/DL
TROPONIN I SERPL HS-MCNC: 998 NG/L (ref 0–76)
TROPONIN I SERPL HS-MCNC: ABNORMAL NG/L (ref 0–76)
TSH SERPL DL<=0.05 MIU/L-ACNC: 3.78 UIU/ML (ref 0.36–3.74)
UFH PPP CHRO-ACNC: <0.1 IU/ML
URINE CULTURE IF INDICATED: NORMAL
UROBILINOGEN UR QL STRIP.AUTO: 0.2 EU/DL (ref 0.2–1)
VAS LEFT CCA DIST EDV: 3.8 CM/S
VAS LEFT CCA DIST PSV: 57.8 CM/S
VAS LEFT CCA PROX EDV: 8.1 CM/S
VAS LEFT CCA PROX PSV: 91.9 CM/S
VAS LEFT ECA EDV: 0.8 CM/S
VAS LEFT ECA PSV: 52.1 CM/S
VAS LEFT ICA DIST EDV: 8.2 CM/S
VAS LEFT ICA DIST PSV: 51 CM/S
VAS LEFT ICA MID EDV: 7.4 CM/S
VAS LEFT ICA MID PSV: 39.6 CM/S
VAS LEFT ICA PROX EDV: 10.2 CM/S
VAS LEFT ICA PROX PSV: 58.6 CM/S
VAS LEFT ICA/CCA PSV: 1 NO UNITS
VAS LEFT VERTEBRAL EDV: 11.6 CM/S
VAS LEFT VERTEBRAL PSV: 54.1 CM/S
VAS RIGHT CCA DIST EDV: 11.6 CM/S
VAS RIGHT CCA DIST PSV: 61.7 CM/S
VAS RIGHT CCA PROX EDV: 5.6 CM/S
VAS RIGHT CCA PROX PSV: 100.8 CM/S
VAS RIGHT ECA EDV: 0 CM/S
VAS RIGHT ECA PSV: 59.8 CM/S
VAS RIGHT ICA DIST EDV: 11.5 CM/S
VAS RIGHT ICA DIST PSV: 49.6 CM/S
VAS RIGHT ICA MID EDV: 13 CM/S
VAS RIGHT ICA MID PSV: 61.8 CM/S
VAS RIGHT ICA PROX EDV: 10.7 CM/S
VAS RIGHT ICA PROX PSV: 61.7 CM/S
VAS RIGHT ICA/CCA PSV: 1 NO UNITS
VAS RIGHT VERTEBRAL EDV: 3.1 CM/S
VAS RIGHT VERTEBRAL PSV: 36.5 CM/S
VLDLC SERPL CALC-MCNC: 33 MG/DL
WBC # BLD AUTO: 5.7 K/UL (ref 4.1–11.1)
WBC URNS QL MICRO: NORMAL /HPF (ref 0–4)

## 2024-07-13 PROCEDURE — 6360000004 HC RX CONTRAST MEDICATION: Performed by: INTERNAL MEDICINE

## 2024-07-13 PROCEDURE — 6360000002 HC RX W HCPCS: Performed by: STUDENT IN AN ORGANIZED HEALTH CARE EDUCATION/TRAINING PROGRAM

## 2024-07-13 PROCEDURE — 93571 IV DOP VEL&/PRESS C FLO 1ST: CPT | Performed by: INTERNAL MEDICINE

## 2024-07-13 PROCEDURE — 85520 HEPARIN ASSAY: CPT

## 2024-07-13 PROCEDURE — 93880 EXTRACRANIAL BILAT STUDY: CPT

## 2024-07-13 PROCEDURE — 96374 THER/PROPH/DIAG INJ IV PUSH: CPT

## 2024-07-13 PROCEDURE — 99291 CRITICAL CARE FIRST HOUR: CPT

## 2024-07-13 PROCEDURE — 6370000000 HC RX 637 (ALT 250 FOR IP): Performed by: STUDENT IN AN ORGANIZED HEALTH CARE EDUCATION/TRAINING PROGRAM

## 2024-07-13 PROCEDURE — 4A023N7 MEASUREMENT OF CARDIAC SAMPLING AND PRESSURE, LEFT HEART, PERCUTANEOUS APPROACH: ICD-10-PCS | Performed by: INTERNAL MEDICINE

## 2024-07-13 PROCEDURE — 85610 PROTHROMBIN TIME: CPT

## 2024-07-13 PROCEDURE — C1894 INTRO/SHEATH, NON-LASER: HCPCS | Performed by: INTERNAL MEDICINE

## 2024-07-13 PROCEDURE — 80053 COMPREHEN METABOLIC PANEL: CPT

## 2024-07-13 PROCEDURE — 85730 THROMBOPLASTIN TIME PARTIAL: CPT

## 2024-07-13 PROCEDURE — 83036 HEMOGLOBIN GLYCOSYLATED A1C: CPT

## 2024-07-13 PROCEDURE — 2500000003 HC RX 250 WO HCPCS: Performed by: INTERNAL MEDICINE

## 2024-07-13 PROCEDURE — 36415 COLL VENOUS BLD VENIPUNCTURE: CPT

## 2024-07-13 PROCEDURE — 2580000003 HC RX 258: Performed by: NURSE PRACTITIONER

## 2024-07-13 PROCEDURE — 83690 ASSAY OF LIPASE: CPT

## 2024-07-13 PROCEDURE — 93970 EXTREMITY STUDY: CPT

## 2024-07-13 PROCEDURE — 99153 MOD SED SAME PHYS/QHP EA: CPT | Performed by: INTERNAL MEDICINE

## 2024-07-13 PROCEDURE — 81001 URINALYSIS AUTO W/SCOPE: CPT

## 2024-07-13 PROCEDURE — 2709999900 HC NON-CHARGEABLE SUPPLY: Performed by: INTERNAL MEDICINE

## 2024-07-13 PROCEDURE — 6360000002 HC RX W HCPCS: Performed by: INTERNAL MEDICINE

## 2024-07-13 PROCEDURE — 80061 LIPID PANEL: CPT

## 2024-07-13 PROCEDURE — 6370000000 HC RX 637 (ALT 250 FOR IP): Performed by: NURSE PRACTITIONER

## 2024-07-13 PROCEDURE — 85347 COAGULATION TIME ACTIVATED: CPT

## 2024-07-13 PROCEDURE — C1887 CATHETER, GUIDING: HCPCS | Performed by: INTERNAL MEDICINE

## 2024-07-13 PROCEDURE — 99152 MOD SED SAME PHYS/QHP 5/>YRS: CPT | Performed by: INTERNAL MEDICINE

## 2024-07-13 PROCEDURE — C1713 ANCHOR/SCREW BN/BN,TIS/BN: HCPCS | Performed by: INTERNAL MEDICINE

## 2024-07-13 PROCEDURE — C8929 TTE W OR WO FOL WCON,DOPPLER: HCPCS

## 2024-07-13 PROCEDURE — 82962 GLUCOSE BLOOD TEST: CPT

## 2024-07-13 PROCEDURE — 71046 X-RAY EXAM CHEST 2 VIEWS: CPT

## 2024-07-13 PROCEDURE — B2111ZZ FLUOROSCOPY OF MULTIPLE CORONARY ARTERIES USING LOW OSMOLAR CONTRAST: ICD-10-PCS | Performed by: INTERNAL MEDICINE

## 2024-07-13 PROCEDURE — 84443 ASSAY THYROID STIM HORMONE: CPT

## 2024-07-13 PROCEDURE — 93458 L HRT ARTERY/VENTRICLE ANGIO: CPT | Performed by: INTERNAL MEDICINE

## 2024-07-13 PROCEDURE — 6360000004 HC RX CONTRAST MEDICATION: Performed by: HOSPITALIST

## 2024-07-13 PROCEDURE — 84484 ASSAY OF TROPONIN QUANT: CPT

## 2024-07-13 PROCEDURE — C1769 GUIDE WIRE: HCPCS | Performed by: INTERNAL MEDICINE

## 2024-07-13 PROCEDURE — 85025 COMPLETE CBC W/AUTO DIFF WBC: CPT

## 2024-07-13 PROCEDURE — 93005 ELECTROCARDIOGRAM TRACING: CPT | Performed by: STUDENT IN AN ORGANIZED HEALTH CARE EDUCATION/TRAINING PROGRAM

## 2024-07-13 PROCEDURE — 83880 ASSAY OF NATRIURETIC PEPTIDE: CPT

## 2024-07-13 PROCEDURE — 2060000000 HC ICU INTERMEDIATE R&B

## 2024-07-13 RX ORDER — ASPIRIN 81 MG/1
81 TABLET, CHEWABLE ORAL DAILY
Status: DISCONTINUED | OUTPATIENT
Start: 2024-07-14 | End: 2024-07-15

## 2024-07-13 RX ORDER — FENTANYL CITRATE 50 UG/ML
INJECTION, SOLUTION INTRAMUSCULAR; INTRAVENOUS PRN
Status: DISCONTINUED | OUTPATIENT
Start: 2024-07-13 | End: 2024-07-13 | Stop reason: HOSPADM

## 2024-07-13 RX ORDER — ASPIRIN 81 MG/1
324 TABLET, CHEWABLE ORAL ONCE
Status: COMPLETED | OUTPATIENT
Start: 2024-07-13 | End: 2024-07-13

## 2024-07-13 RX ORDER — HEPARIN SODIUM 10000 [USP'U]/100ML
5-30 INJECTION, SOLUTION INTRAVENOUS CONTINUOUS
Status: DISCONTINUED | OUTPATIENT
Start: 2024-07-13 | End: 2024-07-15

## 2024-07-13 RX ORDER — LIDOCAINE HYDROCHLORIDE 10 MG/ML
INJECTION, SOLUTION INFILTRATION; PERINEURAL PRN
Status: DISCONTINUED | OUTPATIENT
Start: 2024-07-13 | End: 2024-07-13 | Stop reason: HOSPADM

## 2024-07-13 RX ORDER — POTASSIUM CHLORIDE 750 MG/1
40 TABLET, FILM COATED, EXTENDED RELEASE ORAL PRN
Status: DISCONTINUED | OUTPATIENT
Start: 2024-07-13 | End: 2024-07-15

## 2024-07-13 RX ORDER — ROSUVASTATIN CALCIUM 40 MG/1
40 TABLET, COATED ORAL DAILY
Status: DISCONTINUED | OUTPATIENT
Start: 2024-07-13 | End: 2024-07-19 | Stop reason: HOSPADM

## 2024-07-13 RX ORDER — HEPARIN SODIUM 1000 [USP'U]/ML
INJECTION, SOLUTION INTRAVENOUS; SUBCUTANEOUS PRN
Status: DISCONTINUED | OUTPATIENT
Start: 2024-07-13 | End: 2024-07-13 | Stop reason: HOSPADM

## 2024-07-13 RX ORDER — POLYETHYLENE GLYCOL 3350 17 G/17G
17 POWDER, FOR SOLUTION ORAL 2 TIMES DAILY
Status: DISCONTINUED | OUTPATIENT
Start: 2024-07-13 | End: 2024-07-15

## 2024-07-13 RX ORDER — DOCUSATE SODIUM 100 MG/1
100 CAPSULE, LIQUID FILLED ORAL DAILY
Status: DISCONTINUED | OUTPATIENT
Start: 2024-07-13 | End: 2024-07-15

## 2024-07-13 RX ORDER — SODIUM CHLORIDE 0.9 % (FLUSH) 0.9 %
5-40 SYRINGE (ML) INJECTION PRN
Status: DISCONTINUED | OUTPATIENT
Start: 2024-07-13 | End: 2024-07-15

## 2024-07-13 RX ORDER — MAGNESIUM SULFATE IN WATER 40 MG/ML
2000 INJECTION, SOLUTION INTRAVENOUS PRN
Status: DISCONTINUED | OUTPATIENT
Start: 2024-07-13 | End: 2024-07-15

## 2024-07-13 RX ORDER — ACETAMINOPHEN 650 MG/1
650 SUPPOSITORY RECTAL EVERY 6 HOURS PRN
Status: DISCONTINUED | OUTPATIENT
Start: 2024-07-13 | End: 2024-07-15

## 2024-07-13 RX ORDER — VERAPAMIL HYDROCHLORIDE 2.5 MG/ML
INJECTION, SOLUTION INTRAVENOUS PRN
Status: DISCONTINUED | OUTPATIENT
Start: 2024-07-13 | End: 2024-07-13 | Stop reason: HOSPADM

## 2024-07-13 RX ORDER — SODIUM CHLORIDE 9 MG/ML
INJECTION, SOLUTION INTRAVENOUS CONTINUOUS
Status: DISCONTINUED | OUTPATIENT
Start: 2024-07-13 | End: 2024-07-15

## 2024-07-13 RX ORDER — ONDANSETRON 4 MG/1
4 TABLET, ORALLY DISINTEGRATING ORAL EVERY 8 HOURS PRN
Status: DISCONTINUED | OUTPATIENT
Start: 2024-07-13 | End: 2024-07-15

## 2024-07-13 RX ORDER — HEPARIN SODIUM 1000 [USP'U]/ML
30 INJECTION, SOLUTION INTRAVENOUS; SUBCUTANEOUS PRN
Status: DISCONTINUED | OUTPATIENT
Start: 2024-07-13 | End: 2024-07-15

## 2024-07-13 RX ORDER — ACETAMINOPHEN 325 MG/1
650 TABLET ORAL EVERY 6 HOURS PRN
Status: DISCONTINUED | OUTPATIENT
Start: 2024-07-13 | End: 2024-07-15

## 2024-07-13 RX ORDER — ASCORBIC ACID 500 MG
500 TABLET ORAL DAILY
COMMUNITY

## 2024-07-13 RX ORDER — NITROGLYCERIN 0.4 MG/1
0.4 TABLET SUBLINGUAL EVERY 5 MIN PRN
Status: DISCONTINUED | OUTPATIENT
Start: 2024-07-13 | End: 2024-07-15

## 2024-07-13 RX ORDER — ONDANSETRON 2 MG/ML
4 INJECTION INTRAMUSCULAR; INTRAVENOUS EVERY 6 HOURS PRN
Status: DISCONTINUED | OUTPATIENT
Start: 2024-07-13 | End: 2024-07-15

## 2024-07-13 RX ORDER — HEPARIN SODIUM 1000 [USP'U]/ML
60 INJECTION, SOLUTION INTRAVENOUS; SUBCUTANEOUS PRN
Status: DISCONTINUED | OUTPATIENT
Start: 2024-07-13 | End: 2024-07-15

## 2024-07-13 RX ORDER — HEPARIN SODIUM 1000 [USP'U]/ML
60 INJECTION, SOLUTION INTRAVENOUS; SUBCUTANEOUS ONCE
Status: COMPLETED | OUTPATIENT
Start: 2024-07-13 | End: 2024-07-13

## 2024-07-13 RX ORDER — MIDAZOLAM HYDROCHLORIDE 1 MG/ML
INJECTION INTRAMUSCULAR; INTRAVENOUS PRN
Status: DISCONTINUED | OUTPATIENT
Start: 2024-07-13 | End: 2024-07-13 | Stop reason: HOSPADM

## 2024-07-13 RX ORDER — SODIUM CHLORIDE 9 MG/ML
INJECTION, SOLUTION INTRAVENOUS PRN
Status: DISCONTINUED | OUTPATIENT
Start: 2024-07-13 | End: 2024-07-15

## 2024-07-13 RX ORDER — POLYETHYLENE GLYCOL 3350 17 G/17G
17 POWDER, FOR SOLUTION ORAL DAILY PRN
Status: DISCONTINUED | OUTPATIENT
Start: 2024-07-13 | End: 2024-07-13

## 2024-07-13 RX ORDER — DOCUSATE SODIUM 100 MG/1
100 CAPSULE, LIQUID FILLED ORAL AS NEEDED
COMMUNITY

## 2024-07-13 RX ORDER — PHENOL 1.4 %
1 AEROSOL, SPRAY (ML) MUCOUS MEMBRANE DAILY
COMMUNITY

## 2024-07-13 RX ORDER — POTASSIUM CHLORIDE 7.45 MG/ML
10 INJECTION INTRAVENOUS PRN
Status: DISCONTINUED | OUTPATIENT
Start: 2024-07-13 | End: 2024-07-15

## 2024-07-13 RX ORDER — ACETAMINOPHEN 325 MG/1
650 TABLET ORAL EVERY 4 HOURS PRN
Status: CANCELLED | OUTPATIENT
Start: 2024-07-13

## 2024-07-13 RX ORDER — SODIUM CHLORIDE 0.9 % (FLUSH) 0.9 %
5-40 SYRINGE (ML) INJECTION EVERY 12 HOURS SCHEDULED
Status: DISCONTINUED | OUTPATIENT
Start: 2024-07-13 | End: 2024-07-15

## 2024-07-13 RX ORDER — CARBOXYMETHYLCELLULOSE SODIUM 5 MG/ML
1 SOLUTION/ DROPS OPHTHALMIC 2 TIMES DAILY
COMMUNITY

## 2024-07-13 RX ADMIN — DOCUSATE SODIUM 100 MG: 100 CAPSULE, LIQUID FILLED ORAL at 14:56

## 2024-07-13 RX ADMIN — NITROGLYCERIN 0.4 MG: 0.4 TABLET, ORALLY DISINTEGRATING SUBLINGUAL at 10:22

## 2024-07-13 RX ADMIN — HEPARIN SODIUM 12 UNITS/KG/HR: 10000 INJECTION, SOLUTION INTRAVENOUS at 11:10

## 2024-07-13 RX ADMIN — ASPIRIN 324 MG: 81 TABLET, CHEWABLE ORAL at 10:22

## 2024-07-13 RX ADMIN — ROSUVASTATIN 40 MG: 40 TABLET, FILM COATED ORAL at 14:55

## 2024-07-13 RX ADMIN — POLYETHYLENE GLYCOL 3350 17 G: 17 POWDER, FOR SOLUTION ORAL at 14:56

## 2024-07-13 RX ADMIN — SODIUM CHLORIDE, PRESERVATIVE FREE 10 ML: 5 INJECTION INTRAVENOUS at 14:56

## 2024-07-13 RX ADMIN — SODIUM CHLORIDE: 9 INJECTION, SOLUTION INTRAVENOUS at 15:19

## 2024-07-13 RX ADMIN — SODIUM CHLORIDE, PRESERVATIVE FREE 10 ML: 5 INJECTION INTRAVENOUS at 20:35

## 2024-07-13 RX ADMIN — PERFLUTREN 1.5 ML: 6.52 INJECTION, SUSPENSION INTRAVENOUS at 15:13

## 2024-07-13 RX ADMIN — HEPARIN SODIUM 3900 UNITS: 1000 INJECTION INTRAVENOUS; SUBCUTANEOUS at 11:06

## 2024-07-13 ASSESSMENT — PAIN SCALES - GENERAL
PAINLEVEL_OUTOF10: 0
PAINLEVEL_OUTOF10: 4
PAINLEVEL_OUTOF10: 0

## 2024-07-13 ASSESSMENT — PAIN DESCRIPTION - ORIENTATION: ORIENTATION: MID

## 2024-07-13 ASSESSMENT — PAIN DESCRIPTION - DESCRIPTORS: DESCRIPTORS: OTHER (COMMENT)

## 2024-07-13 ASSESSMENT — PAIN DESCRIPTION - LOCATION: LOCATION: CHEST

## 2024-07-13 ASSESSMENT — PAIN - FUNCTIONAL ASSESSMENT
PAIN_FUNCTIONAL_ASSESSMENT: PREVENTS OR INTERFERES SOME ACTIVE ACTIVITIES AND ADLS
PAIN_FUNCTIONAL_ASSESSMENT: 0-10

## 2024-07-13 NOTE — ED PROVIDER NOTES
[facility] EMERGENCY DEP  EMERGENCY DEPARTMENT ENCOUNTER      Pt Name: Matthew Puente  MRN: [id number]  Birthdate 1940  Date of evaluation: 7/13/2024  Provider: Jane Rodas MD    CHIEF COMPLAINT       Chief Complaint   Patient presents with    Chest Pain     HISTORY OF PRESENT ILLNESS   (Location/Symptom, Timing/Onset, Context/Setting, Quality, Duration, Modifying Factors, Severity)  Note limiting factors.   HPI  83-year-old male presents to the ER with family for evaluation of substernal squeezing, pressure-like chest pain which began yesterday.  Reports intermittent radiation to the mid back.  Rates pain as 3-4 out of 10 in intensity currently.  He denies associated nausea, shortness of breath, diaphoresis, palpitations, dizziness, syncope.  Denies known personal cardiovascular risk factors or history, but also shares that he does not see a cardiologist, and has never had formal cardiac evaluation in the past.  Pertinent family history include a son who has had premature ACS in the past few years.     Review of External Medical Records:     Nursing Notes were reviewed.    REVIEW OF SYSTEMS    (2-9 systems for level 4, 10 or more for level 5)     Review of Systems   All other systems reviewed and are negative.      Except as noted above the remainder of the review of systems was reviewed and negative.       PAST MEDICAL HISTORY     Past Medical History:   Diagnosis Date    Glaucoma     Snoring          SURGICAL HISTORY       Past Surgical History:   Procedure Laterality Date    COLONOSCOPY  8-    normal - return in 10 years    VASECTOMY  1983         CURRENT MEDICATIONS       Previous Medications    AMMONIUM LACTATE (LAC-HYDRIN) 5 % LOTN LOTION    Apply topically as needed    AMOXICILLIN (AMOXIL) 500 MG CAPSULE    Take 1 capsule by mouth 2 times daily    ASPIRIN 81 MG EC TABLET    Take 1 tablet by mouth daily    CLINDAMYCIN (CLEOCIN T) 1 % LOTION    Apply topically 2 times daily as needed Apply     Result Value    Sodium 135 (*)     Anion Gap 1 (*)     Glucose 102 (*)     BUN/Creatinine Ratio 9 (*)     Est, Glom Filt Rate 57 (*)     All other components within normal limits   TROPONIN - Abnormal; Notable for the following components:    Troponin, High Sensitivity 998 (*)     All other components within normal limits   LIPASE   BRAIN NATRIURETIC PEPTIDE   EXTRA TUBES HOLD   CBC WITH AUTO DIFFERENTIAL   APTT   PROTIME-INR   HEPARIN, ANTI-XA   HEPARIN, ANTI-XA   HEPARIN, ANTI-XA   CBC       All other labs were within normal range or not returned as of this dictation.    EMERGENCY DEPARTMENT COURSE and DIFFERENTIAL DIAGNOSIS/MDM:   Vitals:    Vitals:    07/13/24 0912 07/13/24 1022   BP: (!) 150/71 (!) 148/64   Pulse: (!) 48 (!) 46   Resp: 16    Temp: 98.2 °F (36.8 °C)    TempSrc: Oral    SpO2: 99%    Weight: 64.3 kg (141 lb 12.1 oz)        Medical Decision Making  83-year-old male presents to the ER with family for evaluation of substernal squeezing, pressure-like chest pain which began yesterday.  Reports intermittent radiation to the mid back.  Rates pain as 3-4 out of 10 in intensity currently.  He denies associated nausea, shortness of breath, diaphoresis, palpitations, dizziness, syncope.  Denies known personal cardiovascular risk factors or history, but also shares that he does not see a cardiologist, and has never had formal cardiac evaluation in the past.  Pertinent family history include a son who has had premature ACS in the past few years.     Patient is bradycardic in the ER, with heart rate largely in the 40s.  Slightly hypertensive.  Patient reports likely history of chronic baseline bradycardia, though he does not seem to be sure of this.  He is EKG is concerning for some nonspecific ST changes.  Differentials considered include ACS, esophagitis, musculoskeletal chest pain, pneumonia, pleurisy, aortic dissection, PE, etc.    Plan: EKG, labs, CXR. Administer full dose ASA, and nitro SL now while

## 2024-07-13 NOTE — BRIEF OP NOTE
Brief Postoperative Note      Patient: Matthew Puente  YOB: 1940  MRN: [id number]    Date of Procedure: 7/13/2024    Pre-Op Diagnosis Codes:     * Chest pain [R07.9]    Post-Op Diagnosis:  LM and 3v CAD, nml LVEDP       Procedure(s):  Left heart cath / coronary angiography  Percutaneous coronary intervention  Instantaneous wave-free ratio (iFR)    Surgeon(s):  Óscar Walters III, DO    Assistant:  * No surgical staff found *    Anesthesia: IV Sedation    Estimated Blood Loss (mL): Minimal    Complications: None    Specimens:   * No specimens in log *    Implants:  * No implants in log *      Drains: * No LDAs found *    Findings:  Infection Present At Time Of Surgery (PATOS) (choose all levels that have infection present):  No infection present  Other Findings: CTS consult    Electronically signed by Óscar Walters DO on 7/13/2024 at 12:48 PM

## 2024-07-13 NOTE — PROGRESS NOTES
Cardiac Cath Lab Procedure Area Arrival Note:    Matthew Puente arrived to Cardiac Cath Lab, Procedure Area. Patient identifiers verified with NAME and DATE OF BIRTH. Procedure verified with patient. Consent forms verified. Allergies verified. Patient informed of procedure and plan of care. Questions answered with review. Patient voiced understanding of procedure and plan of care.    Patient on cardiac monitor, non-invasive blood pressure, SPO2 monitor. On room air. Patient status doing well without problems. Patient is A&Ox 4. Patient reports no chest pain.     Patient medicated during procedure with orders obtained and verified by Dr. Walters.    Refer to patients Cardiac Cath Lab PROCEDURE REPORT for vital signs, assessment, status, and response during procedure, printed at end of case. Printed report on chart or scanned into chart.

## 2024-07-13 NOTE — PROGRESS NOTES
Cath noted 3 vessel CAD. Lcx likely culprit of NSTEMI. Ct surgery consulted for CABG - discussed with Dr. Walker. He is asymptomatic right now. Resume heparin infusion two hours after TR band is removed and there is hemostasis.     Discussed with patient and daughters.    Michael Brown MD  General Cardiology  [facility]  07/13/24

## 2024-07-13 NOTE — CONSULTS
[facility] Cardiology Consultation    Date of Consult:  07/13/24  Date of Admission: 7/13/2024  Primary Cardiologist: none  Physician Requesting consult: Dr. Rodas    Assessment/Recommendations:  NSTEMI, possible CHITO - no clear ST elevation but has non specific inferior changes with lateral ST depression. Echo with inferior to inferolateral hypokinesis suggestive of ischemia in RCA/Lcx overlap territory. LVEF normal. Still noting some chest discomfort. Discussed risks/benefits of cath and he and daughters agree to proceed.   - start heparin infusion  - s/p aspirin  - discussed with Dr. Walters, who will take him to cath lab    Thank you for the opportunity to participate in the care of Matthew Puente and please do not hesitate to contact us should you have any questions.      Chief Complaint / Reason for Consult:   Chest pain    History of Present Illness:  Matthew Puente is a 83 y.o. male with the below listed medical history who presented with chest pain. He has noted intermittent chest pain with exertion recently. Since yesterday he has been noted midsternal chest pain going to his back which has persisted. No associated  nausea, dyspnea or diaphoresis.    Still noting mild 3/10 chest pain after NTG.     No prior cardiac history.    Father and son have h/o CAD.    Past Medical History:   Diagnosis Date    Glaucoma     Snoring        Prior to Admission medications    Medication Sig Start Date End Date Taking? Authorizing Provider   amoxicillin (AMOXIL) 500 MG capsule Take 1 capsule by mouth 2 times daily 6/18/24   Elena Grier MD   lactulose (CHRONULAC) 10 GM/15ML solution Take 45 mLs by mouth every evening 5/16/24   Sushant Larsen MD   vitamin E 400 UNIT capsule Take 1 capsule by mouth daily    ProviderElena MD   clobetasol (TEMOVATE) 0.05 % cream Apply topically 2 times daily Apply topically 2 times daily.    Elena Grier MD   clindamycin (CLEOCIN T) 1 % lotion Apply topically 2  distress, lungs clear to auscultation   Abdomen: soft, non distended  MSK: no lower extremity edema  Skin: warm to touch  Neuro: alert and oriented, answered questions appropriately  Psych: normal mood and affect     Lab Review:  CBC:  Recent Labs     07/13/24  1043   WBC 5.7   RBC 4.93   HGB 15.4   HCT 46.2   MCV 93.7   MCH 31.2   MCHC 33.3   RDW 11.8      MPV 10.9       CMP:    Recent Labs     07/13/24  0921   *   K 4.1      CO2 29   BUN 11   CREATININE 1.24   GLUCOSE 102*   CALCIUM 9.6   BILITOT 0.7   ALKPHOS 56   AST 21   ALT 21           All Cardiac Markers in the last 24 hours:    Tn 998      Data Review:  EKG independently reviewed: sinus zakia, NS inferior ST elevation < 1 mm lead III with lateral ST flattening    Echocardiogram: bedside by me showed LVEF 55-60% with inferior/inferolateral hypokinesis      Other imaging:   Chest xray independently reviewed: unremarkable    Total critical care time - 45 minutes     MDM:  High  Risk of decompensation:  High    I personally spent the above critical care time.  This is time spent at this critically ill patient's bedside / unit / floor actively involved in patient care as well as the coordination of care and discussions with the patient's family.  This does not include any procedural time which has been billed separately.       Signed:  Michael Brown MD  General Cardiology  [facility]  07/13/24

## 2024-07-13 NOTE — ED TRIAGE NOTES
Pt c/o mid chest pain that started yesterday, intermittent, denies sob, denies cough or fever, radiates to right side, denies rash , denies leg swelling , drinking warm water relieves the pain , denies leg swelling

## 2024-07-13 NOTE — PROGRESS NOTES
TRANSFER - IN REPORT:    Verbal report received from Cath lab, RN on Matthew Puente  being received from Cath lab for routine progression of patient care      Report consisted of patient's Situation, Background, Assessment and   Recommendations(SBAR).     Information from the following report(s) Nurse Handoff Report, MAR, and Recent Results was reviewed with the receiving nurse.    Opportunity for questions and clarification was provided.      Assessment completed upon patient's arrival to unit and care assumed.         1319: Patient arrived from [facility] with two Rns  via stretcher and monitor. Patient transported to bed and placed on bedside monitor.     1445: Withdrew 3 ml of air from Right TR band; patient immediately started bleeding. 3 mls of air placed back. Will wait two-hours and attempt to again.    1745: TR band removed per protocol. 4x4 guaze and Tegaderm placed.

## 2024-07-13 NOTE — PROGRESS NOTES
TRANSFER - OUT REPORT:    Verbal report given to MITCHEL Casillas on Matthew Puente  being transferred to [facility] for routine progression of patient care       Report consisted of patient's Situation, Background, Assessment and   Recommendations(SBAR).     Information from the following report(s) Nurse Handoff Report was reviewed with the receiving nurse.           Lines:   Peripheral IV 07/13/24 Left Antecubital (Active)   Site Assessment Clean, dry & intact 07/13/24 0921   Line Status Blood return noted;Capped;Normal saline locked;Specimen collected 07/13/24 0921   Alcohol Cap Used Yes 07/13/24 0921   Dressing Status Clean, dry & intact 07/13/24 0921   Dressing Type Transparent 07/13/24 0921       Peripheral IV 07/13/24 Right;Anterior Forearm (Active)        Opportunity for questions and clarification was provided.      Patient transported with:  Monitor and Registered Nurse

## 2024-07-13 NOTE — ED NOTES
TRANSFER - OUT REPORT:    Verbal report given to Veronica on Matthew M Cindi  being transferred to Cath lab for ordered procedure       Report consisted of patient's Situation, Background, Assessment and   Recommendations(SBAR).     Information from the following report(s) Nurse Handoff Report, Index, ED Encounter Summary, ED SBAR, and Adult Overview was reviewed with the receiving nurse.    [place] Fall Assessment:    Presents to emergency department  because of falls (Syncope, seizure, or loss of consciousness): No  Age > 70: Yes  Altered Mental Status, Intoxication with alcohol or substance confusion (Disorientation, impaired judgment, poor safety awaremess, or inability to follow instructions): No  Impaired Mobility: Ambulates or transfers with assistive devices or assistance; Unable to ambulate or transer.: No  Nursing Judgement: Yes          Lines:   Peripheral IV 07/13/24 Left Antecubital (Active)   Site Assessment Clean, dry & intact 07/13/24 0921   Line Status Blood return noted;Capped;Normal saline locked;Specimen collected 07/13/24 0921   Alcohol Cap Used Yes 07/13/24 0921   Dressing Status Clean, dry & intact 07/13/24 0921   Dressing Type Transparent 07/13/24 0921       Peripheral IV 07/13/24 Right;Anterior Forearm (Active)        Opportunity for questions and clarification was provided.      Patient transported with:  Monitor and Registered Nurse

## 2024-07-13 NOTE — PROGRESS NOTES
CATH LAB to RECOVERY ROOM REPORT    Procedure: [facility]    MD: MARIELOS Walters MD    The procedure was diagnostic only.    Verbal Report given to Recovery Nurse on patient being transferred to Cardiac Cath Lab RR for routine post-op. Patient stable upon transfer to .  Vitals, mental status, MAR, procedural summary discussed with recovery RN.    Medication given during procedure:    Contrast: 70 mL                          Heparin: 5,000 units     Versed: 1 mg                                                               Fentanyl: 25 mcg                                                           Sheaths:    Right radial sheath pulled at 12:45 pm, band at 12 mL of air

## 2024-07-13 NOTE — H&P
History and Physical    Date of Service:  7/13/2024  Primary Care Provider: Sushant Larsen MD  Source of information: patient    Chief Complaint: Chest Pain      History of Presenting Illness:   Matthew Puente is a 83 y.o. male with pmhx of glaucoma and constipation who presents with squeezing substernal pressure like chest pain which began yesterday.   Reports intermittent radiation to the mid back.  Rates pain as 3-4 out of 10 in intensity currently.  He denies associated nausea, shortness of breath, diaphoresis, palpitations, dizziness, syncope.  Denies known personal cardiovascular risk factors or history, but also shares that he does not see a cardiologist, and has never had formal cardiac evaluation in the past.  Pertinent family history include a son who has had premature ACS in the past few years. Workup in ED showed. Wbc 5.7, Hg 15.4, plt 159, , K 4.2, BUN 9, Cr. 1.24, glucose 104, trop 998. CXR negative for acute process, Cardiology evaluated patient and noted some wall motion abnormalities on bedside u/s.  Patient scheduled to go to cath lab soon and will be admitted to hospitalist service.           REVIEW OF SYSTEMS:  A comprehensive review of systems was negative except for that written in the History of Present Illness.     Past Medical History:   Diagnosis Date    Glaucoma     Snoring       Past Surgical History:   Procedure Laterality Date    COLONOSCOPY  8-    normal - return in 10 years    VASECTOMY  1983     Prior to Admission medications    Medication Sig Start Date End Date Taking? Authorizing Provider   amoxicillin (AMOXIL) 500 MG capsule Take 1 capsule by mouth 2 times daily 6/18/24   Elena Grier MD   lactulose (CHRONULAC) 10 GM/15ML solution Take 45 mLs by mouth every evening 5/16/24   Sushant Larsen MD   vitamin E 400 UNIT capsule Take 1 capsule by mouth daily    Elena Grier MD   clobetasol (TEMOVATE) 0.05 % cream Apply topically 2

## 2024-07-14 ENCOUNTER — APPOINTMENT (OUTPATIENT)
Facility: HOSPITAL | Age: 84
End: 2024-07-14
Payer: MEDICARE

## 2024-07-14 LAB
ANION GAP SERPL CALC-SCNC: 4 MMOL/L (ref 5–15)
ARTERIAL PATENCY WRIST A: ABNORMAL
BASE EXCESS BLD CALC-SCNC: 0.6 MMOL/L
BASOPHILS # BLD: 0 K/UL (ref 0–0.1)
BASOPHILS NFR BLD: 0 % (ref 0–1)
BDY SITE: ABNORMAL
BUN SERPL-MCNC: 10 MG/DL (ref 6–20)
BUN/CREAT SERPL: 10 (ref 12–20)
CALCIUM SERPL-MCNC: 9 MG/DL (ref 8.5–10.1)
CHLORIDE SERPL-SCNC: 108 MMOL/L (ref 97–108)
CO2 SERPL-SCNC: 25 MMOL/L (ref 21–32)
CREAT SERPL-MCNC: 1.05 MG/DL (ref 0.7–1.3)
DIFFERENTIAL METHOD BLD: ABNORMAL
ECHO AO ASC DIAM: 3.1 CM
ECHO AO ROOT DIAM: 3.6 CM
ECHO AR MAX VEL PISA: 4.3 M/S
ECHO AV AREA PEAK VELOCITY: 2.1 CM2
ECHO AV AREA VTI: 2.2 CM2
ECHO AV MEAN GRADIENT: 3 MMHG
ECHO AV MEAN VELOCITY: 0.8 M/S
ECHO AV PEAK GRADIENT: 8 MMHG
ECHO AV PEAK VELOCITY: 1.4 M/S
ECHO AV REGURGITANT PHT: 768.1 MILLISECOND
ECHO AV VELOCITY RATIO: 0.64
ECHO AV VTI: 32.5 CM
ECHO BSA: 1.65 M2
ECHO LA DIAMETER: 3 CM
ECHO LA TO AORTIC ROOT RATIO: 0.83
ECHO LA VOL A-L A4C: 41 ML (ref 18–58)
ECHO LA VOL MOD A4C: 39 ML (ref 18–58)
ECHO LV E' LATERAL VELOCITY: 8 CM/S
ECHO LV E' SEPTAL VELOCITY: 6 CM/S
ECHO LV FRACTIONAL SHORTENING: 25 % (ref 28–44)
ECHO LV INTERNAL DIMENSION DIASTOLIC: 4 CM (ref 4.2–5.9)
ECHO LV INTERNAL DIMENSION SYSTOLIC: 3 CM
ECHO LV IVSD: 0.8 CM (ref 0.6–1)
ECHO LV MASS 2D: 101.4 G (ref 88–224)
ECHO LV POSTERIOR WALL DIASTOLIC: 0.9 CM (ref 0.6–1)
ECHO LV RELATIVE WALL THICKNESS RATIO: 0.45
ECHO LVOT AREA: 3.1 CM2
ECHO LVOT AV VTI INDEX: 0.68
ECHO LVOT DIAM: 2 CM
ECHO LVOT MEAN GRADIENT: 1 MMHG
ECHO LVOT PEAK GRADIENT: 3 MMHG
ECHO LVOT PEAK VELOCITY: 0.9 M/S
ECHO LVOT SV: 69.1 ML
ECHO LVOT VTI: 22 CM
ECHO MV A VELOCITY: 0.43 M/S
ECHO MV AREA PHT: 2.9 CM2
ECHO MV E DECELERATION TIME (DT): 260.8 MS
ECHO MV E VELOCITY: 0.68 M/S
ECHO MV E/A RATIO: 1.58
ECHO MV E/E' LATERAL: 8.5
ECHO MV E/E' RATIO (AVERAGED): 9.92
ECHO MV E/E' SEPTAL: 11.33
ECHO MV PRESSURE HALF TIME (PHT): 75.6 MS
ECHO MV REGURGITANT PEAK GRADIENT: 92 MMHG
ECHO MV REGURGITANT PEAK VELOCITY: 4.8 M/S
ECHO PV MAX VELOCITY: 1 M/S
ECHO PV PEAK GRADIENT: 4 MMHG
ECHO RV FREE WALL PEAK S': 15 CM/S
ECHO RV TAPSE: 2.7 CM (ref 1.7–?)
ECHO TV REGURGITANT MAX VELOCITY: 2.61 M/S
ECHO TV REGURGITANT PEAK GRADIENT: 27 MMHG
EKG ATRIAL RATE: 42 BPM
EKG DIAGNOSIS: NORMAL
EKG P AXIS: 38 DEGREES
EKG P-R INTERVAL: 158 MS
EKG Q-T INTERVAL: 442 MS
EKG QRS DURATION: 92 MS
EKG QTC CALCULATION (BAZETT): 369 MS
EKG R AXIS: 9 DEGREES
EKG T AXIS: 60 DEGREES
EKG VENTRICULAR RATE: 42 BPM
EOSINOPHIL # BLD: 0.1 K/UL (ref 0–0.4)
EOSINOPHIL NFR BLD: 2 % (ref 0–7)
ERYTHROCYTE [DISTWIDTH] IN BLOOD BY AUTOMATED COUNT: 11.9 % (ref 11.5–14.5)
GAS FLOW.O2 O2 DELIVERY SYS: ABNORMAL
GLUCOSE BLD STRIP.AUTO-MCNC: 112 MG/DL (ref 65–117)
GLUCOSE BLD STRIP.AUTO-MCNC: 145 MG/DL (ref 65–117)
GLUCOSE SERPL-MCNC: 109 MG/DL (ref 65–100)
HCO3 BLD-SCNC: 24.9 MMOL/L (ref 22–26)
HCT VFR BLD AUTO: 42.9 % (ref 36.6–50.3)
HGB BLD-MCNC: 14.2 G/DL (ref 12.1–17)
IMM GRANULOCYTES # BLD AUTO: 0 K/UL (ref 0–0.04)
IMM GRANULOCYTES NFR BLD AUTO: 0 % (ref 0–0.5)
LYMPHOCYTES # BLD: 1.7 K/UL (ref 0.8–3.5)
LYMPHOCYTES NFR BLD: 22 % (ref 12–49)
MCH RBC QN AUTO: 31 PG (ref 26–34)
MCHC RBC AUTO-ENTMCNC: 33.1 G/DL (ref 30–36.5)
MCV RBC AUTO: 93.7 FL (ref 80–99)
MONOCYTES # BLD: 0.9 K/UL (ref 0–1)
MONOCYTES NFR BLD: 12 % (ref 5–13)
NEUTS SEG # BLD: 5 K/UL (ref 1.8–8)
NEUTS SEG NFR BLD: 64 % (ref 32–75)
NRBC # BLD: 0 K/UL (ref 0–0.01)
NRBC BLD-RTO: 0 PER 100 WBC
PCO2 BLD: 38.3 MMHG (ref 35–45)
PH BLD: 7.42 (ref 7.35–7.45)
PLATELET # BLD AUTO: 141 K/UL (ref 150–400)
PMV BLD AUTO: 9.9 FL (ref 8.9–12.9)
PO2 BLD: 79 MMHG (ref 80–100)
POTASSIUM SERPL-SCNC: 3.9 MMOL/L (ref 3.5–5.1)
RBC # BLD AUTO: 4.58 M/UL (ref 4.1–5.7)
SAO2 % BLD: 95.9 % (ref 92–97)
SERVICE CMNT-IMP: ABNORMAL
SERVICE CMNT-IMP: NORMAL
SODIUM SERPL-SCNC: 137 MMOL/L (ref 136–145)
SPECIMEN TYPE: ABNORMAL
TROPONIN I SERPL HS-MCNC: ABNORMAL NG/L (ref 0–76)
UFH PPP CHRO-ACNC: 0.34 IU/ML
UFH PPP CHRO-ACNC: 0.38 IU/ML
VAS LEFT ABI: 1
VAS LEFT ARM BP: 116 MMHG
VAS LEFT DORSALIS PEDIS BP: 113 MMHG
VAS LEFT GSV ANKLE DIAM: 2.3 MM
VAS LEFT GSV AT KNEE DIAM: 2.5 MM
VAS LEFT GSV BK PROX DIAM: 2.5 MM
VAS LEFT GSV THIGH DIST DIAM: 2.7 MM
VAS LEFT GSV THIGH MID DIAM: 2.7 MM
VAS LEFT GSV THIGH PROX DIAM: 2.8 MM
VAS LEFT PTA BP: 122 MMHG
VAS LEFT SSV DIST DIAM: 1.9 MM
VAS LEFT SSV PROX DIAM: 1.5 MM
VAS RIGHT ABI: 1.05
VAS RIGHT ARM BP: 122 MMHG
VAS RIGHT DORSALIS PEDIS BP: 107 MMHG
VAS RIGHT GSV ANKLE DIAM: 2 MM
VAS RIGHT GSV AT KNEE DIAM: 2.4 MM
VAS RIGHT GSV BK PROX DIAM: 2.4 MM
VAS RIGHT GSV THIGH DIST DIAM: 2.6 MM
VAS RIGHT GSV THIGH MID DIAM: 2.3 MM
VAS RIGHT GSV THIGH PROX DIAM: 2.7 MM
VAS RIGHT PTA BP: 128 MMHG
VAS RIGHT SSV PROX DIAM: 1.6 MM
WBC # BLD AUTO: 7.8 K/UL (ref 4.1–11.1)

## 2024-07-14 PROCEDURE — 86901 BLOOD TYPING SEROLOGIC RH(D): CPT

## 2024-07-14 PROCEDURE — 84484 ASSAY OF TROPONIN QUANT: CPT

## 2024-07-14 PROCEDURE — 2060000000 HC ICU INTERMEDIATE R&B

## 2024-07-14 PROCEDURE — 6360000002 HC RX W HCPCS: Performed by: NURSE PRACTITIONER

## 2024-07-14 PROCEDURE — 2580000003 HC RX 258: Performed by: NURSE PRACTITIONER

## 2024-07-14 PROCEDURE — 6370000000 HC RX 637 (ALT 250 FOR IP): Performed by: NURSE PRACTITIONER

## 2024-07-14 PROCEDURE — 36415 COLL VENOUS BLD VENIPUNCTURE: CPT

## 2024-07-14 PROCEDURE — 80048 BASIC METABOLIC PNL TOTAL CA: CPT

## 2024-07-14 PROCEDURE — 85520 HEPARIN ASSAY: CPT

## 2024-07-14 PROCEDURE — 86850 RBC ANTIBODY SCREEN: CPT

## 2024-07-14 PROCEDURE — 82962 GLUCOSE BLOOD TEST: CPT

## 2024-07-14 PROCEDURE — 86900 BLOOD TYPING SEROLOGIC ABO: CPT

## 2024-07-14 PROCEDURE — 93010 ELECTROCARDIOGRAM REPORT: CPT | Performed by: INTERNAL MEDICINE

## 2024-07-14 PROCEDURE — 36600 WITHDRAWAL OF ARTERIAL BLOOD: CPT

## 2024-07-14 PROCEDURE — 82803 BLOOD GASES ANY COMBINATION: CPT

## 2024-07-14 PROCEDURE — 85025 COMPLETE CBC W/AUTO DIFF WBC: CPT

## 2024-07-14 PROCEDURE — 93922 UPR/L XTREMITY ART 2 LEVELS: CPT

## 2024-07-14 PROCEDURE — 6370000000 HC RX 637 (ALT 250 FOR IP): Performed by: STUDENT IN AN ORGANIZED HEALTH CARE EDUCATION/TRAINING PROGRAM

## 2024-07-14 PROCEDURE — 86923 COMPATIBILITY TEST ELECTRIC: CPT

## 2024-07-14 RX ORDER — NITROGLYCERIN 20 MG/100ML
5-200 INJECTION INTRAVENOUS CONTINUOUS
Status: DISCONTINUED | OUTPATIENT
Start: 2024-07-14 | End: 2024-07-15

## 2024-07-14 RX ORDER — CHLORHEXIDINE GLUCONATE ORAL RINSE 1.2 MG/ML
15 SOLUTION DENTAL 2 TIMES DAILY
Status: DISCONTINUED | OUTPATIENT
Start: 2024-07-14 | End: 2024-07-15

## 2024-07-14 RX ORDER — SODIUM CHLORIDE 0.9 % (FLUSH) 0.9 %
5-40 SYRINGE (ML) INJECTION PRN
Status: DISCONTINUED | OUTPATIENT
Start: 2024-07-14 | End: 2024-07-15

## 2024-07-14 RX ORDER — SODIUM CHLORIDE 0.9 % (FLUSH) 0.9 %
5-40 SYRINGE (ML) INJECTION EVERY 8 HOURS
Status: DISCONTINUED | OUTPATIENT
Start: 2024-07-15 | End: 2024-07-15

## 2024-07-14 RX ORDER — BUTALBITAL, ACETAMINOPHEN AND CAFFEINE 50; 325; 40 MG/1; MG/1; MG/1
1 TABLET ORAL EVERY 4 HOURS PRN
Status: DISCONTINUED | OUTPATIENT
Start: 2024-07-14 | End: 2024-07-15

## 2024-07-14 RX ADMIN — MUPIROCIN: 20 OINTMENT TOPICAL at 11:42

## 2024-07-14 RX ADMIN — POLYETHYLENE GLYCOL 3350 17 G: 17 POWDER, FOR SOLUTION ORAL at 09:23

## 2024-07-14 RX ADMIN — ASPIRIN 81 MG CHEWABLE TABLET 81 MG: 81 TABLET CHEWABLE at 09:23

## 2024-07-14 RX ADMIN — DOCUSATE SODIUM 100 MG: 100 CAPSULE, LIQUID FILLED ORAL at 09:23

## 2024-07-14 RX ADMIN — ROSUVASTATIN 40 MG: 40 TABLET, FILM COATED ORAL at 09:25

## 2024-07-14 RX ADMIN — SODIUM CHLORIDE: 9 INJECTION, SOLUTION INTRAVENOUS at 02:58

## 2024-07-14 RX ADMIN — ACETAMINOPHEN 650 MG: 325 TABLET ORAL at 23:14

## 2024-07-14 RX ADMIN — NITROGLYCERIN 5 MCG/MIN: 20 INJECTION INTRAVENOUS at 09:04

## 2024-07-14 RX ADMIN — SODIUM CHLORIDE, PRESERVATIVE FREE 10 ML: 5 INJECTION INTRAVENOUS at 09:02

## 2024-07-14 RX ADMIN — CHLORHEXIDINE GLUCONATE 15 ML: 1.2 RINSE ORAL at 11:42

## 2024-07-14 RX ADMIN — MUPIROCIN: 20 OINTMENT TOPICAL at 20:44

## 2024-07-14 RX ADMIN — CHLORHEXIDINE GLUCONATE 15 ML: 1.2 RINSE ORAL at 20:44

## 2024-07-14 RX ADMIN — ACETAMINOPHEN 650 MG: 325 TABLET ORAL at 16:25

## 2024-07-14 RX ADMIN — SODIUM CHLORIDE, PRESERVATIVE FREE 10 ML: 5 INJECTION INTRAVENOUS at 20:44

## 2024-07-14 RX ADMIN — POLYETHYLENE GLYCOL 3350 17 G: 17 POWDER, FOR SOLUTION ORAL at 20:43

## 2024-07-14 ASSESSMENT — PAIN DESCRIPTION - ORIENTATION
ORIENTATION: LEFT
ORIENTATION: POSTERIOR

## 2024-07-14 ASSESSMENT — PAIN DESCRIPTION - DESCRIPTORS
DESCRIPTORS: DULL
DESCRIPTORS: ACHING

## 2024-07-14 ASSESSMENT — PAIN SCALES - GENERAL
PAINLEVEL_OUTOF10: 3
PAINLEVEL_OUTOF10: 0
PAINLEVEL_OUTOF10: 3
PAINLEVEL_OUTOF10: 0

## 2024-07-14 ASSESSMENT — PAIN DESCRIPTION - LOCATION
LOCATION: HEAD
LOCATION: HEAD

## 2024-07-14 NOTE — PLAN OF CARE
0730: Bedside and Verbal shift change report given to MITCHEL Lawson (oncoming nurse) by MITCHEL Vinson (offgoing nurse). Report included the following information Nurse Handoff Report, Intake/Output, and Cardiac Rhythm sinus zakia .     1055: therapy dog at bedside. Call bell within reach.    1930: Bedside and Verbal shift change report given to MITCHEL Caputo (oncoming nurse) by MITCHEL Lawson (offgoing nurse). Report included the following information Nurse Handoff Report, Intake/Output, and Cardiac Rhythm sinus zakia .     Problem: Discharge Planning  Goal: Discharge to home or other facility with appropriate resources  7/14/2024 1041 by Lulu Ybarra RN  Outcome: Progressing  7/13/2024 2325 by Alisson Espinal RN  Outcome: Progressing  Flowsheets  Taken 7/13/2024 2009 by Alisson Espinal RN  Discharge to home or other facility with appropriate resources: Identify barriers to discharge with patient and caregiver  Taken 7/13/2024 1625 by Willie Thorpe RN  Discharge to home or other facility with appropriate resources:   Identify barriers to discharge with patient and caregiver   Arrange for needed discharge resources and transportation as appropriate   Identify discharge learning needs (meds, wound care, etc)     Problem: Skin/Tissue Integrity  Goal: Absence of new skin breakdown  Description: 1.  Monitor for areas of redness and/or skin breakdown  2.  Assess vascular access sites hourly  3.  Every 4-6 hours minimum:  Change oxygen saturation probe site  4.  Every 4-6 hours:  If on nasal continuous positive airway pressure, respiratory therapy assess nares and determine need for appliance change or resting period.  7/14/2024 1041 by Lulu Ybarra RN  Outcome: Progressing  7/13/2024 2325 by Alisson Espinal RN  Outcome: Progressing     Problem: ABCDS Injury Assessment  Goal: Absence of physical injury  7/14/2024 1041 by Lulu Ybarra RN  Outcome: Progressing  7/13/2024 2325 by Alisson Espinal RN  Outcome:

## 2024-07-14 NOTE — PROGRESS NOTES
[facility]                                                                                          Hospitalist Progress Note  Uzma Brunson MD  Office Phone: [phone]        Date of Service:  2024  NAME:  Matthew Puente  :  1940  MRN:  [id number]       Admission Summary:   Quoted: \"Matthew Puente is a 83 y.o. male with pmhx of glaucoma and constipation who presents with squeezing substernal pressure like chest pain which began yesterday.   Reports intermittent radiation to the mid back.  Rates pain as 3-4 out of 10 in intensity currently.  He denies associated nausea, shortness of breath, diaphoresis, palpitations, dizziness, syncope.  Denies known personal cardiovascular risk factors or history, but also shares that he does not see a cardiologist, and has never had formal cardiac evaluation in the past.  Pertinent family history include a son who has had premature ACS in the past few years. Workup in ED showed. Wbc 5.7, Hg 15.4, plt 159, , K 4.2, BUN 9, Cr. 1.24, glucose 104, trop 998. CXR negative for acute process, Cardiology evaluated patient and noted some wall motion abnormalities on bedside u/s.  Patient scheduled to go to cath lab soon and will be admitted to hospitalist service. \"       Interval history / Subjective:   Seen with family at bedside, wet crackles on exam, decreased ivf rate to kvo based on exam and that he has diet and drinking fluids. He nai cp, troponins have not peaked     Assessment & Plan:      NSTEMI/CAD   3 vessel dz on cath by cardiologist  Denies current cp  Troponins resume to rise  On ntg gtt and heparin gtt and high dose statin  CTS to evaluate for possible cabg     Seen with family at bedside, wet crackles on exam, decreased ivf rate to kvo based on exam and that he has diet and drinking fluids. He nai cp, troponins have not peaked    Mild hyponatremia in setting of recent IVF, now changed to  potassium bicarb-citric acid (EFFER-K) effervescent tablet 40 mEq  40 mEq Oral PRN    Or    potassium chloride 10 mEq/100 mL IVPB (Peripheral Line)  10 mEq IntraVENous PRN    magnesium sulfate 2000 mg in 50 mL IVPB premix  2,000 mg IntraVENous PRN    ondansetron (ZOFRAN-ODT) disintegrating tablet 4 mg  4 mg Oral Q8H PRN    Or    ondansetron (ZOFRAN) injection 4 mg  4 mg IntraVENous Q6H PRN    acetaminophen (TYLENOL) tablet 650 mg  650 mg Oral Q6H PRN    Or    acetaminophen (TYLENOL) suppository 650 mg  650 mg Rectal Q6H PRN    0.9 % sodium chloride infusion   IntraVENous Continuous    rosuvastatin (CRESTOR) tablet 40 mg  40 mg Oral Daily    polyethylene glycol (GLYCOLAX) packet 17 g  17 g Oral BID    docusate sodium (COLACE) capsule 100 mg  100 mg Oral Daily    aspirin chewable tablet 81 mg  81 mg Oral Daily     ______________________________________________________________________  EXPECTED LENGTH OF STAY: 2  ACTUAL LENGTH OF STAY:          1                 Uzma Brunson MD

## 2024-07-14 NOTE — CONSENT
Informed Consent for Blood Component Transfusion Note    I have discussed with the patient the rationale for blood component transfusion; its benefits in treating or preventing fatigue, organ damage, or death; and its risk which includes mild transfusion reactions, rare risk of blood borne infection, or more serious but rare reactions. I have discussed the alternatives to transfusion, including the risk and consequences of not receiving transfusion. The patient had an opportunity to ask questions and had agreed to proceed with transfusion of blood components.    Electronically signed by GRACE MARCOS NP on 7/14/24 at 2:05 PM EDT   Cyclophosphamide Pregnancy And Lactation Text: This medication is Pregnancy Category D and it isn't considered safe during pregnancy. This medication is excreted in breast milk.

## 2024-07-14 NOTE — PROGRESS NOTES
4 Eyes Skin Assessment     NAME:  Matthew Puente  YOB: 1940  MEDICAL RECORD NUMBER:  [id number]    The patient is being assessed for  Admission    I agree that at least one RN has performed a thorough Head to Toe Skin Assessment on the patient. ALL assessment sites listed below have been assessed.      Areas assessed by both nurses:    Head, Face, Ears, Shoulders, Back, Chest, Arms, Elbows, Hands, Sacrum. Buttock, Coccyx, Ischium, and Legs. Feet and Heels        Does the Patient have a Wound? No noted wound(s)       Kalyan Prevention initiated by RN: Yes  Wound Care Orders initiated by RN: No    Pressure Injury (Stage 3,4, Unstageable, DTI, NWPT, and Complex wounds) if present, place Wound referral order by RN under : No    New Ostomies, if present place, Ostomy referral order under : No     Nurse 1 eSignature: Electronically signed by Alisson Espinal RN on 7/13/24 at 11:14 PM EDT    **SHARE this note so that the co-signing nurse can place an eSignature**    Nurse 2 eSignature: Electronically signed by Hardeep Antoine RN on 7/13/24 at 11:45 PM EDT

## 2024-07-14 NOTE — PROGRESS NOTES
Cardiology Progress Note  2024    Admit Date: 2024  Admit Diagnosis: Chest pain [R07.9]  NSTEMI (non-ST elevated myocardial infarction) (HCC) [I21.4]  CC:  Chest pain    Assessment/Recommendations:  NSTEMI, CHITO - cath noted 3 vessel CAD. Hemodynamically significant mid LAD stenosis per iFR. Several ostial and occluded distal Lcx which is likely culprit.  proximal RCA with left to right collaterals. Echo with low normal LVEF 50-55% with severe hypokinesis in Lcx territory  - noting minimal chest discomfort, start nitroglycerin infusion  - continue aspirin and heparin infusion  - continue rosuvastatin   - defer BB due to bradycardia  - trend troponin to peak  - CT surgery consulted for CABG - discussed with Dr. Walker    2. D    Thank you for this consult. We will continue to follow along. Please contact us for any further questions or concerns.    Hospital problem list     Principal Problem:    NSTEMI (non-ST elevated myocardial infarction) (HCC)  Resolved Problems:    * No resolved hospital problems. *                                                        Subjective:     Noting minimal chest discomfort. Otherwise no complaints.     ROS: All other systems reviewed and were negative other than mentioned above.                                            No change in family and social history from H&P/Consult note.    Objective:    Physical Exam:  24 hr VS reviewed, overall VSSAF  Temp (24hrs), Av.1 °F (36.7 °C), Min:97.5 °F (36.4 °C), Max:98.5 °F (36.9 °C)    Patient Vitals for the past 8 hrs:   Pulse   24 0921 (!) 48   24 0903 (!) 46   24 0700 (!) 46   24 0600 50   24 0359 (!) 47   24 0300 (!) 44   24 0200 (!) 42    Patient Vitals for the past 8 hrs:   Resp   24 0700 16   24 0300 15    Patient Vitals for the past 8 hrs:   BP   24 0921 (!) 140/49   24 0903 (!) 141/50   24 0700 (!) 118/47   24 0300

## 2024-07-14 NOTE — CONSULTS
CSS   History and Physical    Subjective:      Matthew Puente is a 83 y.o. male who was referred for cardiac evaluation by Dr. Walters. He has a PMH of glaucoma and constipation. He presented to the ED on  with complaints of squeezing substernal pressure, like chest pain which began the day prior. He endorsed intermittent radiation to the mid back, rated 3-4/10. Denied nausea, SOB, diaphoresis, palpitations, or dizziness. Troponin positive, taken for LHC which revealed MVD (LHC results below).     Mr. Puente states over the last few weeks he has noticed some increasing LOGAN and that his ability to work out has decreased (time on treadmill has become less and less with earlier fatigue and SOB). He states he has experienced some dull ache in mid sternum. On Friday night (), he started to experience a more noticeable chest pressure which radiated to his back and he presented to the ED as discussed. He denies any changes in his appetite or weight changes.     Mr. Puente lives alone (wife passed away ) and is independent of his ADLs at baseline. He is retired, previous . He denies any history of smoking, ETOH use, or recreational drugs. His father  from a MI at the age of 52 and his son had a STEMI requiring 2 stents at the age of 55.     Cardiac Testing    Cardiac catheterization:24 with Dr. Walters  Cors:      Dominance: [x] Right  [] Left  [] Mixed     LM: Large caliber vessel with ostial 40-50% eccentric stenosis     LAD: Large caliber vessel that wraps around the apex with two sequential mid stenoses up to 60%  D1: Moderate caliber vessel without significant stenosis.    D2: Moderate caliber vessel without significant stenosis.      LCX: Large caliber vessel with an ostial 80% stenosis.  The LCx is then occluded distal to OM2.  OM3 or LPDA appears to fills via left to left collaterals.  OM1: Moderate caliber vessel without significant stenosis.   OM2: Small caliber vessel

## 2024-07-14 NOTE — PLAN OF CARE
0529 Dr. Brown notified of troponin level 52,173. Pt asymptomatic, BP WNL, HR remains 40s-50s. Next trop due at 0900. No new orders received.    Problem: Discharge Planning  Goal: Discharge to home or other facility with appropriate resources  Outcome: Progressing  Flowsheets  Taken 7/13/2024 2009 by Alisson Espinal, RN  Discharge to home or other facility with appropriate resources: Identify barriers to discharge with patient and caregiver  Taken 7/13/2024 1625 by Willie Thorpe, RN  Discharge to home or other facility with appropriate resources:   Identify barriers to discharge with patient and caregiver   Arrange for needed discharge resources and transportation as appropriate   Identify discharge learning needs (meds, wound care, etc)     Problem: Skin/Tissue Integrity  Goal: Absence of new skin breakdown  Description: 1.  Monitor for areas of redness and/or skin breakdown  2.  Assess vascular access sites hourly  3.  Every 4-6 hours minimum:  Change oxygen saturation probe site  4.  Every 4-6 hours:  If on nasal continuous positive airway pressure, respiratory therapy assess nares and determine need for appliance change or resting period.  Outcome: Progressing     Problem: ABCDS Injury Assessment  Goal: Absence of physical injury  Outcome: Progressing     Problem: Safety - Adult  Goal: Free from fall injury  Outcome: Progressing

## 2024-07-15 ENCOUNTER — APPOINTMENT (OUTPATIENT)
Facility: HOSPITAL | Age: 84
End: 2024-07-15
Payer: MEDICARE

## 2024-07-15 ENCOUNTER — HOSPITAL ENCOUNTER (OUTPATIENT)
Facility: HOSPITAL | Age: 84
Discharge: HOME | End: 2024-07-17
Attending: THORACIC SURGERY (CARDIOTHORACIC VASCULAR SURGERY)

## 2024-07-15 ENCOUNTER — ANESTHESIA (OUTPATIENT)
Facility: HOSPITAL | Age: 84
End: 2024-07-15
Payer: MEDICARE

## 2024-07-15 ENCOUNTER — ANESTHESIA EVENT (OUTPATIENT)
Facility: HOSPITAL | Age: 84
End: 2024-07-15
Payer: MEDICARE

## 2024-07-15 DIAGNOSIS — I21.4 ACUTE MYOCARDIAL INFARCTION, SUBENDOCARDIAL INFARCTION, INITIAL EPISODE OF CARE (HCC): ICD-10-CM

## 2024-07-15 DIAGNOSIS — Z95.1 POSTSURGICAL AORTOCORONARY BYPASS STATUS: Primary | ICD-10-CM

## 2024-07-15 PROBLEM — R73.9 HYPERGLYCEMIA: Status: ACTIVE | Noted: 2024-07-15

## 2024-07-15 LAB
ABO + RH BLD: NORMAL
ACUTE KIDNEY INJURY RISK NEPHROCHECK: 0.83 (ref 0–0.3)
ALBUMIN SERPL-MCNC: 3.5 G/DL (ref 3.5–5)
ALBUMIN SERPL-MCNC: 4.2 G/DL (ref 3.5–5)
ALBUMIN/GLOB SERPL: 1.5 (ref 1.1–2.2)
ALBUMIN/GLOB SERPL: 1.8 (ref 1.1–2.2)
ALP SERPL-CCNC: 33 U/L (ref 45–117)
ALP SERPL-CCNC: 41 U/L (ref 45–117)
ALT SERPL-CCNC: 33 U/L (ref 12–78)
ALT SERPL-CCNC: 35 U/L (ref 12–78)
ANION GAP BLD CALC-SCNC: 10 (ref 10–20)
ANION GAP BLD CALC-SCNC: 11 (ref 10–20)
ANION GAP BLD CALC-SCNC: 12 (ref 10–20)
ANION GAP BLD CALC-SCNC: 13 (ref 10–20)
ANION GAP BLD CALC-SCNC: 14 (ref 10–20)
ANION GAP BLD CALC-SCNC: 7 (ref 10–20)
ANION GAP BLD CALC-SCNC: 9 (ref 10–20)
ANION GAP SERPL CALC-SCNC: 5 MMOL/L (ref 5–15)
ANION GAP SERPL CALC-SCNC: 6 MMOL/L (ref 5–15)
APTT PPP: 28.5 SEC (ref 22.1–31)
AST SERPL-CCNC: 137 U/L (ref 15–37)
AST SERPL-CCNC: 156 U/L (ref 15–37)
BASE DEFICIT BLD-SCNC: 1.1 MMOL/L
BASE DEFICIT BLD-SCNC: 2.4 MMOL/L
BASE DEFICIT BLD-SCNC: 2.5 MMOL/L
BASE DEFICIT BLD-SCNC: 2.6 MMOL/L
BASE DEFICIT BLD-SCNC: 3 MMOL/L
BASE EXCESS BLD CALC-SCNC: 0.2 MMOL/L
BASE EXCESS BLD CALC-SCNC: 0.3 MMOL/L
BASE EXCESS BLD CALC-SCNC: 0.7 MMOL/L
BASE EXCESS BLD CALC-SCNC: 0.9 MMOL/L
BASE EXCESS BLD CALC-SCNC: 4.5 MMOL/L
BDY SITE: ABNORMAL
BILIRUB SERPL-MCNC: 1.3 MG/DL (ref 0.2–1)
BILIRUB SERPL-MCNC: 1.3 MG/DL (ref 0.2–1)
BLD PROD TYP BPU: NORMAL
BLD PROD TYP BPU: NORMAL
BLOOD BANK DISPENSE STATUS: NORMAL
BLOOD BANK DISPENSE STATUS: NORMAL
BLOOD GROUP ANTIBODIES SERPL: NORMAL
BPU ID: NORMAL
BPU ID: NORMAL
BUN SERPL-MCNC: 10 MG/DL (ref 6–20)
BUN SERPL-MCNC: 13 MG/DL (ref 6–20)
BUN/CREAT SERPL: 10 (ref 12–20)
BUN/CREAT SERPL: 9 (ref 12–20)
CA-I BLD-MCNC: 0.97 MMOL/L (ref 1.12–1.32)
CA-I BLD-MCNC: 1.04 MMOL/L (ref 1.12–1.32)
CA-I BLD-MCNC: 1.06 MMOL/L (ref 1.12–1.32)
CA-I BLD-MCNC: 1.06 MMOL/L (ref 1.12–1.32)
CA-I BLD-MCNC: 1.19 MMOL/L (ref 1.12–1.32)
CA-I BLD-MCNC: 1.23 MMOL/L (ref 1.12–1.32)
CA-I BLD-MCNC: 1.23 MMOL/L (ref 1.12–1.32)
CA-I BLD-MCNC: 1.26 MMOL/L (ref 1.12–1.32)
CA-I BLD-MCNC: 1.33 MMOL/L (ref 1.12–1.32)
CALCIUM SERPL-MCNC: 8.4 MG/DL (ref 8.5–10.1)
CALCIUM SERPL-MCNC: 8.6 MG/DL (ref 8.5–10.1)
CHLORIDE BLD-SCNC: 100 MMOL/L (ref 100–108)
CHLORIDE BLD-SCNC: 103 MMOL/L (ref 100–108)
CHLORIDE BLD-SCNC: 104 MMOL/L (ref 100–108)
CHLORIDE SERPL-SCNC: 106 MMOL/L (ref 97–108)
CHLORIDE SERPL-SCNC: 107 MMOL/L (ref 97–108)
CO2 BLD-SCNC: 21 MMOL/L (ref 19–24)
CO2 BLD-SCNC: 22 MMOL/L (ref 19–24)
CO2 BLD-SCNC: 23 MMOL/L (ref 19–24)
CO2 BLD-SCNC: 23 MMOL/L (ref 19–24)
CO2 BLD-SCNC: 24 MMOL/L (ref 19–24)
CO2 BLD-SCNC: 24 MMOL/L (ref 19–24)
CO2 BLD-SCNC: 25 MMOL/L (ref 19–24)
CO2 BLD-SCNC: 25 MMOL/L (ref 19–24)
CO2 BLD-SCNC: 28 MMOL/L (ref 19–24)
CO2 SERPL-SCNC: 26 MMOL/L (ref 21–32)
CO2 SERPL-SCNC: 26 MMOL/L (ref 21–32)
CREAT SERPL-MCNC: 1.13 MG/DL (ref 0.7–1.3)
CREAT SERPL-MCNC: 1.25 MG/DL (ref 0.7–1.3)
CREAT UR-MCNC: 0.7 MG/DL (ref 0.6–1.3)
CREAT UR-MCNC: 0.8 MG/DL (ref 0.6–1.3)
CREAT UR-MCNC: 0.9 MG/DL (ref 0.6–1.3)
CREAT UR-MCNC: 0.9 MG/DL (ref 0.6–1.3)
CREAT UR-MCNC: 1 MG/DL (ref 0.6–1.3)
CREAT UR-MCNC: 1 MG/DL (ref 0.6–1.3)
CREAT UR-MCNC: 1.1 MG/DL (ref 0.6–1.3)
CREAT UR-MCNC: 1.3 MG/DL (ref 0.6–1.3)
CREAT UR-MCNC: 1.3 MG/DL (ref 0.6–1.3)
CROSSMATCH RESULT: NORMAL
CROSSMATCH RESULT: NORMAL
ECHO BSA: 1.66 M2
EKG ATRIAL RATE: 70 BPM
EKG DIAGNOSIS: NORMAL
EKG P-R INTERVAL: 170 MS
EKG Q-T INTERVAL: 414 MS
EKG QRS DURATION: 94 MS
EKG QTC CALCULATION (BAZETT): 447 MS
EKG R AXIS: -10 DEGREES
EKG T AXIS: 19 DEGREES
EKG VENTRICULAR RATE: 70 BPM
ERYTHROCYTE [DISTWIDTH] IN BLOOD BY AUTOMATED COUNT: 11.9 % (ref 11.5–14.5)
ERYTHROCYTE [DISTWIDTH] IN BLOOD BY AUTOMATED COUNT: 12 % (ref 11.5–14.5)
ERYTHROCYTE [DISTWIDTH] IN BLOOD BY AUTOMATED COUNT: 12.3 % (ref 11.5–14.5)
GLOBULIN SER CALC-MCNC: 2.4 G/DL (ref 2–4)
GLOBULIN SER CALC-MCNC: 2.4 G/DL (ref 2–4)
GLUCOSE BLD STRIP.AUTO-MCNC: 103 MG/DL (ref 65–117)
GLUCOSE BLD STRIP.AUTO-MCNC: 111 MG/DL (ref 74–99)
GLUCOSE BLD STRIP.AUTO-MCNC: 114 MG/DL (ref 74–99)
GLUCOSE BLD STRIP.AUTO-MCNC: 127 MG/DL (ref 74–99)
GLUCOSE BLD STRIP.AUTO-MCNC: 129 MG/DL (ref 74–99)
GLUCOSE BLD STRIP.AUTO-MCNC: 129 MG/DL (ref 74–99)
GLUCOSE BLD STRIP.AUTO-MCNC: 136 MG/DL (ref 74–99)
GLUCOSE BLD STRIP.AUTO-MCNC: 143 MG/DL (ref 65–117)
GLUCOSE BLD STRIP.AUTO-MCNC: 143 MG/DL (ref 65–117)
GLUCOSE BLD STRIP.AUTO-MCNC: 149 MG/DL (ref 74–99)
GLUCOSE BLD STRIP.AUTO-MCNC: 163 MG/DL (ref 74–99)
GLUCOSE BLD STRIP.AUTO-MCNC: 170 MG/DL (ref 65–117)
GLUCOSE BLD STRIP.AUTO-MCNC: 90 MG/DL (ref 65–117)
GLUCOSE BLD STRIP.AUTO-MCNC: 94 MG/DL (ref 74–99)
GLUCOSE SERPL-MCNC: 147 MG/DL (ref 65–100)
GLUCOSE SERPL-MCNC: 165 MG/DL (ref 65–100)
HCO3 BLD-SCNC: 23.6 MMOL/L (ref 22–26)
HCO3 BLDA-SCNC: 22 MMOL/L
HCO3 BLDA-SCNC: 22 MMOL/L
HCO3 BLDA-SCNC: 23 MMOL/L
HCO3 BLDA-SCNC: 24 MMOL/L
HCO3 BLDA-SCNC: 25 MMOL/L
HCO3 BLDA-SCNC: 26 MMOL/L
HCO3 BLDA-SCNC: 28 MMOL/L
HCT VFR BLD AUTO: 28.3 % (ref 36.6–50.3)
HCT VFR BLD AUTO: 32 % (ref 36.6–50.3)
HCT VFR BLD AUTO: 40.5 % (ref 36.6–50.3)
HGB BLD-MCNC: 10.3 G/DL (ref 12.1–17)
HGB BLD-MCNC: 11.5 G/DL (ref 12.1–17)
HGB BLD-MCNC: 14.1 G/DL (ref 12.1–17)
HISTORY CHECK: NORMAL
INR PPP: 1.2 (ref 0.9–1.1)
LACTATE BLD-SCNC: 1.61 MMOL/L (ref 0.4–2)
LACTATE BLD-SCNC: 2.2 MMOL/L (ref 0.4–2)
MAGNESIUM SERPL-MCNC: 2.2 MG/DL (ref 1.6–2.4)
MCH RBC QN AUTO: 32 PG (ref 26–34)
MCH RBC QN AUTO: 32.3 PG (ref 26–34)
MCH RBC QN AUTO: 32.9 PG (ref 26–34)
MCHC RBC AUTO-ENTMCNC: 34.8 G/DL (ref 30–36.5)
MCHC RBC AUTO-ENTMCNC: 35.9 G/DL (ref 30–36.5)
MCHC RBC AUTO-ENTMCNC: 36.4 G/DL (ref 30–36.5)
MCV RBC AUTO: 89.9 FL (ref 80–99)
MCV RBC AUTO: 90.4 FL (ref 80–99)
MCV RBC AUTO: 91.8 FL (ref 80–99)
NRBC # BLD: 0 K/UL (ref 0–0.01)
NRBC BLD-RTO: 0 PER 100 WBC
O2/TOTAL GAS SETTING VFR VENT: 40 %
PCO2 BLD: 34.8 MMHG (ref 35–45)
PCO2 BLD: 36.6 MMHG (ref 35–45)
PCO2 BLD: 37.1 MMHG (ref 35–45)
PCO2 BLD: 37.7 MMHG (ref 35–45)
PCO2 BLD: 38.1 MMHG (ref 35–45)
PCO2 BLD: 38.2 MMHG (ref 35–45)
PCO2 BLD: 39.3 MMHG (ref 35–45)
PCO2 BLD: 44.2 MMHG (ref 35–45)
PCO2 BLD: 44.9 MMHG (ref 35–45)
PCO2 BLDV: 37.6 MMHG (ref 41–51)
PEEP RESPIRATORY: 5 CMH2O
PH BLD: 7.33 (ref 7.35–7.45)
PH BLD: 7.33 (ref 7.35–7.45)
PH BLD: 7.4 (ref 7.35–7.45)
PH BLD: 7.4 (ref 7.35–7.45)
PH BLD: 7.42 (ref 7.35–7.45)
PH BLD: 7.42 (ref 7.35–7.45)
PH BLD: 7.43 (ref 7.35–7.45)
PH BLD: 7.43 (ref 7.35–7.45)
PH BLD: 7.49 (ref 7.35–7.45)
PH BLDV: 7.38 (ref 7.32–7.42)
PLATELET # BLD AUTO: 115 K/UL (ref 150–400)
PLATELET # BLD AUTO: 144 K/UL (ref 150–400)
PLATELET # BLD AUTO: 145 K/UL (ref 150–400)
PMV BLD AUTO: 10 FL (ref 8.9–12.9)
PMV BLD AUTO: 9.8 FL (ref 8.9–12.9)
PMV BLD AUTO: 9.9 FL (ref 8.9–12.9)
PO2 BLD: 127 MMHG (ref 80–100)
PO2 BLD: 134 MMHG (ref 80–100)
PO2 BLD: 368 MMHG (ref 80–100)
PO2 BLD: 495 MMHG (ref 80–100)
PO2 BLD: >515 MMHG (ref 80–100)
PO2 BLDV: 74 MMHG (ref 25–40)
POTASSIUM BLD-SCNC: 3.6 MMOL/L (ref 3.5–5.5)
POTASSIUM BLD-SCNC: 3.6 MMOL/L (ref 3.5–5.5)
POTASSIUM BLD-SCNC: 3.7 MMOL/L (ref 3.5–5.5)
POTASSIUM BLD-SCNC: 3.8 MMOL/L (ref 3.5–5.5)
POTASSIUM BLD-SCNC: 4.3 MMOL/L (ref 3.5–5.5)
POTASSIUM BLD-SCNC: 4.5 MMOL/L (ref 3.5–5.5)
POTASSIUM BLD-SCNC: 4.6 MMOL/L (ref 3.5–5.5)
POTASSIUM BLD-SCNC: 4.9 MMOL/L (ref 3.5–5.5)
POTASSIUM BLD-SCNC: 5 MMOL/L (ref 3.5–5.5)
POTASSIUM SERPL-SCNC: 3.5 MMOL/L (ref 3.5–5.1)
POTASSIUM SERPL-SCNC: 4.4 MMOL/L (ref 3.5–5.1)
PRESSURE SUPPORT SETTING VENT: 5 CMH2O
PROT SERPL-MCNC: 5.9 G/DL (ref 6.4–8.2)
PROT SERPL-MCNC: 6.6 G/DL (ref 6.4–8.2)
PROTHROMBIN TIME: 12.4 SEC (ref 9–11.1)
RBC # BLD AUTO: 3.13 M/UL (ref 4.1–5.7)
RBC # BLD AUTO: 3.56 M/UL (ref 4.1–5.7)
RBC # BLD AUTO: 4.41 M/UL (ref 4.1–5.7)
SAO2 % BLD: 100 %
SAO2 % BLD: 100 %
SAO2 % BLD: 95 %
SAO2 % BLD: 98.6 % (ref 92–97)
SAO2 % BLD: 99 %
SERVICE CMNT-IMP: ABNORMAL
SERVICE CMNT-IMP: NORMAL
SERVICE CMNT-IMP: NORMAL
SODIUM BLD-SCNC: 136 MMOL/L (ref 136–145)
SODIUM BLD-SCNC: 137 MMOL/L (ref 136–145)
SODIUM BLD-SCNC: 138 MMOL/L (ref 136–145)
SODIUM BLD-SCNC: 138 MMOL/L (ref 136–145)
SODIUM BLD-SCNC: 140 MMOL/L (ref 136–145)
SODIUM SERPL-SCNC: 138 MMOL/L (ref 136–145)
SODIUM SERPL-SCNC: 138 MMOL/L (ref 136–145)
SPECIMEN EXP DATE BLD: NORMAL
SPECIMEN SITE: ABNORMAL
SPECIMEN TYPE: ABNORMAL
THERAPEUTIC RANGE: NORMAL SECS (ref 58–77)
UNIT DIVISION: 0
UNIT DIVISION: 0
VENTILATION MODE VENT: ABNORMAL
WBC # BLD AUTO: 13.2 K/UL (ref 4.1–11.1)
WBC # BLD AUTO: 14.9 K/UL (ref 4.1–11.1)
WBC # BLD AUTO: 9.9 K/UL (ref 4.1–11.1)

## 2024-07-15 PROCEDURE — 6360000002 HC RX W HCPCS: Performed by: NURSE ANESTHETIST, CERTIFIED REGISTERED

## 2024-07-15 PROCEDURE — 33508 ENDOSCOPIC VEIN HARVEST: CPT | Performed by: PHYSICIAN ASSISTANT

## 2024-07-15 PROCEDURE — 82803 BLOOD GASES ANY COMBINATION: CPT

## 2024-07-15 PROCEDURE — 80053 COMPREHEN METABOLIC PANEL: CPT

## 2024-07-15 PROCEDURE — 85027 COMPLETE CBC AUTOMATED: CPT

## 2024-07-15 PROCEDURE — 3700000001 HC ADD 15 MINUTES (ANESTHESIA): Performed by: THORACIC SURGERY (CARDIOTHORACIC VASCULAR SURGERY)

## 2024-07-15 PROCEDURE — 2000000000 HC ICU R&B

## 2024-07-15 PROCEDURE — 6360000002 HC RX W HCPCS: Performed by: NURSE PRACTITIONER

## 2024-07-15 PROCEDURE — C1729 CATH, DRAINAGE: HCPCS | Performed by: THORACIC SURGERY (CARDIOTHORACIC VASCULAR SURGERY)

## 2024-07-15 PROCEDURE — 2580000003 HC RX 258: Performed by: NURSE ANESTHETIST, CERTIFIED REGISTERED

## 2024-07-15 PROCEDURE — 71045 X-RAY EXAM CHEST 1 VIEW: CPT

## 2024-07-15 PROCEDURE — 2720000010 HC SURG SUPPLY STERILE: Performed by: THORACIC SURGERY (CARDIOTHORACIC VASCULAR SURGERY)

## 2024-07-15 PROCEDURE — 30233R1 TRANSFUSION OF NONAUTOLOGOUS PLATELETS INTO PERIPHERAL VEIN, PERCUTANEOUS APPROACH: ICD-10-PCS | Performed by: HOSPITALIST

## 2024-07-15 PROCEDURE — C1768 GRAFT, VASCULAR: HCPCS | Performed by: THORACIC SURGERY (CARDIOTHORACIC VASCULAR SURGERY)

## 2024-07-15 PROCEDURE — 021109W BYPASS CORONARY ARTERY, TWO ARTERIES FROM AORTA WITH AUTOLOGOUS VENOUS TISSUE, OPEN APPROACH: ICD-10-PCS | Performed by: THORACIC SURGERY (CARDIOTHORACIC VASCULAR SURGERY)

## 2024-07-15 PROCEDURE — 3700000000 HC ANESTHESIA ATTENDED CARE: Performed by: THORACIC SURGERY (CARDIOTHORACIC VASCULAR SURGERY)

## 2024-07-15 PROCEDURE — 85018 HEMOGLOBIN: CPT

## 2024-07-15 PROCEDURE — 6360000002 HC RX W HCPCS: Performed by: PHYSICIAN ASSISTANT

## 2024-07-15 PROCEDURE — 3600000018 HC SURGERY OHS ADDTL 15MIN: Performed by: THORACIC SURGERY (CARDIOTHORACIC VASCULAR SURGERY)

## 2024-07-15 PROCEDURE — 2500000003 HC RX 250 WO HCPCS: Performed by: NURSE ANESTHETIST, CERTIFIED REGISTERED

## 2024-07-15 PROCEDURE — 02HV33Z INSERTION OF INFUSION DEVICE INTO SUPERIOR VENA CAVA, PERCUTANEOUS APPROACH: ICD-10-PCS | Performed by: ANESTHESIOLOGY

## 2024-07-15 PROCEDURE — 6360000002 HC RX W HCPCS: Performed by: THORACIC SURGERY (CARDIOTHORACIC VASCULAR SURGERY)

## 2024-07-15 PROCEDURE — 3600000008 HC SURGERY OHS BASE: Performed by: THORACIC SURGERY (CARDIOTHORACIC VASCULAR SURGERY)

## 2024-07-15 PROCEDURE — 6370000000 HC RX 637 (ALT 250 FOR IP): Performed by: PHYSICIAN ASSISTANT

## 2024-07-15 PROCEDURE — C1713 ANCHOR/SCREW BN/BN,TIS/BN: HCPCS | Performed by: THORACIC SURGERY (CARDIOTHORACIC VASCULAR SURGERY)

## 2024-07-15 PROCEDURE — 5A1221Z PERFORMANCE OF CARDIAC OUTPUT, CONTINUOUS: ICD-10-PCS | Performed by: THORACIC SURGERY (CARDIOTHORACIC VASCULAR SURGERY)

## 2024-07-15 PROCEDURE — 02100Z9 BYPASS CORONARY ARTERY, ONE ARTERY FROM LEFT INTERNAL MAMMARY, OPEN APPROACH: ICD-10-PCS | Performed by: THORACIC SURGERY (CARDIOTHORACIC VASCULAR SURGERY)

## 2024-07-15 PROCEDURE — 6370000000 HC RX 637 (ALT 250 FOR IP): Performed by: NURSE ANESTHETIST, CERTIFIED REGISTERED

## 2024-07-15 PROCEDURE — P9045 ALBUMIN (HUMAN), 5%, 250 ML: HCPCS | Performed by: PHYSICIAN ASSISTANT

## 2024-07-15 PROCEDURE — 2580000003 HC RX 258: Performed by: ANESTHESIOLOGY

## 2024-07-15 PROCEDURE — 06BP4ZZ EXCISION OF RIGHT SAPHENOUS VEIN, PERCUTANEOUS ENDOSCOPIC APPROACH: ICD-10-PCS | Performed by: THORACIC SURGERY (CARDIOTHORACIC VASCULAR SURGERY)

## 2024-07-15 PROCEDURE — 33518 CABG ARTERY-VEIN TWO: CPT | Performed by: PHYSICIAN ASSISTANT

## 2024-07-15 PROCEDURE — 2580000003 HC RX 258: Performed by: PHYSICIAN ASSISTANT

## 2024-07-15 PROCEDURE — 85730 THROMBOPLASTIN TIME PARTIAL: CPT

## 2024-07-15 PROCEDURE — 6360000002 HC RX W HCPCS: Performed by: ANESTHESIOLOGY

## 2024-07-15 PROCEDURE — 2700000000 HC OXYGEN THERAPY PER DAY

## 2024-07-15 PROCEDURE — 93005 ELECTROCARDIOGRAM TRACING: CPT | Performed by: PHYSICIAN ASSISTANT

## 2024-07-15 PROCEDURE — 84132 ASSAY OF SERUM POTASSIUM: CPT

## 2024-07-15 PROCEDURE — B24BZZ4 ULTRASONOGRAPHY OF HEART WITH AORTA, TRANSESOPHAGEAL: ICD-10-PCS | Performed by: THORACIC SURGERY (CARDIOTHORACIC VASCULAR SURGERY)

## 2024-07-15 PROCEDURE — 83605 ASSAY OF LACTIC ACID: CPT

## 2024-07-15 PROCEDURE — 33533 CABG ARTERIAL SINGLE: CPT | Performed by: PHYSICIAN ASSISTANT

## 2024-07-15 PROCEDURE — 30283B1 TRANSFUSION OF NONAUTOLOGOUS 4-FACTOR PROTHROMBIN COMPLEX CONCENTRATE INTO VEIN, PERCUTANEOUS APPROACH: ICD-10-PCS | Performed by: HOSPITALIST

## 2024-07-15 PROCEDURE — 2580000003 HC RX 258: Performed by: NURSE PRACTITIONER

## 2024-07-15 PROCEDURE — 36415 COLL VENOUS BLD VENIPUNCTURE: CPT

## 2024-07-15 PROCEDURE — 83735 ASSAY OF MAGNESIUM: CPT

## 2024-07-15 PROCEDURE — 85610 PROTHROMBIN TIME: CPT

## 2024-07-15 PROCEDURE — 99221 1ST HOSP IP/OBS SF/LOW 40: CPT | Performed by: CLINICAL NURSE SPECIALIST

## 2024-07-15 PROCEDURE — 6370000000 HC RX 637 (ALT 250 FOR IP): Performed by: NURSE PRACTITIONER

## 2024-07-15 PROCEDURE — 2709999900 HC NON-CHARGEABLE SUPPLY: Performed by: THORACIC SURGERY (CARDIOTHORACIC VASCULAR SURGERY)

## 2024-07-15 PROCEDURE — 82962 GLUCOSE BLOOD TEST: CPT

## 2024-07-15 PROCEDURE — 2500000003 HC RX 250 WO HCPCS: Performed by: PHYSICIAN ASSISTANT

## 2024-07-15 PROCEDURE — 82330 ASSAY OF CALCIUM: CPT

## 2024-07-15 PROCEDURE — 6370000000 HC RX 637 (ALT 250 FOR IP): Performed by: ANESTHESIOLOGY

## 2024-07-15 PROCEDURE — P9073 PLATELETS PHERESIS PATH REDU: HCPCS

## 2024-07-15 PROCEDURE — 84295 ASSAY OF SERUM SODIUM: CPT

## 2024-07-15 PROCEDURE — 82947 ASSAY GLUCOSE BLOOD QUANT: CPT

## 2024-07-15 RX ORDER — SUCCINYLCHOLINE/SOD CL,ISO/PF 200MG/10ML
SYRINGE (ML) INTRAVENOUS PRN
Status: DISCONTINUED | OUTPATIENT
Start: 2024-07-15 | End: 2024-07-15 | Stop reason: SDUPTHER

## 2024-07-15 RX ORDER — ONDANSETRON 2 MG/ML
4 INJECTION INTRAMUSCULAR; INTRAVENOUS AS NEEDED
Status: DISCONTINUED | OUTPATIENT
Start: 2024-07-15 | End: 2024-07-15 | Stop reason: HOSPADM

## 2024-07-15 RX ORDER — PROTAMINE SULFATE 10 MG/ML
INJECTION, SOLUTION INTRAVENOUS PRN
Status: DISCONTINUED | OUTPATIENT
Start: 2024-07-15 | End: 2024-07-15 | Stop reason: SDUPTHER

## 2024-07-15 RX ORDER — SODIUM CHLORIDE 0.9 % (FLUSH) 0.9 %
5-40 SYRINGE (ML) INJECTION PRN
Status: DISCONTINUED | OUTPATIENT
Start: 2024-07-15 | End: 2024-07-15 | Stop reason: HOSPADM

## 2024-07-15 RX ORDER — FENTANYL CITRATE 50 UG/ML
100 INJECTION, SOLUTION INTRAMUSCULAR; INTRAVENOUS
Status: DISCONTINUED | OUTPATIENT
Start: 2024-07-15 | End: 2024-07-15 | Stop reason: HOSPADM

## 2024-07-15 RX ORDER — FAMOTIDINE 20 MG/1
20 TABLET, FILM COATED ORAL DAILY
Status: DISCONTINUED | OUTPATIENT
Start: 2024-07-16 | End: 2024-07-19 | Stop reason: HOSPADM

## 2024-07-15 RX ORDER — SODIUM CHLORIDE 0.9 % (FLUSH) 0.9 %
5-40 SYRINGE (ML) INJECTION EVERY 12 HOURS SCHEDULED
Status: DISCONTINUED | OUTPATIENT
Start: 2024-07-15 | End: 2024-07-15 | Stop reason: HOSPADM

## 2024-07-15 RX ORDER — ACETAMINOPHEN 500 MG
1000 TABLET ORAL ONCE
Status: DISCONTINUED | OUTPATIENT
Start: 2024-07-15 | End: 2024-07-15 | Stop reason: HOSPADM

## 2024-07-15 RX ORDER — IPRATROPIUM BROMIDE AND ALBUTEROL SULFATE 2.5; .5 MG/3ML; MG/3ML
1 SOLUTION RESPIRATORY (INHALATION) EVERY 4 HOURS PRN
Status: DISCONTINUED | OUTPATIENT
Start: 2024-07-15 | End: 2024-07-19 | Stop reason: HOSPADM

## 2024-07-15 RX ORDER — FENTANYL CITRATE 50 UG/ML
100 INJECTION, SOLUTION INTRAMUSCULAR; INTRAVENOUS
Status: COMPLETED | OUTPATIENT
Start: 2024-07-15 | End: 2024-07-15

## 2024-07-15 RX ORDER — INSULIN LISPRO 100 [IU]/ML
1-20 INJECTION, SOLUTION INTRAVENOUS; SUBCUTANEOUS
Status: DISCONTINUED | OUTPATIENT
Start: 2024-07-15 | End: 2024-07-19

## 2024-07-15 RX ORDER — MIDAZOLAM HYDROCHLORIDE 2 MG/2ML
2 INJECTION, SOLUTION INTRAMUSCULAR; INTRAVENOUS
Status: DISCONTINUED | OUTPATIENT
Start: 2024-07-15 | End: 2024-07-15 | Stop reason: HOSPADM

## 2024-07-15 RX ORDER — SODIUM CHLORIDE 450 MG/100ML
INJECTION, SOLUTION INTRAVENOUS CONTINUOUS
Status: DISCONTINUED | OUTPATIENT
Start: 2024-07-15 | End: 2024-07-19

## 2024-07-15 RX ORDER — ONDANSETRON 2 MG/ML
4 INJECTION INTRAMUSCULAR; INTRAVENOUS ONCE
Status: DISCONTINUED | OUTPATIENT
Start: 2024-07-15 | End: 2024-07-15 | Stop reason: HOSPADM

## 2024-07-15 RX ORDER — ACETAMINOPHEN 500 MG
1000 TABLET ORAL ONCE
Status: COMPLETED | OUTPATIENT
Start: 2024-07-15 | End: 2024-07-15

## 2024-07-15 RX ORDER — FAMOTIDINE 20 MG/1
20 TABLET, FILM COATED ORAL 2 TIMES DAILY
Status: DISCONTINUED | OUTPATIENT
Start: 2024-07-15 | End: 2024-07-15

## 2024-07-15 RX ORDER — MIDAZOLAM HYDROCHLORIDE 2 MG/2ML
1 INJECTION, SOLUTION INTRAMUSCULAR; INTRAVENOUS
Status: DISCONTINUED | OUTPATIENT
Start: 2024-07-15 | End: 2024-07-16

## 2024-07-15 RX ORDER — SODIUM CHLORIDE 9 MG/ML
INJECTION, SOLUTION INTRAVENOUS CONTINUOUS
Status: DISCONTINUED | OUTPATIENT
Start: 2024-07-15 | End: 2024-07-19

## 2024-07-15 RX ORDER — ONDANSETRON 2 MG/ML
4 INJECTION INTRAMUSCULAR; INTRAVENOUS EVERY 4 HOURS PRN
Status: DISCONTINUED | OUTPATIENT
Start: 2024-07-15 | End: 2024-07-19 | Stop reason: HOSPADM

## 2024-07-15 RX ORDER — ONDANSETRON 2 MG/ML
4 INJECTION INTRAMUSCULAR; INTRAVENOUS ONCE
Status: DISCONTINUED | OUTPATIENT
Start: 2024-07-15 | End: 2024-07-19

## 2024-07-15 RX ORDER — POLYETHYLENE GLYCOL 3350 17 G/17G
17 POWDER, FOR SOLUTION ORAL DAILY
Status: DISCONTINUED | OUTPATIENT
Start: 2024-07-15 | End: 2024-07-19 | Stop reason: HOSPADM

## 2024-07-15 RX ORDER — SODIUM CHLORIDE 9 MG/ML
INJECTION, SOLUTION INTRAVENOUS PRN
Status: DISCONTINUED | OUTPATIENT
Start: 2024-07-15 | End: 2024-07-15 | Stop reason: HOSPADM

## 2024-07-15 RX ORDER — ACETAMINOPHEN 325 MG/1
975 TABLET ORAL EVERY 6 HOURS SCHEDULED
Status: DISCONTINUED | OUTPATIENT
Start: 2024-07-15 | End: 2024-07-19 | Stop reason: HOSPADM

## 2024-07-15 RX ORDER — SODIUM CHLORIDE 0.9 % (FLUSH) 0.9 %
5-40 SYRINGE (ML) INJECTION PRN
Status: DISCONTINUED | OUTPATIENT
Start: 2024-07-15 | End: 2024-07-19 | Stop reason: HOSPADM

## 2024-07-15 RX ORDER — OXYCODONE HYDROCHLORIDE 5 MG/1
5 TABLET ORAL EVERY 4 HOURS PRN
Status: DISCONTINUED | OUTPATIENT
Start: 2024-07-15 | End: 2024-07-16

## 2024-07-15 RX ORDER — MIDAZOLAM HYDROCHLORIDE 2 MG/2ML
2 INJECTION, SOLUTION INTRAMUSCULAR; INTRAVENOUS
Status: COMPLETED | OUTPATIENT
Start: 2024-07-15 | End: 2024-07-15

## 2024-07-15 RX ORDER — HEPARIN SODIUM 10000 [USP'U]/ML
INJECTION, SOLUTION INTRAVENOUS; SUBCUTANEOUS PRN
Status: DISCONTINUED | OUTPATIENT
Start: 2024-07-15 | End: 2024-07-15 | Stop reason: ALTCHOICE

## 2024-07-15 RX ORDER — DEXMEDETOMIDINE HYDROCHLORIDE 4 UG/ML
INJECTION, SOLUTION INTRAVENOUS CONTINUOUS PRN
Status: DISCONTINUED | OUTPATIENT
Start: 2024-07-15 | End: 2024-07-15 | Stop reason: SDUPTHER

## 2024-07-15 RX ORDER — PHENYLEPHRINE HCL IN 0.9% NACL 0.4MG/10ML
SYRINGE (ML) INTRAVENOUS PRN
Status: DISCONTINUED | OUTPATIENT
Start: 2024-07-15 | End: 2024-07-15 | Stop reason: SDUPTHER

## 2024-07-15 RX ORDER — MAGNESIUM SULFATE IN WATER 40 MG/ML
2000 INJECTION, SOLUTION INTRAVENOUS PRN
Status: DISCONTINUED | OUTPATIENT
Start: 2024-07-15 | End: 2024-07-19 | Stop reason: HOSPADM

## 2024-07-15 RX ORDER — OXYCODONE HYDROCHLORIDE 5 MG/1
10 TABLET ORAL EVERY 4 HOURS PRN
Status: DISCONTINUED | OUTPATIENT
Start: 2024-07-15 | End: 2024-07-16

## 2024-07-15 RX ORDER — LIDOCAINE HYDROCHLORIDE 20 MG/ML
INJECTION, SOLUTION EPIDURAL; INFILTRATION; INTRACAUDAL; PERINEURAL PRN
Status: DISCONTINUED | OUTPATIENT
Start: 2024-07-15 | End: 2024-07-15 | Stop reason: SDUPTHER

## 2024-07-15 RX ORDER — SENNA AND DOCUSATE SODIUM 50; 8.6 MG/1; MG/1
1 TABLET, FILM COATED ORAL 2 TIMES DAILY
Status: DISCONTINUED | OUTPATIENT
Start: 2024-07-15 | End: 2024-07-19 | Stop reason: HOSPADM

## 2024-07-15 RX ORDER — ASPIRIN 81 MG/1
81 TABLET ORAL DAILY
Status: DISCONTINUED | OUTPATIENT
Start: 2024-07-16 | End: 2024-07-19 | Stop reason: HOSPADM

## 2024-07-15 RX ORDER — ROCURONIUM BROMIDE 10 MG/ML
INJECTION, SOLUTION INTRAVENOUS PRN
Status: DISCONTINUED | OUTPATIENT
Start: 2024-07-15 | End: 2024-07-15 | Stop reason: SDUPTHER

## 2024-07-15 RX ORDER — GABAPENTIN 100 MG/1
200 CAPSULE ORAL 3 TIMES DAILY
Status: DISCONTINUED | OUTPATIENT
Start: 2024-07-15 | End: 2024-07-16

## 2024-07-15 RX ORDER — HYDRALAZINE HYDROCHLORIDE 20 MG/ML
10 INJECTION INTRAMUSCULAR; INTRAVENOUS EVERY 6 HOURS PRN
Status: DISCONTINUED | OUTPATIENT
Start: 2024-07-15 | End: 2024-07-19 | Stop reason: HOSPADM

## 2024-07-15 RX ORDER — LIDOCAINE 4 G/G
2 PATCH TOPICAL DAILY
Status: DISCONTINUED | OUTPATIENT
Start: 2024-07-15 | End: 2024-07-19 | Stop reason: HOSPADM

## 2024-07-15 RX ORDER — HEPARIN SODIUM 1000 [USP'U]/ML
INJECTION, SOLUTION INTRAVENOUS; SUBCUTANEOUS PRN
Status: DISCONTINUED | OUTPATIENT
Start: 2024-07-15 | End: 2024-07-15 | Stop reason: SDUPTHER

## 2024-07-15 RX ORDER — PROPOFOL 10 MG/ML
INJECTION, EMULSION INTRAVENOUS PRN
Status: DISCONTINUED | OUTPATIENT
Start: 2024-07-15 | End: 2024-07-15 | Stop reason: SDUPTHER

## 2024-07-15 RX ORDER — INSULIN LISPRO 100 [IU]/ML
0-6 INJECTION, SOLUTION INTRAVENOUS; SUBCUTANEOUS NIGHTLY
Status: DISCONTINUED | OUTPATIENT
Start: 2024-07-17 | End: 2024-07-19 | Stop reason: HOSPADM

## 2024-07-15 RX ORDER — GABAPENTIN 300 MG/1
300 CAPSULE ORAL ONCE
Status: COMPLETED | OUTPATIENT
Start: 2024-07-15 | End: 2024-07-15

## 2024-07-15 RX ORDER — DESMOPRESSIN ACETATE 4 UG/ML
INJECTION, SOLUTION INTRAVENOUS; SUBCUTANEOUS PRN
Status: DISCONTINUED | OUTPATIENT
Start: 2024-07-15 | End: 2024-07-15 | Stop reason: SDUPTHER

## 2024-07-15 RX ORDER — PAPAVERINE HYDROCHLORIDE 30 MG/ML
INJECTION INTRAMUSCULAR; INTRAVENOUS PRN
Status: DISCONTINUED | OUTPATIENT
Start: 2024-07-15 | End: 2024-07-15 | Stop reason: ALTCHOICE

## 2024-07-15 RX ORDER — SODIUM CHLORIDE 9 MG/ML
INJECTION, SOLUTION INTRAVENOUS CONTINUOUS
Status: DISCONTINUED | OUTPATIENT
Start: 2024-07-15 | End: 2024-07-15 | Stop reason: HOSPADM

## 2024-07-15 RX ORDER — INSULIN GLARGINE 100 [IU]/ML
1-50 INJECTION, SOLUTION SUBCUTANEOUS
Status: COMPLETED | OUTPATIENT
Start: 2024-07-17 | End: 2024-07-17

## 2024-07-15 RX ORDER — LANOLIN ALCOHOL/MO/W.PET/CERES
400 CREAM (GRAM) TOPICAL 2 TIMES DAILY
Status: DISCONTINUED | OUTPATIENT
Start: 2024-07-16 | End: 2024-07-19 | Stop reason: HOSPADM

## 2024-07-15 RX ORDER — LIDOCAINE HYDROCHLORIDE 10 MG/ML
1 INJECTION, SOLUTION EPIDURAL; INFILTRATION; INTRACAUDAL; PERINEURAL
Status: DISCONTINUED | OUTPATIENT
Start: 2024-07-15 | End: 2024-07-15 | Stop reason: HOSPADM

## 2024-07-15 RX ORDER — DIPHENHYDRAMINE HCL 25 MG
25 CAPSULE ORAL NIGHTLY PRN
Status: DISCONTINUED | OUTPATIENT
Start: 2024-07-16 | End: 2024-07-19 | Stop reason: HOSPADM

## 2024-07-15 RX ORDER — HYDROMORPHONE HYDROCHLORIDE 1 MG/ML
0.5 INJECTION, SOLUTION INTRAMUSCULAR; INTRAVENOUS; SUBCUTANEOUS EVERY 4 HOURS PRN
Status: DISCONTINUED | OUTPATIENT
Start: 2024-07-15 | End: 2024-07-16

## 2024-07-15 RX ORDER — CEFAZOLIN SODIUM 1 G/3ML
INJECTION, POWDER, FOR SOLUTION INTRAMUSCULAR; INTRAVENOUS PRN
Status: DISCONTINUED | OUTPATIENT
Start: 2024-07-15 | End: 2024-07-15 | Stop reason: ALTCHOICE

## 2024-07-15 RX ORDER — SODIUM CHLORIDE 0.9 % (FLUSH) 0.9 %
5-40 SYRINGE (ML) INJECTION EVERY 12 HOURS SCHEDULED
Status: DISCONTINUED | OUTPATIENT
Start: 2024-07-15 | End: 2024-07-19 | Stop reason: HOSPADM

## 2024-07-15 RX ORDER — ROPIVACAINE HYDROCHLORIDE 5 MG/ML
INJECTION, SOLUTION EPIDURAL; INFILTRATION; PERINEURAL PRN
Status: DISCONTINUED | OUTPATIENT
Start: 2024-07-15 | End: 2024-07-15 | Stop reason: ALTCHOICE

## 2024-07-15 RX ORDER — POTASSIUM CHLORIDE 29.8 MG/ML
20 INJECTION INTRAVENOUS PRN
Status: DISCONTINUED | OUTPATIENT
Start: 2024-07-15 | End: 2024-07-19

## 2024-07-15 RX ORDER — DEXTROSE MONOHYDRATE 100 MG/ML
INJECTION, SOLUTION INTRAVENOUS CONTINUOUS PRN
Status: DISCONTINUED | OUTPATIENT
Start: 2024-07-15 | End: 2024-07-19 | Stop reason: HOSPADM

## 2024-07-15 RX ORDER — FENTANYL CITRATE 50 UG/ML
INJECTION, SOLUTION INTRAMUSCULAR; INTRAVENOUS PRN
Status: DISCONTINUED | OUTPATIENT
Start: 2024-07-15 | End: 2024-07-15 | Stop reason: SDUPTHER

## 2024-07-15 RX ORDER — SODIUM CHLORIDE, SODIUM LACTATE, POTASSIUM CHLORIDE, CALCIUM CHLORIDE 600; 310; 30; 20 MG/100ML; MG/100ML; MG/100ML; MG/100ML
INJECTION, SOLUTION INTRAVENOUS CONTINUOUS
Status: DISCONTINUED | OUTPATIENT
Start: 2024-07-15 | End: 2024-07-15 | Stop reason: HOSPADM

## 2024-07-15 RX ORDER — BISACODYL 10 MG
10 SUPPOSITORY, RECTAL RECTAL DAILY PRN
Status: DISCONTINUED | OUTPATIENT
Start: 2024-07-15 | End: 2024-07-19 | Stop reason: HOSPADM

## 2024-07-15 RX ORDER — CHLORHEXIDINE GLUCONATE ORAL RINSE 1.2 MG/ML
15 SOLUTION DENTAL 2 TIMES DAILY
Status: DISCONTINUED | OUTPATIENT
Start: 2024-07-15 | End: 2024-07-19 | Stop reason: HOSPADM

## 2024-07-15 RX ORDER — ALBUMIN, HUMAN INJ 5% 5 %
12.5 SOLUTION INTRAVENOUS PRN
Status: COMPLETED | OUTPATIENT
Start: 2024-07-15 | End: 2024-07-15

## 2024-07-15 RX ORDER — INSULIN LISPRO 100 [IU]/ML
0-12 INJECTION, SOLUTION INTRAVENOUS; SUBCUTANEOUS
Status: DISCONTINUED | OUTPATIENT
Start: 2024-07-17 | End: 2024-07-19 | Stop reason: HOSPADM

## 2024-07-15 RX ORDER — SODIUM CHLORIDE 9 MG/ML
INJECTION, SOLUTION INTRAVENOUS PRN
Status: DISCONTINUED | OUTPATIENT
Start: 2024-07-15 | End: 2024-07-15

## 2024-07-15 RX ORDER — DEXMEDETOMIDINE HYDROCHLORIDE 4 UG/ML
.1-1.5 INJECTION, SOLUTION INTRAVENOUS CONTINUOUS
Status: DISCONTINUED | OUTPATIENT
Start: 2024-07-15 | End: 2024-07-16

## 2024-07-15 RX ADMIN — FENTANYL CITRATE 200 MCG: 50 INJECTION, SOLUTION INTRAMUSCULAR; INTRAVENOUS at 07:43

## 2024-07-15 RX ADMIN — FENTANYL CITRATE 50 MCG: 50 INJECTION, SOLUTION INTRAMUSCULAR; INTRAVENOUS at 09:34

## 2024-07-15 RX ADMIN — FENTANYL CITRATE 50 MCG: 50 INJECTION, SOLUTION INTRAMUSCULAR; INTRAVENOUS at 12:02

## 2024-07-15 RX ADMIN — PHENYLEPHRINE HYDROCHLORIDE 40 MCG/MIN: 10 INJECTION INTRAVENOUS at 07:43

## 2024-07-15 RX ADMIN — PROTAMINE SULFATE 350 MG: 10 INJECTION, SOLUTION INTRAVENOUS at 11:16

## 2024-07-15 RX ADMIN — Medication 120 MCG: at 07:43

## 2024-07-15 RX ADMIN — LIDOCAINE HYDROCHLORIDE 100 MG: 20 INJECTION, SOLUTION EPIDURAL; INFILTRATION; INTRACAUDAL; PERINEURAL at 07:43

## 2024-07-15 RX ADMIN — SODIUM CHLORIDE, PRESERVATIVE FREE 10 ML: 5 INJECTION INTRAVENOUS at 20:18

## 2024-07-15 RX ADMIN — SODIUM CHLORIDE: 4.5 INJECTION, SOLUTION INTRAVENOUS at 15:22

## 2024-07-15 RX ADMIN — OXYCODONE 5 MG: 5 TABLET ORAL at 21:23

## 2024-07-15 RX ADMIN — ALBUMIN (HUMAN) 12.5 G: 12.5 INJECTION, SOLUTION INTRAVENOUS at 14:31

## 2024-07-15 RX ADMIN — PROPOFOL 20 MG: 10 INJECTION, EMULSION INTRAVENOUS at 09:34

## 2024-07-15 RX ADMIN — FENTANYL CITRATE 50 MCG: 50 INJECTION, SOLUTION INTRAMUSCULAR; INTRAVENOUS at 12:08

## 2024-07-15 RX ADMIN — POTASSIUM CHLORIDE 20 MEQ: 29.8 INJECTION, SOLUTION INTRAVENOUS at 15:39

## 2024-07-15 RX ADMIN — SUGAMMADEX 125 MG: 100 INJECTION, SOLUTION INTRAVENOUS at 13:43

## 2024-07-15 RX ADMIN — HYDROMORPHONE HYDROCHLORIDE 0.5 MG: 1 INJECTION, SOLUTION INTRAMUSCULAR; INTRAVENOUS; SUBCUTANEOUS at 12:49

## 2024-07-15 RX ADMIN — SODIUM CHLORIDE 10 ML/HR: 4.5 INJECTION, SOLUTION INTRAVENOUS at 16:42

## 2024-07-15 RX ADMIN — PROPOFOL 80 MG: 10 INJECTION, EMULSION INTRAVENOUS at 07:43

## 2024-07-15 RX ADMIN — ALBUMIN (HUMAN) 12.5 G: 12.5 INJECTION, SOLUTION INTRAVENOUS at 13:45

## 2024-07-15 RX ADMIN — PHENYLEPHRINE HYDROCHLORIDE 20 MCG/MIN: 10 INJECTION INTRAVENOUS at 13:40

## 2024-07-15 RX ADMIN — DESMOPRESSIN ACETATE 18.9 MCG: 4 INJECTION, SOLUTION INTRAVENOUS; SUBCUTANEOUS at 11:25

## 2024-07-15 RX ADMIN — DEXMEDETOMIDINE HYDROCHLORIDE 0.3 MCG/KG/HR: 400 INJECTION, SOLUTION INTRAVENOUS at 15:17

## 2024-07-15 RX ADMIN — ROCURONIUM BROMIDE 40 MG: 10 INJECTION, SOLUTION INTRAVENOUS at 09:38

## 2024-07-15 RX ADMIN — ROCURONIUM BROMIDE 20 MG: 10 INJECTION, SOLUTION INTRAVENOUS at 11:49

## 2024-07-15 RX ADMIN — HEPARIN SODIUM 20000 UNITS: 1000 INJECTION INTRAVENOUS; SUBCUTANEOUS at 08:59

## 2024-07-15 RX ADMIN — GABAPENTIN 300 MG: 300 CAPSULE ORAL at 06:46

## 2024-07-15 RX ADMIN — PROPOFOL 50 MG: 10 INJECTION, EMULSION INTRAVENOUS at 11:11

## 2024-07-15 RX ADMIN — HYDROMORPHONE HYDROCHLORIDE 0.5 MG: 1 INJECTION, SOLUTION INTRAMUSCULAR; INTRAVENOUS; SUBCUTANEOUS at 12:44

## 2024-07-15 RX ADMIN — PHENYLEPHRINE HYDROCHLORIDE 40 MCG/MIN: 10 INJECTION INTRAVENOUS at 08:04

## 2024-07-15 RX ADMIN — FENTANYL CITRATE 25 MCG: 0.05 INJECTION, SOLUTION INTRAMUSCULAR; INTRAVENOUS at 07:08

## 2024-07-15 RX ADMIN — SODIUM CHLORIDE 0.69 UNITS/HR: 9 INJECTION, SOLUTION INTRAVENOUS at 10:51

## 2024-07-15 RX ADMIN — WATER 2000 MG: 1 INJECTION INTRAMUSCULAR; INTRAVENOUS; SUBCUTANEOUS at 19:49

## 2024-07-15 RX ADMIN — GABAPENTIN 200 MG: 100 CAPSULE ORAL at 21:23

## 2024-07-15 RX ADMIN — MUPIROCIN: 20 OINTMENT TOPICAL at 20:13

## 2024-07-15 RX ADMIN — HEPARIN SODIUM 1000 UNITS: 1000 INJECTION INTRAVENOUS; SUBCUTANEOUS at 08:10

## 2024-07-15 RX ADMIN — DEXMEDETOMIDINE HYDROCHLORIDE 0.8 MCG/KG/HR: 400 INJECTION, SOLUTION INTRAVENOUS at 11:10

## 2024-07-15 RX ADMIN — SODIUM CHLORIDE, POTASSIUM CHLORIDE, SODIUM LACTATE AND CALCIUM CHLORIDE: 600; 310; 30; 20 INJECTION, SOLUTION INTRAVENOUS at 06:55

## 2024-07-15 RX ADMIN — AMINOCAPROIC ACID 10 G/HR: 250 INJECTION, SOLUTION INTRAVENOUS at 08:02

## 2024-07-15 RX ADMIN — POTASSIUM CHLORIDE 20 MEQ: 29.8 INJECTION, SOLUTION INTRAVENOUS at 16:43

## 2024-07-15 RX ADMIN — Medication 80 MCG: at 08:49

## 2024-07-15 RX ADMIN — FENTANYL CITRATE 50 MCG: 50 INJECTION, SOLUTION INTRAMUSCULAR; INTRAVENOUS at 08:52

## 2024-07-15 RX ADMIN — SODIUM CHLORIDE 25 ML/HR: 4.5 INJECTION, SOLUTION INTRAVENOUS at 15:20

## 2024-07-15 RX ADMIN — WATER 2000 MG: 1 INJECTION INTRAMUSCULAR; INTRAVENOUS; SUBCUTANEOUS at 08:00

## 2024-07-15 RX ADMIN — PROPOFOL 50 MG: 10 INJECTION, EMULSION INTRAVENOUS at 12:07

## 2024-07-15 RX ADMIN — ACETAMINOPHEN 1000 MG: 500 TABLET ORAL at 06:44

## 2024-07-15 RX ADMIN — WATER 2000 MG: 1 INJECTION INTRAMUSCULAR; INTRAVENOUS; SUBCUTANEOUS at 11:00

## 2024-07-15 RX ADMIN — PROTHROMBIN, COAGULATION FACTOR VII HUMAN, COAGULATION FACTOR IX HUMAN, COAGULATION FACTOR X HUMAN, PROTEIN C, PROTEIN S HUMAN, AND WATER 1000 UNITS: KIT at 13:21

## 2024-07-15 RX ADMIN — DEXMEDETOMIDINE HYDROCHLORIDE 0.3 MCG/KG/HR: 400 INJECTION, SOLUTION INTRAVENOUS at 14:29

## 2024-07-15 RX ADMIN — Medication 140 MG: at 07:43

## 2024-07-15 RX ADMIN — FENTANYL CITRATE 50 MCG: 50 INJECTION, SOLUTION INTRAMUSCULAR; INTRAVENOUS at 08:22

## 2024-07-15 RX ADMIN — ROCURONIUM BROMIDE 10 MG: 10 INJECTION, SOLUTION INTRAVENOUS at 07:43

## 2024-07-15 RX ADMIN — MIDAZOLAM 2 MG: 1 INJECTION INTRAMUSCULAR; INTRAVENOUS at 07:05

## 2024-07-15 RX ADMIN — ALBUMIN (HUMAN) 12.5 G: 12.5 INJECTION, SOLUTION INTRAVENOUS at 14:12

## 2024-07-15 RX ADMIN — FENTANYL CITRATE 50 MCG: 50 INJECTION, SOLUTION INTRAMUSCULAR; INTRAVENOUS at 08:14

## 2024-07-15 RX ADMIN — CHLORHEXIDINE GLUCONATE 15 ML: 1.2 RINSE ORAL at 20:50

## 2024-07-15 RX ADMIN — ROCURONIUM BROMIDE 50 MG: 10 INJECTION, SOLUTION INTRAVENOUS at 07:49

## 2024-07-15 ASSESSMENT — PAIN - FUNCTIONAL ASSESSMENT: PAIN_FUNCTIONAL_ASSESSMENT: 0-10

## 2024-07-15 ASSESSMENT — PULMONARY FUNCTION TESTS: PIF_VALUE: 18

## 2024-07-15 ASSESSMENT — PAIN SCALES - GENERAL
PAINLEVEL_OUTOF10: 0
PAINLEVEL_OUTOF10: 5
PAINLEVEL_OUTOF10: 3
PAINLEVEL_OUTOF10: 0
PAINLEVEL_OUTOF10: 3
PAINLEVEL_OUTOF10: 3
PAINLEVEL_OUTOF10: 0

## 2024-07-15 ASSESSMENT — PAIN DESCRIPTION - LOCATION
LOCATION: CHEST
LOCATION: CHEST

## 2024-07-15 NOTE — ANESTHESIA POSTPROCEDURE EVALUATION
Department of Anesthesiology  Postprocedure Note    Patient: Matthew Puente  MRN: [id number]  YOB: 1940  Date of evaluation: 7/15/2024    Procedure Summary       Date: 07/15/24 Room / Location: [facility] HEART OR [address] [place] [address] OPEN HEART    Anesthesia Start: 0725 Anesthesia Stop: 1350    Procedure: CORONARY ARTERY BYPASS GRAFT x3, CPB, RIGHT LEG SVEVH, LIMA, VICTORINA AND EPI AORTIC U/S BY DR BROWN (Chest) Diagnosis:       Coronary artery disease involving native heart, unspecified vessel or lesion type, unspecified whether angina present      (Coronary artery disease involving native heart, unspecified vessel or lesion type, unspecified whether angina present [I25.10])    Providers: Shay Church MD Responsible Provider: Shaheen Brown MD    Anesthesia Type: General ASA Status: 3            Anesthesia Type: General    Bill Phase I: Bill Score: 10    Bill Phase II:      Anesthesia Post Evaluation    Patient location during evaluation: PACU  Patient participation: complete - patient participated  Level of consciousness: responsive to verbal stimuli and sleepy but conscious  Pain score: 2  Airway patency: patent  Cardiovascular status: blood pressure returned to baseline  Respiratory status: acceptable  Hydration status: stable  Comments: +Post-Anesthesia Evaluation and Assessment    Patient: Matthew Puente MRN: [id number]  SSN: [id number]   YOB: 1940  Age: 83 y.o.  Sex: male          Cardiovascular Function/Vital Signs    /64   Pulse 70   Temp 97.3 °F (36.3 °C) (Bladder)   Resp 14   Ht 1.588 m (5' 2.52\")   Wt 62.7 kg (138 lb 3.7 oz)   SpO2 100%   BMI 24.86 kg/m²     Patient is status post Procedure(s):  CORONARY ARTERY BYPASS GRAFT x3, CPB, RIGHT LEG SVEVH, LIMA, VICTORINA AND EPI AORTIC U/S BY DR BROWN.    Nausea/Vomiting: Controlled.    Postoperative hydration reviewed and adequate.    Pain:      Managed.    Neurological Status:       At baseline.    Mental Status and Level of

## 2024-07-15 NOTE — PROGRESS NOTES
Occupational Therapy  07/15/24    Chart reviewed, referral received. Pt is POD 0 CABG x 3. Will hold today and follow up tomorrow pending medical stability. Thank you. Freya Boogie, OT

## 2024-07-15 NOTE — PERIOP NOTE
2 GRAMS ANCEF GIVEN AT 0800 ON 7/15/24    BETABLOCKER NOT INDICATED SECONDARY TO BRADYCARDIA     PROVIDER:[TOKEN:[[id number]],FOLLOWUP:[Routine]],PROVIDER:[TOKEN:[[id number]],FOLLOWUP:[Routine]]

## 2024-07-15 NOTE — PROGRESS NOTES
1340: TRANSFER - IN REPORT:    Verbal report received from Slim San CRNA  on Matthew [facility]  being received from  for routine post-op  CSOR    Report consisted of patient's Situation, Background, Assessment and   Recommendations(SBAR).     Information from the following report(s) Adult Overview, Surgery Report, Intake/Output, MAR, Recent Results, Med Rec Status, and Cardiac Rhythm atrial pacing   was reviewed with the receiving nurse.    Opportunity for questions and clarification was provided.      Assessment completed upon patient's arrival to unit and care assumed.  Pt reversed per CRNA sukhi weaned quickly volume hung,     1430 Pt's BP very labile albumin hung.sukhi on and off.      1503: 3 albumins hung total  Precedex off    1629: Pt placed on half rate and 40% Nodded his head slightly squeezed with both hands     1653 Sukhi back on     1703 : Precedex on at 0.1 mcg     1750 Pt very sleepy Dex off ABG's great. Extubated to 4 L/min NC very sleepy but follows commands then goes right back to sleep.   1756 precedex off.    1910: Pt's family here and pt awoke some and barely interacted but does follow commands and goes to sleep again.      1930: Bedside and Verbal shift change report given to Audrey GRULLON (oncoming nurse) by TRINA RAZA RN (offgoing nurse). Report included the following information Adult Overview, Surgery Report, Intake/Output, MAR, Recent Results, Med Rec Status, and Cardiac Rhythm NSR .

## 2024-07-15 NOTE — BRIEF OP NOTE
BRIEF OP NOTE  Pre-Op Diagnosis: Coronary artery disease involving native heart, unspecified vessel or lesion type, unspecified whether angina present [I25.10]    Post-Op Diagnosis: Same    Procedure:  Procedure(s):  CORONARY ARTERY BYPASS GRAFT x3, CPB, RIGHT LEG SVEVH, LIMA-LAD, SVG-FELECIA and left marginal branch, VICTORINA AND EPI AORTIC U/S BY DR GOMEZ       Surgeon: Shay French MD    Assistant(s): Gege Dejesus PA-C    Anesthesia: General     Infusions: Sukhi, precedex, insulin,     Estimated Blood Loss: 300cc    Cell Saver: 470cc    XC: 76 Minutes    CPB: 94 Minutes    Specimens: * No specimens in log *    Drains and pacing wires: 2 atrial wires, 1 bipolar ventricular wire, 3 erasto drains    Complications: None    Findings: See full operative note.    Implants: * No implants in log *

## 2024-07-15 NOTE — CONSULTS
CRITICAL CARE NOTE      Name: Matthew Puente   : 1940   MRN: [id number]   Date: 7/15/2024      Reason for ICU Admission: s/p CABG    ICU PROBLEM LIST   Multivessel CAD s/p CABG x3  NSTEMI  HFpEF  CKD 2  Glaucoma    HISTORY OF PRESENT ILLNESS:   Pt is a 82 yo M w/ PMH as noted above who presented to ED  for CP and was diagnosed w/ NSTEMI and LHC showing multivessel CAD. Pt now presents to CVICU post CABG x3 (LIMA-LAD, SVG-L marginal, SVG-FELECIA)    VICTORINA LVEF 45-50%, mild MR, mild AI     CPB 94 min     EBL 300ml, w/ 470ml Cell saver  Received DDAVP and unit PLTs       CXR w/ expected post sternotomy changes, supportive lines in place      2 atrial wires, 1 bipolar ventricular wire, 3 sarmad drains       24 HOUR EVENTS:   Pt arrives to CVICU in AAI pacing to 70, intubated, sedated on precedex, phenylephrine, insulin gtt     NEUROLOGICAL:    On Precedex, wean  Monitor mentation as awakens off sedation  As needed pain regimen     PULMONOLOGY:   Lung protective mechanical ventilation  Goal extubation postop day 0  As needed nebs  IS, PFV, out of bed to chair as tolerated once extubated     CARDIOVASCULAR:   Wean phenylephrine as tolerated   Monitor underlying rhythm   Amiodarone per protocol  Goal MAP greater than 65, SBP less than 130, CI >2, CO >3.5  Pacer wires and Sarmad drain management per CT surgery   Telemetry  ASA/Statin     GASTROINTESTINAL:   Pepcid  ADAT once extubated     RENAL/ELECTROLYTE/FLUIDS:   Strict ins and outs  Daily renal panel  Monitor and replace electrolytes     ENDOCRINE:   Insulin drip per protocol  Glucose goal 120-180     HEMATOLOGY/ONCOLOGY:   SCDs  Chemical prophylaxis as cleared by surgery     ID/MICRO:      Perioperative Ancef      ICU DAILY CHECKLIST      Code Status: Full  DVT Prophylaxis: SCDs  T/L/D: ETT, CVC, A-line, Sarmad x3, Yost   SUP: Pepcid  Diet: ADA T  Activity Level: Ad agustin.  ABCDEF Bundle/Checklist Completed:Yes  Disposition: Stay in ICU  Multidisciplinary Rounds

## 2024-07-15 NOTE — PROGRESS NOTES
Cardiac Surgery Coordinator- Met with the family of Matthew Puente, reviewed role of the Cardiac Surgery Care Coordinator.  Reviewed plan of care and day of surgery expectations. Provided family with an update from OR.  Encouraged family to verbalize and emotional support given.  Will continue to update throughout the day.    1115- Cardiac Surgery Coordinator- Met with the family of Matthew Puente,  Provided family with an update from OR.  Will continue to update throughout the day.    1315- Met with Matthew Puente's family and Dr. Church.  Update given, Encouraged family to verbalize and offered emotional support.  Reinforced Surgical waiting room instructions, family to wait in the main surgical waiting room until contacted by the nursing staff.    1500- Escorted Mr Yeung's family to the bedside, reviewed post op plan of care. Exchanged contact information and encouraged them to verbalize. Family will be leaving for lunch. Will continue to follow.

## 2024-07-15 NOTE — ANESTHESIA PROCEDURE NOTES
Central Venous Line:    A central venous line was placed using ultrasound guidance and surface landmarks, in the pre-op for the following indication(s): central venous access and CVP monitoring.7/15/2024 7:12 AM7/15/2024 7:24 AM    Sterility preparation included the following: provider used sterile gloves, gown, hat and mask and maximum sterile barriers used during central venous catheter insertion.    The patient was placed in Trendelenburg position.The right internal jugular vein was prepped.    The site was prepped with Chloraprep.  A 9 Fr (size), 12 (length), introducer double lumen was placed.    During the procedure, the following specific steps were taken: target vein identified, needle advanced into vein and blood aspirated and guidewire advanced into vein.    Intravenous verification was obtained by ultrasound, venous blood return and manometry.    Post insertion care included: all ports aspirated, all ports flushed easily, guidewire removed intact, Biopatch applied, line sutured in place and dressing applied.    During the procedure the patient experienced: patient tolerated procedure well with no complications.      Outcomes: uncomplicated  Anesthesia type: localA(n) oximetric, 8 (size) Pulmonary Artery Catheter (PAC) was placed through the Introducer CVL in the right internal jugular vein.  The PAC placement was confirmed by pressure tracing changes and VICTORINA.  The patient experienced the following events during the procedure: patient tolerated procedure well with no complications.PA Cath placed?: Yes  Staffing  Performed: Anesthesiologist   Anesthesiologist: Shaheen Brown MD  Performed by: Shaheen Brown MD  Authorized by: Shaheen Brown MD    Preanesthetic Checklist  Completed: patient identified, IV checked, site marked, risks and benefits discussed, surgical/procedural consents, equipment checked, pre-op evaluation, timeout performed, anesthesia consent given, oxygen available, monitors applied/VS

## 2024-07-15 NOTE — OP NOTE
Coronary Artery Bypass Graft Procedure Note      Pre-operative Diagnosis:                                              Disease of the native coronary arteries                                              Non STEMI                                             Severe CAD    Post-operative Diagnosis: same    Surgeon: Shay Church MD    Assistant: MADDI Rice    Anesthesia: General endotracheal anesthesia    EBL:  See perfusion record    Specimen none    Implants none    Complications none        Operation: Coronary Bypass Grafting Procedure(s):  CORONARY ARTERY BYPASS GRAFT x3, CPB, RIGHT LEG SVEVH,   GARCIA to LAD  SVG to int tiffany  SVG to FELECIA    VICTORINA AND EPI AORTIC U/S BY DR GOMEZ    Operative Findings:  At the time of the procedure there was mild depression ov LV function .  The coronary arteries were diffusely diseased.  The PDA was not grafted.  The GINNY was good quality with good flow.  The vein was of good quality.     Indications:  See pre op diagnosis     Procedure Details     After informed consent was obtained for the above mentioned procedure, the patient was then taken to the operating room.  Appropriate monitoring lines were placed by the Anesthesia Department.   A complete surgical timeout was completed. The VICTORINA report was reviewed with anesthesia.  After administration of general endotracheal anesthesia, the patient was prepped and draped in the usual sterile fashion. The chest was then entered through a standard mediastinotomy incision while simultaneously harvesting of peripheral conduit was undertaken using endoscopic technique following a bolus of heparin.  After harvesting the conduit, it was inspected and noted to be adequate. The epiaortic ultrasound was performed and reviewed. The left internal mammary harvested was then undertaken in pedicle distal pedicle was incised and noted to bleed briskly.  GINNY was prepared with papaverine.  The pericardium was then opened and tacked up into a

## 2024-07-15 NOTE — PROGRESS NOTES
Pt seen this am and HPI reviewed with pt  Cath findings reviewed and discussed with pt  Indications and risks of cabg reviewed  Will proceed in am

## 2024-07-15 NOTE — ANESTHESIA PROCEDURE NOTES
Arterial Line:    An arterial line was placed using surface landmarks, in the pre-op for the following indication(s): continuous blood pressure monitoring and blood sampling needed.    A 20 gauge (size) (length), Arrow (type) catheter was placed, Seldinger technique used, into the radial artery, secured by Tegaderm.  Anesthesia type: Local  Local infiltration: Injection    Events:  patient tolerated procedure well with no complications.7/15/2024 7:01 AM7/15/2024 7:11 AM  Anesthesiologist: Shaheen Brown MD  Performed: Anesthesiologist   Preanesthetic Checklist  Completed: patient identified, IV checked, site marked, risks and benefits discussed, surgical/procedural consents, equipment checked, pre-op evaluation, timeout performed, anesthesia consent given, oxygen available, monitors applied/VS acknowledged and fire risk safety assessment completed and verbalized

## 2024-07-15 NOTE — PERIOP NOTE
Report called to [facility] CV[facility] RN.  Information given to include patient's name, age, allergies, height, weight, PMH, invasive lines, procedure, chest tubes, and anesthesia drips.

## 2024-07-15 NOTE — ANESTHESIA PRE PROCEDURE
Department of Anesthesiology  Preprocedure Note       Name:  Matthew Puente   Age:  83 y.o.  :  1940                                          MRN:  [id number]         Date:  7/15/2024      Surgeon: Surgeon(s):  Shay Church MD    Procedure: Procedure(s):  ON PUMP CORONARY ARTERY BYPASS GRAFT(CABG) WITH ENDOSCOPIC VEIN HARVEST (EVH) WITH LIGARION OF LEFT APPENDAGE AND CARDIOPLEGIA. NO PAC    Medications prior to admission:   Prior to Admission medications    Medication Sig Start Date End Date Taking? Authorizing Provider   vitamin C (ASCORBIC ACID) 500 MG tablet Take 1 tablet by mouth daily   Yes Elena Grier MD   calcium carbonate 600 MG TABS tablet Take 1 tablet by mouth daily   Yes Elena Grier MD   docusate sodium (COLACE) 100 MG capsule Take 1 capsule by mouth as needed for Constipation   Yes Elena Grier MD   carboxymethylcellulose PF (RETAINE CMC) 0.5 % SOLN ophthalmic solution Place 1 drop into both eyes 2 times daily   Yes Elena Grier MD   amoxicillin (AMOXIL) 500 MG capsule Take 1 capsule by mouth 2 times daily 24   Elena Grier MD   lactulose (CHRONULAC) 10 GM/15ML solution Take 45 mLs by mouth every evening 24   Sushant Larsen MD   vitamin E 400 UNIT capsule Take 3 capsules by mouth daily    Elena Grier MD   clobetasol (TEMOVATE) 0.05 % cream Apply topically 2 times daily Apply topically 2 times daily.    Elena Grier MD   clindamycin (CLEOCIN T) 1 % lotion Apply topically 2 times daily as needed Apply topically 2 times daily.    Elena Grier MD   ammonium lactate (LAC-HYDRIN) 5 % LOTN lotion Apply topically as needed    Elena Grier MD   Multiple Vitamin (MULTIVITAMIN PO) Take by mouth    Automatic Reconciliation, Ar   aspirin 81 MG EC tablet Take 1 tablet by mouth daily    Automatic Reconciliation, Ar   latanoprost (XALATAN) 0.005 % ophthalmic solution ceived the following from [facility]

## 2024-07-15 NOTE — PLAN OF CARE
2200 Patient drank 28 oz of gatorade and bathed. Educated on incentive spirometer.    0001 heparin gtt stopped per order    0400 Patient drank 14 oz of gatorade and bathed    Problem: Discharge Planning  Goal: Discharge to home or other facility with appropriate resources  7/14/2024 2137 by Harshal Astudillo RN  Outcome: Progressing  7/14/2024 1041 by Lulu Ybarra RN  Outcome: Progressing     Problem: Skin/Tissue Integrity  Goal: Absence of new skin breakdown  Description: 1.  Monitor for areas of redness and/or skin breakdown  2.  Assess vascular access sites hourly  3.  Every 4-6 hours minimum:  Change oxygen saturation probe site  4.  Every 4-6 hours:  If on nasal continuous positive airway pressure, respiratory therapy assess nares and determine need for appliance change or resting period.  7/14/2024 2137 by Harshal Astudillo RN  Outcome: Progressing  7/14/2024 1041 by Lulu Ybarra RN  Outcome: Progressing     Problem: ABCDS Injury Assessment  Goal: Absence of physical injury  7/14/2024 2137 by Harshal Astudillo RN  Outcome: Progressing  7/14/2024 1041 by Lulu Ybarra RN  Outcome: Progressing     Problem: Safety - Adult  Goal: Free from fall injury  7/14/2024 2137 by Harshal Astudillo RN  Outcome: Progressing  7/14/2024 1041 by Lulu Ybarra RN  Outcome: Progressing

## 2024-07-15 NOTE — PROGRESS NOTES
4 Eyes Skin Assessment     NAME:  Matthew Puente  YOB: 1940  MEDICAL RECORD NUMBER:  [id number]    The patient is being assessed for  Post-Op Surgical    I agree that at least one RN has performed a thorough Head to Toe Skin Assessment on the patient. ALL assessment sites listed below have been assessed.      Areas assessed by both nurses:    Head, Face, Ears, Shoulders, Back, Chest, Arms, Elbows, Hands, Sacrum. Buttock, Coccyx, Ischium, Legs. Feet and Heels, Under Medical Devices , and Other          Does the Patient have a Wound? No noted wound(s)       Kalyan Prevention initiated by RN: Yes  Wound Care Orders initiated by RN: No    Pressure Injury (Stage 3,4, Unstageable, DTI, NWPT, and Complex wounds) if present, place Wound referral order by RN under : No    New Ostomies, if present place, Ostomy referral order under : No     Nurse 1 eSignature: Electronically signed by HAILE RAZA RN on 7/15/24 at 7:31 PM EDT    **SHARE this note so that the co-signing nurse can place an eSignature**    Nurse 2 eSignature: Electronically signed by Sagar Parmar RN on 7/15/24 at 7:32 PM EDT

## 2024-07-15 NOTE — ANESTHESIA PROCEDURE NOTES
Procedure Performed: VICTORINA       Start Time:        End Time:      Preanesthesia Checklist:  Patient identified, IV assessed, risks and benefits discussed, monitors and equipment assessed, procedure being performed at surgeon's request and anesthesia consent obtained.    General Procedure Information  Diagnostic Indications for Echo:  assessment of ascending aorta, assessment of surgical repair, hemodynamic monitoring and assessment of valve function  Location performed:  OR  Intubated  Bite block placed  Heart visualized  Probe Insertion:  Easy  Probe Type:  3D, mulitplane, epiaortic and biplane  Modalities:  2D, 3D, color flow mapping, continuous wave Doppler, pulse wave Doppler and M-mode    Echocardiographic and Doppler Measurements    Ventricles    Right Ventricle:  Cavity size normal.  Hypertrophy not present.  Thrombus not present.  Global function normal.    Left Ventricle:  Cavity size normal.  Hypertrophy not present.  Thrombus not present.  Global Function mildly impaired.    Other Ventricular Findings:       LVEF 45-50    Ventricular Regional Function:  1- Basal Anteroseptal:  normal  2- Basal Anterior:  normal  3- Basal Anterolateral:  normal  4- Basal Inferolateral:  normal  5- Basal Inferior:  normal  6- Basal Inferoseptal:  normal  7- Mid Anteroseptal:  normal  8- Mid Anterior:  normal  9- Mid Anterolateral:  normal  10- Mid Inferolateral:  normal  11- Mid Inferior:  hypokinetic  12- Mid Inferoseptal:  normal  13- Apical Anterior:  normal  14- Apical Lateral:  normal  15- Apical Inferior:  hypokinetic  16- Apical Septal:  normal  17- [place]:  hypokinetic    Wall Motion Comments:       No WMA    Valves    Aortic Valve:  Annulus normal.  Stenosis not present.  Pressure Half-time: 614 ms.  Regurgitation mild.  Leaflets normal.  Leaflet motions normal.      Mitral Valve:  Annulus normal.  Stenosis not present.  Regurgitation mild.  Leaflets normal.  Leaflet motions normal.      Tricuspid Valve:  Annulus

## 2024-07-15 NOTE — DIABETES MGMT
[facility]  DIABETES MANAGEMENT CONSULT    Consulted by Provider for advanced nursing evaluation and care for inpatient blood glucose management.    Setting Blood glucose targets   Critical care 140 - 180 mg/dl   Non-critical care 100 - 180 mg/dl     Evaluation and Action Plan   This 83 year old  male was admitted 7/13/24 from ER after experiencing substernal chest pain with radiation to the mid-back. Studies revealed multi-vessel CAD and patient underwent CABG today 7/15/24. Recovering from anesthesia. Patient does not have diabetes but there is a family history of diabetes. Bgs will be managed via insulin infusion per protocol X48 hours. Will follow along.    Management Rationale Action Plan   Medication   Basal needs CV surgery post-op protocol Insulin infusion X48 hrs   Nutritional needs     Corrective insulin     Additional orders             Initial Presentation   Matthew Puente is a 83 y.o. male admitted 7/13/24 from ER after experiencing substernal chest pain with radiation to the mid-back. Denied known personal cardiovascular risk factors or history, but also shared that he does not see a cardiologist, and has never had formal cardiac evaluation in the past.     Per ER provider, \"Patient is bradycardic in the ER, with heart rate largely in the 40s. Slightly hypertensive. Patient reports likely history of chronic baseline bradycardia, though he does not seem to be sure of this. He is EKG is concerning for some nonspecific ST changes. Differentials considered include ACS, esophagitis, musculoskeletal chest pain, pneumonia, pleurisy, aortic dissection, PE, etc.\"    ER exam:  Cardiovascular:      Rate and Rhythm: Regular rhythm. Bradycardia present.      Pulses: Normal pulses.      Heart sounds: Normal heart sounds.   Pulmonary:      Effort: Pulmonary effort is normal.      Breath sounds: Normal breath sounds.   Abdominal:      General: Abdomen is flat.      Palpations:  1.19 1.05   < > 1.1 0.7 1.13    < > = values in this interval not displayed.     Recent Labs     07/13/24  1043 07/14/24  0259 07/15/24  1405   LABGLOM 60* 70 64     Lab Results   Component Value Date    ALT 35 07/15/2024     (H) 07/15/2024    ALKPHOS 41 (L) 07/15/2024    BILITOT 1.3 (H) 07/15/2024     Lab Results   Component Value Date/Time    TRIG 165 07/13/2024 10:43 AM    TRIG 166 09/13/2023 11:38 AM    TRIG 228 06/16/2022 10:19 AM     Lab Results   Component Value Date    TSH 3.78 (H) 07/13/2024      Lab Results   Component Value Date    LABA1C 5.5 07/13/2024    LABA1C 5.6 09/13/2023    LABA1C 5.8 (H) 06/16/2022     Factors impacting BG management  Factor Dose Comments   Nutrition:  NPO with sips of clear liquids      Drugs:  Vasopressor load  Blood transfusion(s)   Sukhi infusion  No PRBCs   Affects insulin delivery     Pain: Post-op Scheduled Tylenol, Neurontin and lidocaine patch    Infection Prophylactic Ancef Q8 hrs Low grade fever  WBC elevated   Other:   Kidney function  Liver function   Normal creatinine        Blood glucose pattern      Significant diabetes-related events  7/13/24 Admission  => on insulin infusion    Assessment and Nursing Intervention   Nursing Diagnosis Risk for unstable blood glucose pattern   Nursing Intervention Domain 5253 Decision-making Support   Nursing Interventions Examined current inpatient diabetes/blood glucose control   Explored factors facilitating and impeding inpatient management  Explored corrective strategies with patient and responsible inpatient provider   Informed patient of rational for insulin strategy while hospitalized     Billing Code(s)   [] 77628 IP subsequent hospital care - 50 minutes    [] 15300 IP subsequent hospital care - 35 minutes   [] 47324 IP subsequent hospital care - 25 minutes  [] 66670 IP initial hospital care - 55 minutes  [x] 53207 IP initial hospital care - 40 minutes      Before making these care recommendations, I

## 2024-07-16 ENCOUNTER — APPOINTMENT (OUTPATIENT)
Facility: HOSPITAL | Age: 84
End: 2024-07-16
Payer: MEDICARE

## 2024-07-16 LAB
ACUTE KIDNEY INJURY RISK NEPHROCHECK: 0.87 (ref 0–0.3)
ACUTE KIDNEY INJURY RISK NEPHROCHECK: 2.44 (ref 0–0.3)
ANION GAP SERPL CALC-SCNC: 10 MMOL/L (ref 5–15)
ANION GAP SERPL CALC-SCNC: 10 MMOL/L (ref 5–15)
BLD PROD TYP BPU: NORMAL
BLOOD BANK BLOOD PRODUCT EXPIRATION DATE: NORMAL
BLOOD BANK DISPENSE STATUS: NORMAL
BLOOD BANK ISBT PRODUCT BLOOD TYPE: 6200
BLOOD BANK PRODUCT CODE: NORMAL
BLOOD BANK UNIT TYPE AND RH: NORMAL
BPU ID: NORMAL
BUN SERPL-MCNC: 17 MG/DL (ref 6–20)
BUN SERPL-MCNC: 22 MG/DL (ref 6–20)
BUN/CREAT SERPL: 12 (ref 12–20)
BUN/CREAT SERPL: 15 (ref 12–20)
CALCIUM SERPL-MCNC: 8.3 MG/DL (ref 8.5–10.1)
CALCIUM SERPL-MCNC: 8.4 MG/DL (ref 8.5–10.1)
CHLORIDE SERPL-SCNC: 105 MMOL/L (ref 97–108)
CHLORIDE SERPL-SCNC: 106 MMOL/L (ref 97–108)
CO2 SERPL-SCNC: 20 MMOL/L (ref 21–32)
CO2 SERPL-SCNC: 24 MMOL/L (ref 21–32)
CREAT SERPL-MCNC: 1.41 MG/DL (ref 0.7–1.3)
CREAT SERPL-MCNC: 1.5 MG/DL (ref 0.7–1.3)
EKG ATRIAL RATE: 86 BPM
EKG DIAGNOSIS: NORMAL
EKG Q-T INTERVAL: 340 MS
EKG QRS DURATION: 74 MS
EKG QTC CALCULATION (BAZETT): 413 MS
EKG R AXIS: -8 DEGREES
EKG T AXIS: 85 DEGREES
EKG VENTRICULAR RATE: 89 BPM
ERYTHROCYTE [DISTWIDTH] IN BLOOD BY AUTOMATED COUNT: 12.5 % (ref 11.5–14.5)
ERYTHROCYTE [DISTWIDTH] IN BLOOD BY AUTOMATED COUNT: 12.5 % (ref 11.5–14.5)
GLUCOSE BLD STRIP.AUTO-MCNC: 106 MG/DL (ref 65–117)
GLUCOSE BLD STRIP.AUTO-MCNC: 106 MG/DL (ref 65–117)
GLUCOSE BLD STRIP.AUTO-MCNC: 108 MG/DL (ref 65–117)
GLUCOSE BLD STRIP.AUTO-MCNC: 113 MG/DL (ref 65–117)
GLUCOSE BLD STRIP.AUTO-MCNC: 121 MG/DL (ref 65–117)
GLUCOSE BLD STRIP.AUTO-MCNC: 125 MG/DL (ref 65–117)
GLUCOSE BLD STRIP.AUTO-MCNC: 130 MG/DL (ref 65–117)
GLUCOSE BLD STRIP.AUTO-MCNC: 147 MG/DL (ref 65–117)
GLUCOSE BLD STRIP.AUTO-MCNC: 151 MG/DL (ref 65–117)
GLUCOSE BLD STRIP.AUTO-MCNC: 165 MG/DL (ref 65–117)
GLUCOSE BLD STRIP.AUTO-MCNC: 180 MG/DL (ref 65–117)
GLUCOSE BLD STRIP.AUTO-MCNC: 189 MG/DL (ref 65–117)
GLUCOSE BLD STRIP.AUTO-MCNC: 191 MG/DL (ref 65–117)
GLUCOSE BLD STRIP.AUTO-MCNC: 202 MG/DL (ref 65–117)
GLUCOSE BLD STRIP.AUTO-MCNC: 215 MG/DL (ref 65–117)
GLUCOSE BLD STRIP.AUTO-MCNC: 98 MG/DL (ref 65–117)
GLUCOSE SERPL-MCNC: 112 MG/DL (ref 65–100)
GLUCOSE SERPL-MCNC: 148 MG/DL (ref 65–100)
HCT VFR BLD AUTO: 23.5 % (ref 36.6–50.3)
HCT VFR BLD AUTO: 26.7 % (ref 36.6–50.3)
HGB BLD-MCNC: 12.3 G/DL (ref 12.1–17)
HGB BLD-MCNC: 8.1 G/DL (ref 12.1–17)
HGB BLD-MCNC: 8.9 G/DL (ref 12.1–17)
HGB BLD-MCNC: 8.9 G/DL (ref 12.1–17)
HGB BLD-MCNC: 9.1 G/DL (ref 12.1–17)
HGB BLD-MCNC: 9.1 G/DL (ref 12.1–17)
HGB BLD-MCNC: 9.3 G/DL (ref 12.1–17)
LACTATE SERPL-SCNC: 2.1 MMOL/L (ref 0.4–2)
LACTATE SERPL-SCNC: 3 MMOL/L (ref 0.4–2)
LACTATE SERPL-SCNC: 3.9 MMOL/L (ref 0.4–2)
MAGNESIUM SERPL-MCNC: 2.2 MG/DL (ref 1.6–2.4)
MCH RBC QN AUTO: 32.1 PG (ref 26–34)
MCH RBC QN AUTO: 32.2 PG (ref 26–34)
MCHC RBC AUTO-ENTMCNC: 34.5 G/DL (ref 30–36.5)
MCHC RBC AUTO-ENTMCNC: 34.8 G/DL (ref 30–36.5)
MCV RBC AUTO: 92.4 FL (ref 80–99)
MCV RBC AUTO: 93.3 FL (ref 80–99)
NRBC # BLD: 0 K/UL (ref 0–0.01)
NRBC # BLD: 0 K/UL (ref 0–0.01)
NRBC BLD-RTO: 0 PER 100 WBC
NRBC BLD-RTO: 0 PER 100 WBC
PLATELET # BLD AUTO: 103 K/UL (ref 150–400)
PLATELET # BLD AUTO: 145 K/UL (ref 150–400)
PMV BLD AUTO: 10.6 FL (ref 8.9–12.9)
PMV BLD AUTO: 9.6 FL (ref 8.9–12.9)
POTASSIUM SERPL-SCNC: 3.9 MMOL/L (ref 3.5–5.1)
POTASSIUM SERPL-SCNC: 4.5 MMOL/L (ref 3.5–5.1)
RBC # BLD AUTO: 2.52 M/UL (ref 4.1–5.7)
RBC # BLD AUTO: 2.89 M/UL (ref 4.1–5.7)
SERVICE CMNT-IMP: ABNORMAL
SERVICE CMNT-IMP: NORMAL
SODIUM SERPL-SCNC: 136 MMOL/L (ref 136–145)
SODIUM SERPL-SCNC: 139 MMOL/L (ref 136–145)
UNIT DIVISION: 0
UNIT ISSUE DATE/TIME: NORMAL
WBC # BLD AUTO: 11 K/UL (ref 4.1–11.1)
WBC # BLD AUTO: 13.3 K/UL (ref 4.1–11.1)

## 2024-07-16 PROCEDURE — 6370000000 HC RX 637 (ALT 250 FOR IP): Performed by: NURSE PRACTITIONER

## 2024-07-16 PROCEDURE — P9045 ALBUMIN (HUMAN), 5%, 250 ML: HCPCS | Performed by: NURSE PRACTITIONER

## 2024-07-16 PROCEDURE — 97530 THERAPEUTIC ACTIVITIES: CPT

## 2024-07-16 PROCEDURE — 85027 COMPLETE CBC AUTOMATED: CPT

## 2024-07-16 PROCEDURE — 2580000003 HC RX 258: Performed by: NURSE PRACTITIONER

## 2024-07-16 PROCEDURE — 94760 N-INVAS EAR/PLS OXIMETRY 1: CPT

## 2024-07-16 PROCEDURE — 83735 ASSAY OF MAGNESIUM: CPT

## 2024-07-16 PROCEDURE — 3E033XZ INTRODUCTION OF VASOPRESSOR INTO PERIPHERAL VEIN, PERCUTANEOUS APPROACH: ICD-10-PCS | Performed by: HOSPITALIST

## 2024-07-16 PROCEDURE — 97535 SELF CARE MNGMENT TRAINING: CPT

## 2024-07-16 PROCEDURE — 2580000003 HC RX 258: Performed by: PHYSICIAN ASSISTANT

## 2024-07-16 PROCEDURE — 97116 GAIT TRAINING THERAPY: CPT

## 2024-07-16 PROCEDURE — 83605 ASSAY OF LACTIC ACID: CPT

## 2024-07-16 PROCEDURE — 6360000002 HC RX W HCPCS

## 2024-07-16 PROCEDURE — 2700000000 HC OXYGEN THERAPY PER DAY

## 2024-07-16 PROCEDURE — 82962 GLUCOSE BLOOD TEST: CPT

## 2024-07-16 PROCEDURE — 2500000003 HC RX 250 WO HCPCS: Performed by: NURSE PRACTITIONER

## 2024-07-16 PROCEDURE — P9045 ALBUMIN (HUMAN), 5%, 250 ML: HCPCS | Performed by: STUDENT IN AN ORGANIZED HEALTH CARE EDUCATION/TRAINING PROGRAM

## 2024-07-16 PROCEDURE — 2580000003 HC RX 258: Performed by: STUDENT IN AN ORGANIZED HEALTH CARE EDUCATION/TRAINING PROGRAM

## 2024-07-16 PROCEDURE — 36415 COLL VENOUS BLD VENIPUNCTURE: CPT

## 2024-07-16 PROCEDURE — P9045 ALBUMIN (HUMAN), 5%, 250 ML: HCPCS

## 2024-07-16 PROCEDURE — 71045 X-RAY EXAM CHEST 1 VIEW: CPT

## 2024-07-16 PROCEDURE — 2000000000 HC ICU R&B

## 2024-07-16 PROCEDURE — 6370000000 HC RX 637 (ALT 250 FOR IP): Performed by: PHYSICIAN ASSISTANT

## 2024-07-16 PROCEDURE — 97166 OT EVAL MOD COMPLEX 45 MIN: CPT

## 2024-07-16 PROCEDURE — P9045 ALBUMIN (HUMAN), 5%, 250 ML: HCPCS | Performed by: ANESTHESIOLOGY

## 2024-07-16 PROCEDURE — 80048 BASIC METABOLIC PNL TOTAL CA: CPT

## 2024-07-16 PROCEDURE — 6360000002 HC RX W HCPCS: Performed by: STUDENT IN AN ORGANIZED HEALTH CARE EDUCATION/TRAINING PROGRAM

## 2024-07-16 PROCEDURE — 6360000002 HC RX W HCPCS: Performed by: ANESTHESIOLOGY

## 2024-07-16 PROCEDURE — 6360000002 HC RX W HCPCS: Performed by: PHYSICIAN ASSISTANT

## 2024-07-16 PROCEDURE — 6360000002 HC RX W HCPCS: Performed by: NURSE PRACTITIONER

## 2024-07-16 PROCEDURE — 97161 PT EVAL LOW COMPLEX 20 MIN: CPT

## 2024-07-16 PROCEDURE — 6370000000 HC RX 637 (ALT 250 FOR IP): Performed by: ANESTHESIOLOGY

## 2024-07-16 PROCEDURE — 93005 ELECTROCARDIOGRAM TRACING: CPT | Performed by: PHYSICIAN ASSISTANT

## 2024-07-16 RX ORDER — LANOLIN ALCOHOL/MO/W.PET/CERES
3 CREAM (GRAM) TOPICAL NIGHTLY PRN
Status: DISCONTINUED | OUTPATIENT
Start: 2024-07-16 | End: 2024-07-19 | Stop reason: HOSPADM

## 2024-07-16 RX ORDER — CARBOXYMETHYLCELLULOSE SODIUM 5 MG/ML
1 SOLUTION/ DROPS OPHTHALMIC 2 TIMES DAILY
Status: DISCONTINUED | OUTPATIENT
Start: 2024-07-16 | End: 2024-07-19 | Stop reason: HOSPADM

## 2024-07-16 RX ORDER — ALBUMIN, HUMAN INJ 5% 5 %
12.5 SOLUTION INTRAVENOUS ONCE
Status: COMPLETED | OUTPATIENT
Start: 2024-07-16 | End: 2024-07-16

## 2024-07-16 RX ORDER — METHOCARBAMOL 500 MG/1
500 TABLET, FILM COATED ORAL 3 TIMES DAILY
Status: DISCONTINUED | OUTPATIENT
Start: 2024-07-16 | End: 2024-07-19 | Stop reason: HOSPADM

## 2024-07-16 RX ORDER — OXYCODONE HYDROCHLORIDE 5 MG/1
5 TABLET ORAL EVERY 4 HOURS PRN
Status: DISCONTINUED | OUTPATIENT
Start: 2024-07-16 | End: 2024-07-19 | Stop reason: HOSPADM

## 2024-07-16 RX ORDER — LACTULOSE 10 G/15ML
30 SOLUTION ORAL EVERY EVENING
Status: DISCONTINUED | OUTPATIENT
Start: 2024-07-16 | End: 2024-07-19 | Stop reason: HOSPADM

## 2024-07-16 RX ORDER — HYDROMORPHONE HYDROCHLORIDE 1 MG/ML
0.25 INJECTION, SOLUTION INTRAMUSCULAR; INTRAVENOUS; SUBCUTANEOUS EVERY 4 HOURS PRN
Status: DISCONTINUED | OUTPATIENT
Start: 2024-07-16 | End: 2024-07-17

## 2024-07-16 RX ORDER — SODIUM CHLORIDE, SODIUM LACTATE, POTASSIUM CHLORIDE, AND CALCIUM CHLORIDE .6; .31; .03; .02 G/100ML; G/100ML; G/100ML; G/100ML
500 INJECTION, SOLUTION INTRAVENOUS ONCE
Status: COMPLETED | OUTPATIENT
Start: 2024-07-16 | End: 2024-07-16

## 2024-07-16 RX ORDER — ALBUMIN, HUMAN INJ 5% 5 %
SOLUTION INTRAVENOUS
Status: COMPLETED
Start: 2024-07-16 | End: 2024-07-16

## 2024-07-16 RX ORDER — OXYCODONE HYDROCHLORIDE 5 MG/1
2.5 TABLET ORAL EVERY 4 HOURS PRN
Status: DISCONTINUED | OUTPATIENT
Start: 2024-07-16 | End: 2024-07-19 | Stop reason: HOSPADM

## 2024-07-16 RX ORDER — NOREPINEPHRINE BITARTRATE 0.06 MG/ML
1-100 INJECTION, SOLUTION INTRAVENOUS CONTINUOUS
Status: DISCONTINUED | OUTPATIENT
Start: 2024-07-16 | End: 2024-07-19

## 2024-07-16 RX ORDER — GABAPENTIN 100 MG/1
100 CAPSULE ORAL 2 TIMES DAILY
Status: DISCONTINUED | OUTPATIENT
Start: 2024-07-16 | End: 2024-07-19 | Stop reason: HOSPADM

## 2024-07-16 RX ORDER — ALBUMIN, HUMAN INJ 5% 5 %
25 SOLUTION INTRAVENOUS ONCE
Status: COMPLETED | OUTPATIENT
Start: 2024-07-16 | End: 2024-07-16

## 2024-07-16 RX ADMIN — CHLORHEXIDINE GLUCONATE 15 ML: 1.2 RINSE ORAL at 09:13

## 2024-07-16 RX ADMIN — POLYETHYLENE GLYCOL 3350 17 G: 17 POWDER, FOR SOLUTION ORAL at 08:48

## 2024-07-16 RX ADMIN — METHOCARBAMOL 500 MG: 500 TABLET ORAL at 14:50

## 2024-07-16 RX ADMIN — MAGNESIUM OXIDE TAB 400 MG (241.3 MG ELEMENTAL MG) 400 MG: 400 (241.3 MG) TAB at 08:48

## 2024-07-16 RX ADMIN — ALBUMIN (HUMAN) 25 G: 12.5 INJECTION, SOLUTION INTRAVENOUS at 07:20

## 2024-07-16 RX ADMIN — OXYCODONE 2.5 MG: 5 TABLET ORAL at 22:38

## 2024-07-16 RX ADMIN — SODIUM CHLORIDE, PRESERVATIVE FREE 10 ML: 5 INJECTION INTRAVENOUS at 09:13

## 2024-07-16 RX ADMIN — MUPIROCIN: 20 OINTMENT TOPICAL at 09:02

## 2024-07-16 RX ADMIN — SODIUM CHLORIDE: 4.5 INJECTION, SOLUTION INTRAVENOUS at 06:27

## 2024-07-16 RX ADMIN — ACETAMINOPHEN 975 MG: 325 TABLET ORAL at 18:17

## 2024-07-16 RX ADMIN — HYDROMORPHONE HYDROCHLORIDE 0.25 MG: 1 INJECTION, SOLUTION INTRAMUSCULAR; INTRAVENOUS; SUBCUTANEOUS at 15:43

## 2024-07-16 RX ADMIN — PHENYLEPHRINE HYDROCHLORIDE 20 MCG/MIN: 10 INJECTION INTRAVENOUS at 04:56

## 2024-07-16 RX ADMIN — INSULIN LISPRO 2 UNITS: 100 INJECTION, SOLUTION INTRAVENOUS; SUBCUTANEOUS at 18:36

## 2024-07-16 RX ADMIN — SENNOSIDES AND DOCUSATE SODIUM 1 TABLET: 50; 8.6 TABLET ORAL at 08:48

## 2024-07-16 RX ADMIN — ALBUMIN (HUMAN) 12.5 G: 12.5 INJECTION, SOLUTION INTRAVENOUS at 14:47

## 2024-07-16 RX ADMIN — WATER 2000 MG: 1 INJECTION INTRAMUSCULAR; INTRAVENOUS; SUBCUTANEOUS at 11:15

## 2024-07-16 RX ADMIN — Medication 1 DROP: at 18:21

## 2024-07-16 RX ADMIN — METHOCARBAMOL 500 MG: 500 TABLET ORAL at 21:04

## 2024-07-16 RX ADMIN — SODIUM CHLORIDE: 9 INJECTION, SOLUTION INTRAVENOUS at 04:51

## 2024-07-16 RX ADMIN — OXYCODONE 2.5 MG: 5 TABLET ORAL at 13:00

## 2024-07-16 RX ADMIN — ROSUVASTATIN 40 MG: 40 TABLET, FILM COATED ORAL at 08:48

## 2024-07-16 RX ADMIN — ACETAMINOPHEN 975 MG: 325 TABLET ORAL at 12:29

## 2024-07-16 RX ADMIN — WATER 2000 MG: 1 INJECTION INTRAMUSCULAR; INTRAVENOUS; SUBCUTANEOUS at 03:06

## 2024-07-16 RX ADMIN — HYDROMORPHONE HYDROCHLORIDE 0.5 MG: 1 INJECTION, SOLUTION INTRAMUSCULAR; INTRAVENOUS; SUBCUTANEOUS at 03:04

## 2024-07-16 RX ADMIN — ALBUMIN (HUMAN) 12.5 G: 12.5 INJECTION, SOLUTION INTRAVENOUS at 13:31

## 2024-07-16 RX ADMIN — MUPIROCIN: 20 OINTMENT TOPICAL at 20:30

## 2024-07-16 RX ADMIN — CHLORHEXIDINE GLUCONATE 15 ML: 1.2 RINSE ORAL at 20:29

## 2024-07-16 RX ADMIN — OXYCODONE 10 MG: 5 TABLET ORAL at 00:52

## 2024-07-16 RX ADMIN — ALBUMIN, HUMAN INJ 5% 12.5 G: 5 SOLUTION at 13:31

## 2024-07-16 RX ADMIN — MAGNESIUM OXIDE TAB 400 MG (241.3 MG ELEMENTAL MG) 400 MG: 400 (241.3 MG) TAB at 21:04

## 2024-07-16 RX ADMIN — ALBUMIN (HUMAN) 12.5 G: 12.5 INJECTION, SOLUTION INTRAVENOUS at 02:26

## 2024-07-16 RX ADMIN — SODIUM CHLORIDE, PRESERVATIVE FREE 10 ML: 5 INJECTION INTRAVENOUS at 20:29

## 2024-07-16 RX ADMIN — GABAPENTIN 100 MG: 100 CAPSULE ORAL at 21:04

## 2024-07-16 RX ADMIN — Medication 1 DROP: at 21:04

## 2024-07-16 RX ADMIN — METHOCARBAMOL 500 MG: 500 TABLET ORAL at 11:03

## 2024-07-16 RX ADMIN — SODIUM CHLORIDE, POTASSIUM CHLORIDE, SODIUM LACTATE AND CALCIUM CHLORIDE 500 ML: 600; 310; 30; 20 INJECTION, SOLUTION INTRAVENOUS at 17:01

## 2024-07-16 RX ADMIN — SODIUM CHLORIDE 11.6 UNITS/HR: 9 INJECTION, SOLUTION INTRAVENOUS at 21:16

## 2024-07-16 RX ADMIN — SENNOSIDES AND DOCUSATE SODIUM 1 TABLET: 50; 8.6 TABLET ORAL at 21:04

## 2024-07-16 RX ADMIN — ASPIRIN 81 MG: 81 TABLET, COATED ORAL at 08:48

## 2024-07-16 RX ADMIN — SODIUM CHLORIDE 2 MCG/MIN: 9 INJECTION, SOLUTION INTRAVENOUS at 08:38

## 2024-07-16 RX ADMIN — FAMOTIDINE 20 MG: 20 TABLET, FILM COATED ORAL at 08:48

## 2024-07-16 RX ADMIN — Medication 3 MG: at 22:38

## 2024-07-16 RX ADMIN — WATER 2000 MG: 1 INJECTION INTRAMUSCULAR; INTRAVENOUS; SUBCUTANEOUS at 19:16

## 2024-07-16 RX ADMIN — ACETAMINOPHEN 975 MG: 325 TABLET ORAL at 06:30

## 2024-07-16 ASSESSMENT — PAIN DESCRIPTION - LOCATION
LOCATION: CHEST

## 2024-07-16 ASSESSMENT — PAIN SCALES - GENERAL
PAINLEVEL_OUTOF10: 0
PAINLEVEL_OUTOF10: 3
PAINLEVEL_OUTOF10: 4
PAINLEVEL_OUTOF10: 7
PAINLEVEL_OUTOF10: 5
PAINLEVEL_OUTOF10: 5
PAINLEVEL_OUTOF10: 4
PAINLEVEL_OUTOF10: 4
PAINLEVEL_OUTOF10: 0
PAINLEVEL_OUTOF10: 5
PAINLEVEL_OUTOF10: 7
PAINLEVEL_OUTOF10: 4
PAINLEVEL_OUTOF10: 3

## 2024-07-16 ASSESSMENT — PAIN DESCRIPTION - DESCRIPTORS: DESCRIPTORS: ACHING

## 2024-07-16 ASSESSMENT — PAIN DESCRIPTION - ORIENTATION: ORIENTATION: ANTERIOR

## 2024-07-16 NOTE — PROGRESS NOTES
Occupational Therapy  07/16/24    Orders received, chart reviewed and patient evaluated by occupational therapy. Pending progression with skilled acute occupational therapy, recommend:  Therapy 3 hours/day 5-7 days/week    Recommend with nursing patient to complete as able in order to maintain strength, endurance and independence: OOB to chair 3x/day with 2 assist, ADLs with assist, and mobilizing to the bedpan vs BSC for toileting with 2 assist. Thank you for your assistance.     Full evaluation to follow.    Thank you,  Megan Riddle OTS

## 2024-07-16 NOTE — PROGRESS NOTES
24    Physical Exam  Vitals and nursing note reviewed.   Constitutional:       General: He is not in acute distress.     Appearance: Normal appearance. He is normal weight.   Eyes:      General: No scleral icterus.     Conjunctiva/sclera: Conjunctivae normal.      Pupils: Pupils are equal, round, and reactive to light.   Cardiovascular:      Rate and Rhythm: Normal rate and regular rhythm.      Pulses: Normal pulses.      Heart sounds: Normal heart sounds. No murmur heard.     Comments: post sternotomy, erasto x3, pacer wires  Pulmonary:      Effort: Pulmonary effort is normal. No respiratory distress.      Breath sounds: Normal breath sounds.   Abdominal:      General: Abdomen is flat. Bowel sounds are normal. There is no distension.      Palpations: Abdomen is soft.   Musculoskeletal:      Right lower leg: No edema.      Left lower leg: No edema.   Skin:     General: Skin is warm and dry.      Capillary Refill: Capillary refill takes less than 2 seconds.      Coloration: Skin is not jaundiced.   Neurological:      General: No focal deficit present.      Mental Status: He is alert and oriented to person, place, and time. Mental status is at baseline.      Cranial Nerves: No cranial nerve deficit.   Psychiatric:         Mood and Affect: Mood normal.         Behavior: Behavior normal.          BP (!) 91/56   Pulse 93   Temp 99.1 °F (37.3 °C) (Axillary)   Resp 21   Ht 1.588 m (5' 2.52\")   Wt 62.7 kg (138 lb 3.7 oz)   SpO2 98%   BMI 24.86 kg/m²      Temp (24hrs), Av.1 °F (36.7 °C), Min:96.3 °F (35.7 °C), Max:100 °F (37.8 °C)           Intake/Output:     Intake/Output Summary (Last 24 hours) at 2024 0827  Last data filed at 2024 0700  Gross per 24 hour   Intake 2564.79 ml   Output 3860 ml   Net -1295.21 ml         Imaging    No valid procedures specified.         CRITICAL CARE DOCUMENTATION  I had a face to face encounter with the patient, reviewed and interpreted patient data including  clinical events, labs, images, vital signs, I/O's, and examined patient.  I have discussed the case and the plan and management of the patient's care with the consulting services, the bedside nurses and the respiratory therapist.      NOTE OF PERSONAL INVOLVEMENT IN CARE   This patient has a high probability of imminent, clinically significant deterioration, which requires the highest level of preparedness to intervene urgently. I participated in the decision-making and personally managed or directed the management of the following life and organ supporting interventions that required my frequent assessment to treat or prevent imminent deterioration.    I personally spent 35 minutes of critical care time.  This is time spent at this critically ill patient's bedside actively involved in patient care as well as the coordination of care.  This does not include any procedural time which has been billed separately.    Miguel Smith MD  Staff Intensivist  [facility]

## 2024-07-16 NOTE — CARE COORDINATION
Care Management Initial Assessment       RUR: 14% - low  Readmission? No  1st IM letter given? Yes - 7/16/24  1st  letter given: No    CM spoke with patient at bedside re initial assessment and discharge plan. Patient endorses residing at address on file alone. Family is local and able to assist with transportation. No DME at baseline. [facility] on [address] for prescriptions. Patient stated he may go to a relative's house after discharge. Agreeable to home health or acute rehab if recommended by treatment team.    Per Cardiac Surgery rounds - patient will remain in CVICU for next couple of days. May need acute rehab when stable for discharge. No authorization required for facility placement.    CM will continue to follow.       07/16/24 0970   Service Assessment   Patient Orientation Alert and Oriented   Cognition Alert   History Provided By Patient   Primary Caregiver Self   Support Systems Children;Family Members   Patient's Healthcare Decision Maker is: Named in Scanned ACP Document   PCP Verified by CM Yes   Last Visit to PCP Within last 3 months   Prior Functional Level Independent in ADLs/IADLs   Current Functional Level Independent in ADLs/IADLs   Can patient return to prior living arrangement Unknown at present   Ability to make needs known: Fair   Family able to assist with home care needs: Yes   Would you like for me to discuss the discharge plan with any other family members/significant others, and if so, who? Yes  (children)   Financial Resources Medicare   Community Resources None   Social/Functional History   Lives With Alone   Type of Home House   Home Equipment None   Receives Help From Family   ADL Assistance Independent   Homemaking Assistance Independent   Homemaking Responsibilities Yes   Ambulation Assistance Independent   Transfer Assistance Independent   Active  No   Patient's  Info family   Occupation Retired   Discharge Planning   Type of Residence House   Living Arrangements  Alone   Current Services Prior To Admission None   Potential Assistance Needed N/A   DME Ordered? No   Potential Assistance Purchasing Medications No   Type of Home Care Services Skilled Therapy;Nursing Services   Patient expects to be discharged to: House   Services At/After Discharge   Transition of Care Consult (CM Consult) Discharge Planning   Services At/After Discharge Home Health;Inpatient rehab   [place] Resource Information Provided? No   Mode of Transport at Discharge Other (see comment)  (family)   Confirm Follow Up Transport Family   Condition of Participation: Discharge Planning   The Plan for Transition of Care is related to the following treatment goals: acute rehab   The Patient and/or Patient Representative was provided with a Choice of Provider? Patient   The Patient and/Or Patient Representative agree with the Discharge Plan? Yes   Freedom of Choice list was provided with basic dialogue that supports the patient's individualized plan of care/goals, treatment preferences, and shares the quality data associated with the providers?  Yes     Bacilio Sandoval, MPH  Care Manager l [facility]  Available via [employer] or

## 2024-07-16 NOTE — PROGRESS NOTES
0800- report received. All care assumed    1000- Patient complaining of pain. NP to put in orders. UOP discussed    1318- NP Stephen notified of labile BP. Order received for 250ml of albumin    1340- Neuro status discussed with NP. Will come to bedside to assess    1425- NP at bedside. Lactic 3.0. Order to give an additional 250 ml of albumin and recheck lactic in two hours.    1645- Lactic 3.9 NP and Dr. Smith notified. Order for 500 LR and to recheck at 2200. CPAP with sleep    2000- Bedside and Verbal shift change report given to Alison GRULLON (oncoming nurse) by Erum RN (offgoing nurse). Report included the following information Nurse Handoff Report, Intake/Output, MAR, Recent Results, and Cardiac Rhythm SR .

## 2024-07-16 NOTE — PROGRESS NOTES
Problem: Physical Therapy - Adult  Goal: By Discharge: Performs mobility at highest level of function for planned discharge setting.  See evaluation for individualized goals.  Description: FUNCTIONAL STATUS PRIOR TO ADMISSION: Patient was independent and active without use of DME. Denies hx falls. Lives alone but has many supportive family members in area. Hx of significant glaucoma with visual impairment, reports he does continue to drive on a restricted license (only short distances during daylight hours), otherwise takes ride-share options. Per chart, either family will stay with patient or he may be able to stay with family at discharge.     HOME SUPPORT PRIOR TO ADMISSION: The patient lived alone with local family to provide assistance.    Physical Therapy Goals  Initiated 7/16/2024  1.  Patient will move from supine to sit and sit to supine in bed with minimal assistance within 5 day(s).    2.  Patient will perform sit to stand with minimal assistance within 5 day(s).  3.  Patient will transfer from bed to chair and chair to bed with minimal assistance using the least restrictive device within 5 day(s).  4.  Patient will ambulate with minimal assistance for 30 feet with the least restrictive device within 5 day(s).   5.  Patient will ascend/descend 3 stairs with one handrail(s) with minimal assistance within 5 day(s).  6.  Patient will perform cardiac exercises per protocol with contact guard assist within 5 days.  7.  Patient will verbally recall and functionally demonstrate mindful-based movements (\"move in the tube\") principles without cues within 5 days.    Outcome: Progressing    PHYSICAL THERAPY EVALUATION    Patient: Matthew Puente (83 y.o. male)  Date: 7/16/2024  Primary Diagnosis: Chest pain [R07.9]  NSTEMI (non-ST elevated myocardial infarction) (HCC) [I21.4]  Procedure(s) (LRB):  CORONARY ARTERY BYPASS GRAFT x3, CPB, RIGHT LEG SVEVH, LIMA, VICTORINA AND EPI AORTIC U/S BY DR GOMEZ (N/A) 1 Day Post-Op  reach    COMMUNICATION/EDUCATION:   The patient's plan of care was discussed with: occupational therapist and registered nurse    Patient Education  Education Given To: Patient;Family  Education Provided: Role of Therapy;Plan of Care;Transfer Training;Energy Conservation;Fall Prevention Strategies;Precautions  Education Provided Comments: move in the tube, PLB, use of samantha bear for bracing  Education Method: Verbal;Demonstration;Teach Back  Barriers to Learning: Cognition  Education Outcome: Continued education needed    Thank you for this referral.  Tammie Christy, PT, DPT  Minutes: 41      Physical Therapy Evaluation Charge Determination   History Examination Presentation Decision-Making   MEDIUM  Complexity : 1-2 comorbidities / personal factors will impact the outcome/ POC  LOW Complexity : 1-2 Standardized tests and measures addressing body structure, function, activity limitation and / or participation in recreation  LOW Complexity : Stable, uncomplicated  AM-PAC  HIGH    Based on the above components, the patient evaluation is determined to be of the following complexity level: Low

## 2024-07-16 NOTE — PROGRESS NOTES
Cardiac Surgery Care Coordinator-  Met with Matthew Puente. Reviewed plan of care and discussed goals for the day. Matthew Puente has a good understanding of his plan for the day.  Reinforced sternal precautions and encouraged continued use of the incentive spirometer.  Will continue to follow for educational and emotional needs.

## 2024-07-16 NOTE — PLAN OF CARE
Problem: Occupational Therapy - Adult  Goal: By Discharge: Performs self-care activities at highest level of function for planned discharge setting.  See evaluation for individualized goals.  Description: FUNCTIONAL STATUS PRIOR TO ADMISSION: PTA patient was living alone and IND with ADLs/functional mobility. Hx of glaucoma with restricted daytime driving, family assists PRN, also utilizes Uber/Ly[employer] services PRN. Has supportive children who are able to assist (potential for staying with children upon d/c pending progress)    Occupational Therapy Goals:  Initiated 7/16/2024    1.  Patient will perform ADLs standing 5 mins without fatigue or LOB with Minimal Assist within 7 days.  2.  Patient will perform upper body ADLs with Minimal Assist within 7 days.  3.  Patient will perform lower body ADLs with Moderate Assist and AE PRN within 7 days.    4.  Patient will perform toilet transfers with Moderate Assist to [facility] within 7 days.  5.  Patient will perform all aspects of toileting with Moderate Assist within 7 days.  6.  Patient will participate in seated cardiac/sternal upper extremity therapeutic exercise/activities to increase independence with ADLs with Minimal Assist for 5 minutes within 7 days.   7.  Patient will adhere to Move in the Tube precautions during functional tasks with verbal, visual, tactile cues within 7 days.     Outcome: Progressing     OCCUPATIONAL THERAPY EVALUATION    Patient: Matthew Puente (83 y.o. male)  Date: 7/16/2024  Primary Diagnosis: Chest pain [R07.9]  NSTEMI (non-ST elevated myocardial infarction) (HCC) [I21.4]  Procedure(s) (LRB):  CORONARY ARTERY BYPASS GRAFT x3, CPB, RIGHT LEG SVEVH, LIMA, VICTORINA AND EPI AORTIC U/S BY DR GOMEZ (N/A) 1 Day Post-Op     Precautions: Fall Risk, Surgical protocol (move in the tube)                  ASSESSMENT :  The patient is limited by decreased functional mobility, independence in ADLs, high-level IADLs, ROM, strength, activity tolerance, vision,  living).    Cutoff score 39.4 (19) correlates to a good likelihood of discharging home versus a facility  Ivy Newman, Marie Hernández, Mario Conway, Lauren Pressley, Esau Virgen, Gavino Newman, -PAC “6-Clicks” Functional Assessment Scores Predict Acute Care Hospital Discharge Destination, Physical Therapy, Volume 94, Issue 9, 2014, Pages 8388-4322, [email or web address]    Pain Ratin/10 chest/incisional pain post activity     Activity Tolerance:   Fair , requires rest breaks, and signs and symptoms of orthostatic hypotension    After treatment:   Patient left in no apparent distress in bed, Call bell within reach, and Bed/ chair alarm activated    COMMUNICATION/EDUCATION:   The patient's plan of care was discussed with: physical therapist and registered nurse    Patient Education  Education Given To: Patient  Education Provided: Role of Therapy;Precautions;Plan of Care;Transfer Training;Mobility Training;Fall Prevention Strategies;ADL Adaptive Strategies;Orientation  Education Provided Comments: Move in the tube  Education Method: Verbal;Demonstration;Teach Back  Barriers to Learning: Cognition;Hearing  Education Outcome: Continued education needed;Unable to demonstrate understanding    Thank you for this referral.  Megan Riddle [facility]  Minutes: 40    Occupational Therapy Evaluation Charge Determination   History Examination Decision-Making   LOW Complexity : Brief history review  MEDIUM Complexity: 3-5 Performance deficits relating to physical, cognitive, or psychosocial skills that result in activity limitations and/or participation restrictions MEDIUM Complexity: Patient may present with comorbidities that affect occupational performance. Minimal to moderate modifications of tasks or assist (eg. physical or verbal) with assist is necessary to enable pt to complete eval   Based on the above components, the patient evaluation is determined to be of the

## 2024-07-16 NOTE — PROGRESS NOTES
1950: Bedside and Verbal shift change report given to willis (oncoming nurse) by betsy (offgoing nurse). Report included the following information Nurse Handoff Report.   Currently on thais gtt and insulin gtt-drips dual verified       2150: pt very drowsy at this time-unable to perform feet to floor per ERAS protocol         2156: initial nephrocheck resulted at 0.83-will recheck per protocol        0200: MAPs labile, UOP dropped, made nocturnal intensivist KIRSTEN Dickinson aware of MAPs & UOP as well as elevated nephrocheck- 250 cc 5% albumin ordered      0226: albumin administered      0434: nephrocheck resulted at 2.44, repeat KRISTI, lactic and creatinine ordered per protocol        0600: UOP dropped off past few hours, MAPs labile , made intensivist aware-awaiting orders      0700: orders received to give 500 cc albumin per intensivist ETELVINA Smith MD         0815: Bedside and Verbal shift change report given to aj (oncoming nurse) by willis (offgoing nurse). Report included the following information Nurse Handoff Report.

## 2024-07-16 NOTE — PROGRESS NOTES
Cardiac Surgery ICU Progress Note    Admit Date: 2024  POD:  1 Day Post-Op    Procedure:    7/15/24 with Dr. Church:   CORONARY ARTERY BYPASS GRAFT x3, CPB, RIGHT LEG SVEVH,   GARCIA to LAD  SVG to int tiffany  SVG to FELECIA   VICTORINA AND EPI AORTIC U/S BY DR GOMEZ    [facility] Synopsis:  : Admitted to ED with substernal chest pressure. Troponin positive. Underwent LHC with Dr. Walters which revealed MVD. He was admitted and started on heparin infusion  : Troponin continues to rise, started on nitroglycerin infusion. Cardiac Surgery consulted, plan for OR tomorrow with Dr. Church.   7/15 POD 0: OR with Dr. Church, urgent CABG x 3. Post op VICTORINA LV fn improved, improvement in inferior walls, EF 55%, no RWMA. Mild AI and mild MR. SHAKEEL not closed. 1 Plt and 1 Kcentra given in OR. Transferred to CVICU on sukhi, precedex, and insulin. Received 750mL albumin. Extubated at 1750.    POD 1: Received additional 750mL fluid by this morning, remains on Sukhi at 60. Nephrocheck elevated. Transition to norepi for BP support. Ongoing resuscitation this AM, repeat labs at 1pm. Decrease pain meds, per RN, very sedated with normal doses of oxycodone/dilaudid.     Subjective:   Patient seen with Dr. Choi, up in chair, 4L NC, HR in the 90s. He keeps repeating that he can not eat all his meal even though he does not have food in front of him at this time. Looks to be in pain, very upright and tight in his movements.      Objective:   Vitals:  Blood pressure (!) 91/56, pulse 87, temperature 99.1 °F (37.3 °C), temperature source Axillary, resp. rate 28, height 1.588 m (5' 2.52\"), weight 62.7 kg (138 lb 3.7 oz), SpO2 99 %.  Temp (24hrs), Av.1 °F (36.7 °C), Min:96.3 °F (35.7 °C), Max:100 °F (37.8 °C)        EKG/Rhythm:  HR 90s, unsure rhythm, fairly regular but varied WV interval, occasionally looks junctional; asked for repeat EKG    Oxygen Flow Rate: O2 Flow Rate (L/min): 4 L/min    Oxygen Delivery Method: O2 Device:

## 2024-07-17 ENCOUNTER — APPOINTMENT (OUTPATIENT)
Facility: HOSPITAL | Age: 84
End: 2024-07-17
Payer: MEDICARE

## 2024-07-17 LAB
ALBUMIN SERPL-MCNC: 3.7 G/DL (ref 3.5–5)
ALBUMIN/GLOB SERPL: 1.7 (ref 1.1–2.2)
ALP SERPL-CCNC: 33 U/L (ref 45–117)
ALT SERPL-CCNC: 18 U/L (ref 12–78)
ANION GAP SERPL CALC-SCNC: 8 MMOL/L (ref 5–15)
AST SERPL-CCNC: 86 U/L (ref 15–37)
BILIRUB SERPL-MCNC: 0.6 MG/DL (ref 0.2–1)
BUN SERPL-MCNC: 23 MG/DL (ref 6–20)
BUN/CREAT SERPL: 18 (ref 12–20)
CALCIUM SERPL-MCNC: 8.5 MG/DL (ref 8.5–10.1)
CHLORIDE SERPL-SCNC: 107 MMOL/L (ref 97–108)
CO2 SERPL-SCNC: 23 MMOL/L (ref 21–32)
CREAT SERPL-MCNC: 1.26 MG/DL (ref 0.7–1.3)
EKG ATRIAL RATE: 85 BPM
EKG DIAGNOSIS: NORMAL
EKG P AXIS: 58 DEGREES
EKG P-R INTERVAL: 142 MS
EKG Q-T INTERVAL: 340 MS
EKG QRS DURATION: 88 MS
EKG QTC CALCULATION (BAZETT): 404 MS
EKG R AXIS: -15 DEGREES
EKG T AXIS: -23 DEGREES
EKG VENTRICULAR RATE: 85 BPM
ERYTHROCYTE [DISTWIDTH] IN BLOOD BY AUTOMATED COUNT: 12.6 % (ref 11.5–14.5)
GLOBULIN SER CALC-MCNC: 2.2 G/DL (ref 2–4)
GLUCOSE BLD STRIP.AUTO-MCNC: 102 MG/DL (ref 65–117)
GLUCOSE BLD STRIP.AUTO-MCNC: 111 MG/DL (ref 65–117)
GLUCOSE BLD STRIP.AUTO-MCNC: 119 MG/DL (ref 65–117)
GLUCOSE BLD STRIP.AUTO-MCNC: 122 MG/DL (ref 65–117)
GLUCOSE BLD STRIP.AUTO-MCNC: 126 MG/DL (ref 65–117)
GLUCOSE BLD STRIP.AUTO-MCNC: 127 MG/DL (ref 65–117)
GLUCOSE BLD STRIP.AUTO-MCNC: 139 MG/DL (ref 65–117)
GLUCOSE BLD STRIP.AUTO-MCNC: 169 MG/DL (ref 65–117)
GLUCOSE BLD STRIP.AUTO-MCNC: 189 MG/DL (ref 65–117)
GLUCOSE BLD STRIP.AUTO-MCNC: 195 MG/DL (ref 65–117)
GLUCOSE BLD STRIP.AUTO-MCNC: 200 MG/DL (ref 65–117)
GLUCOSE BLD STRIP.AUTO-MCNC: 86 MG/DL (ref 65–117)
GLUCOSE BLD STRIP.AUTO-MCNC: 89 MG/DL (ref 65–117)
GLUCOSE BLD STRIP.AUTO-MCNC: 95 MG/DL (ref 65–117)
GLUCOSE SERPL-MCNC: 92 MG/DL (ref 65–100)
HCT VFR BLD AUTO: 22.9 % (ref 36.6–50.3)
HGB BLD-MCNC: 7.9 G/DL (ref 12.1–17)
LACTATE SERPL-SCNC: 1 MMOL/L (ref 0.4–2)
MAGNESIUM SERPL-MCNC: 2.2 MG/DL (ref 1.6–2.4)
MCH RBC QN AUTO: 31.9 PG (ref 26–34)
MCHC RBC AUTO-ENTMCNC: 34.5 G/DL (ref 30–36.5)
MCV RBC AUTO: 92.3 FL (ref 80–99)
NRBC # BLD: 0 K/UL (ref 0–0.01)
NRBC BLD-RTO: 0 PER 100 WBC
PLATELET # BLD AUTO: 109 K/UL (ref 150–400)
PMV BLD AUTO: 10 FL (ref 8.9–12.9)
POTASSIUM SERPL-SCNC: 3.8 MMOL/L (ref 3.5–5.1)
PROT SERPL-MCNC: 5.9 G/DL (ref 6.4–8.2)
RBC # BLD AUTO: 2.48 M/UL (ref 4.1–5.7)
SERVICE CMNT-IMP: ABNORMAL
SERVICE CMNT-IMP: NORMAL
SODIUM SERPL-SCNC: 138 MMOL/L (ref 136–145)
WBC # BLD AUTO: 10.8 K/UL (ref 4.1–11.1)

## 2024-07-17 PROCEDURE — 6370000000 HC RX 637 (ALT 250 FOR IP): Performed by: STUDENT IN AN ORGANIZED HEALTH CARE EDUCATION/TRAINING PROGRAM

## 2024-07-17 PROCEDURE — 94760 N-INVAS EAR/PLS OXIMETRY 1: CPT

## 2024-07-17 PROCEDURE — 6370000000 HC RX 637 (ALT 250 FOR IP): Performed by: ANESTHESIOLOGY

## 2024-07-17 PROCEDURE — 2580000003 HC RX 258: Performed by: PHYSICIAN ASSISTANT

## 2024-07-17 PROCEDURE — 2700000000 HC OXYGEN THERAPY PER DAY

## 2024-07-17 PROCEDURE — 6360000002 HC RX W HCPCS: Performed by: NURSE PRACTITIONER

## 2024-07-17 PROCEDURE — 6360000002 HC RX W HCPCS

## 2024-07-17 PROCEDURE — 2000000000 HC ICU R&B

## 2024-07-17 PROCEDURE — 85027 COMPLETE CBC AUTOMATED: CPT

## 2024-07-17 PROCEDURE — 97530 THERAPEUTIC ACTIVITIES: CPT

## 2024-07-17 PROCEDURE — 97535 SELF CARE MNGMENT TRAINING: CPT

## 2024-07-17 PROCEDURE — 71045 X-RAY EXAM CHEST 1 VIEW: CPT

## 2024-07-17 PROCEDURE — 93005 ELECTROCARDIOGRAM TRACING: CPT | Performed by: NURSE PRACTITIONER

## 2024-07-17 PROCEDURE — 6370000000 HC RX 637 (ALT 250 FOR IP): Performed by: NURSE PRACTITIONER

## 2024-07-17 PROCEDURE — 82962 GLUCOSE BLOOD TEST: CPT

## 2024-07-17 PROCEDURE — 97110 THERAPEUTIC EXERCISES: CPT

## 2024-07-17 PROCEDURE — 6370000000 HC RX 637 (ALT 250 FOR IP): Performed by: PHYSICIAN ASSISTANT

## 2024-07-17 PROCEDURE — 36415 COLL VENOUS BLD VENIPUNCTURE: CPT

## 2024-07-17 PROCEDURE — 6360000002 HC RX W HCPCS: Performed by: PHYSICIAN ASSISTANT

## 2024-07-17 PROCEDURE — 83735 ASSAY OF MAGNESIUM: CPT

## 2024-07-17 PROCEDURE — P9045 ALBUMIN (HUMAN), 5%, 250 ML: HCPCS

## 2024-07-17 PROCEDURE — 83605 ASSAY OF LACTIC ACID: CPT

## 2024-07-17 PROCEDURE — 97116 GAIT TRAINING THERAPY: CPT

## 2024-07-17 PROCEDURE — 80053 COMPREHEN METABOLIC PANEL: CPT

## 2024-07-17 RX ORDER — ALBUMIN, HUMAN INJ 5% 5 %
25 SOLUTION INTRAVENOUS ONCE
Status: COMPLETED | OUTPATIENT
Start: 2024-07-17 | End: 2024-07-17

## 2024-07-17 RX ORDER — HEPARIN SODIUM 5000 [USP'U]/ML
5000 INJECTION, SOLUTION INTRAVENOUS; SUBCUTANEOUS EVERY 8 HOURS SCHEDULED
Status: DISCONTINUED | OUTPATIENT
Start: 2024-07-17 | End: 2024-07-19 | Stop reason: HOSPADM

## 2024-07-17 RX ORDER — MAGNESIUM SULFATE IN WATER 40 MG/ML
2000 INJECTION, SOLUTION INTRAVENOUS ONCE
Status: COMPLETED | OUTPATIENT
Start: 2024-07-17 | End: 2024-07-17

## 2024-07-17 RX ORDER — HEPARIN SODIUM 5000 [USP'U]/ML
5000 INJECTION, SOLUTION INTRAVENOUS; SUBCUTANEOUS EVERY 8 HOURS SCHEDULED
Status: DISCONTINUED | OUTPATIENT
Start: 2024-07-17 | End: 2024-07-17

## 2024-07-17 RX ORDER — ALBUMIN, HUMAN INJ 5% 5 %
12.5 SOLUTION INTRAVENOUS ONCE
Status: COMPLETED | OUTPATIENT
Start: 2024-07-17 | End: 2024-07-17

## 2024-07-17 RX ADMIN — FAMOTIDINE 20 MG: 20 TABLET, FILM COATED ORAL at 08:39

## 2024-07-17 RX ADMIN — ALBUMIN (HUMAN) 12.5 G: 12.5 INJECTION, SOLUTION INTRAVENOUS at 16:08

## 2024-07-17 RX ADMIN — LACTULOSE 30 G: 20 SOLUTION ORAL at 18:32

## 2024-07-17 RX ADMIN — GABAPENTIN 100 MG: 100 CAPSULE ORAL at 08:38

## 2024-07-17 RX ADMIN — POLYETHYLENE GLYCOL 3350 17 G: 17 POWDER, FOR SOLUTION ORAL at 08:41

## 2024-07-17 RX ADMIN — SENNOSIDES AND DOCUSATE SODIUM 1 TABLET: 50; 8.6 TABLET ORAL at 20:39

## 2024-07-17 RX ADMIN — CHLORHEXIDINE GLUCONATE 15 ML: 1.2 RINSE ORAL at 20:31

## 2024-07-17 RX ADMIN — ALBUMIN (HUMAN) 25 G: 12.5 INJECTION, SOLUTION INTRAVENOUS at 08:44

## 2024-07-17 RX ADMIN — MUPIROCIN: 20 OINTMENT TOPICAL at 20:32

## 2024-07-17 RX ADMIN — MAGNESIUM SULFATE HEPTAHYDRATE 2000 MG: 40 INJECTION, SOLUTION INTRAVENOUS at 08:51

## 2024-07-17 RX ADMIN — SENNOSIDES AND DOCUSATE SODIUM 1 TABLET: 50; 8.6 TABLET ORAL at 08:40

## 2024-07-17 RX ADMIN — INSULIN LISPRO 1 UNITS: 100 INJECTION, SOLUTION INTRAVENOUS; SUBCUTANEOUS at 20:55

## 2024-07-17 RX ADMIN — ACETAMINOPHEN 975 MG: 325 TABLET ORAL at 18:29

## 2024-07-17 RX ADMIN — HYDROMORPHONE HYDROCHLORIDE 0.25 MG: 1 INJECTION, SOLUTION INTRAMUSCULAR; INTRAVENOUS; SUBCUTANEOUS at 04:27

## 2024-07-17 RX ADMIN — MUPIROCIN: 20 OINTMENT TOPICAL at 11:27

## 2024-07-17 RX ADMIN — METHOCARBAMOL 500 MG: 500 TABLET ORAL at 20:39

## 2024-07-17 RX ADMIN — Medication 1 DROP: at 20:39

## 2024-07-17 RX ADMIN — POTASSIUM CHLORIDE 20 MEQ: 29.8 INJECTION, SOLUTION INTRAVENOUS at 04:26

## 2024-07-17 RX ADMIN — Medication 3 MG: at 20:39

## 2024-07-17 RX ADMIN — ACETAMINOPHEN 975 MG: 325 TABLET ORAL at 14:06

## 2024-07-17 RX ADMIN — OXYCODONE 2.5 MG: 5 TABLET ORAL at 08:35

## 2024-07-17 RX ADMIN — ASPIRIN 81 MG: 81 TABLET, COATED ORAL at 08:39

## 2024-07-17 RX ADMIN — MAGNESIUM HYDROXIDE 30 ML: 400 SUSPENSION ORAL at 14:50

## 2024-07-17 RX ADMIN — SODIUM CHLORIDE, PRESERVATIVE FREE 10 ML: 5 INJECTION INTRAVENOUS at 20:31

## 2024-07-17 RX ADMIN — MAGNESIUM OXIDE TAB 400 MG (241.3 MG ELEMENTAL MG) 400 MG: 400 (241.3 MG) TAB at 20:39

## 2024-07-17 RX ADMIN — HEPARIN SODIUM 5000 UNITS: 5000 INJECTION INTRAVENOUS; SUBCUTANEOUS at 22:14

## 2024-07-17 RX ADMIN — MAGNESIUM OXIDE TAB 400 MG (241.3 MG ELEMENTAL MG) 400 MG: 400 (241.3 MG) TAB at 08:39

## 2024-07-17 RX ADMIN — METHOCARBAMOL 500 MG: 500 TABLET ORAL at 08:38

## 2024-07-17 RX ADMIN — ROSUVASTATIN 40 MG: 40 TABLET, FILM COATED ORAL at 08:40

## 2024-07-17 RX ADMIN — HEPARIN SODIUM 5000 UNITS: 5000 INJECTION INTRAVENOUS; SUBCUTANEOUS at 15:55

## 2024-07-17 RX ADMIN — ALBUMIN (HUMAN) 12.5 G: 12.5 INJECTION, SOLUTION INTRAVENOUS at 11:39

## 2024-07-17 RX ADMIN — INSULIN GLARGINE 18 UNITS: 100 INJECTION, SOLUTION SUBCUTANEOUS at 14:17

## 2024-07-17 RX ADMIN — GABAPENTIN 100 MG: 100 CAPSULE ORAL at 20:39

## 2024-07-17 RX ADMIN — HEPARIN SODIUM 5000 UNITS: 5000 INJECTION INTRAVENOUS; SUBCUTANEOUS at 11:26

## 2024-07-17 RX ADMIN — WATER 2000 MG: 1 INJECTION INTRAMUSCULAR; INTRAVENOUS; SUBCUTANEOUS at 02:58

## 2024-07-17 RX ADMIN — ACETAMINOPHEN 975 MG: 325 TABLET ORAL at 23:58

## 2024-07-17 ASSESSMENT — PAIN SCALES - GENERAL
PAINLEVEL_OUTOF10: 8
PAINLEVEL_OUTOF10: 2
PAINLEVEL_OUTOF10: 0
PAINLEVEL_OUTOF10: 3
PAINLEVEL_OUTOF10: 4
PAINLEVEL_OUTOF10: 4
PAINLEVEL_OUTOF10: 3

## 2024-07-17 ASSESSMENT — PAIN DESCRIPTION - LOCATION
LOCATION: CHEST

## 2024-07-17 ASSESSMENT — PAIN DESCRIPTION - DESCRIPTORS
DESCRIPTORS: ACHING

## 2024-07-17 ASSESSMENT — PAIN DESCRIPTION - ORIENTATION
ORIENTATION: MID
ORIENTATION: ANTERIOR

## 2024-07-17 NOTE — PROGRESS NOTES
0900: Levo off, aguilar out    1145: Chest tubes removed by NP    1330: Central line and a line out    1600: Insulin gtt off. BP on low side, MAP 64. Spoke with provider, order received to give one albumen    2000: Bedside shift change report given to Yanira RN (oncoming nurse) by Alison GRULLON (offgoing nurse). Report included the following information Nurse Handoff Report, Intake/Output, MAR, and Recent Results.

## 2024-07-17 NOTE — PROCEDURES
PROCEDURE NOTE  Date: 7/17/2024   Name: Matthew Puente  YOB: 1940    Procedures        Pleurevac to water seal, no air leak present with deep breathing by patient. Chest tube sites cleansed with alcohol. Sutures cut and removed. Chest tubes discontinued on exhalation. Occlusive dressing applied. Patient tolerated procedure well.

## 2024-07-17 NOTE — PROGRESS NOTES
Cardiac Surgery ICU Progress Note    Admit Date: 7/13/2024  POD:  2 Days Post-Op    Procedure:    7/15/24 with Dr. Church:   CORONARY ARTERY BYPASS GRAFT x3, CPB, RIGHT LEG SVEVH,   GARCIA to LAD  SVG to int tiffany  SVG to FELECIA   VICTORINA AND EPI AORTIC U/S BY DR GOMEZ    [facility] Synopsis:  7/13: Admitted to ED with substernal chest pressure. Troponin positive. Underwent LHC with Dr. Walters which revealed MVD. He was admitted and started on heparin infusion  7/14: Troponin continues to rise, started on nitroglycerin infusion. Cardiac Surgery consulted, plan for OR tomorrow with Dr. Church.   7/15 POD 0: OR with Dr. Church, urgent CABG x 3. Post op VICTORINA LV fn improved, improvement in inferior walls, EF 55%, no RWMA. Mild AI and mild MR. SHAKEEL not closed. 1 Plt and 1 Kcentra given in OR. Transferred to CVICU on sukhi, precedex, and insulin. Received 750mL albumin. Extubated at 1750.   7/16 POD 1: Albumin 750mL. Sukhi at 60. Nephrocheck elevated. Transition to norepi for BP support. Decrease pain meds, per RN, very sedated with normal doses of oxycodone/dilaudid.  7/17 POD2: Albumin 1L. Weaned off norepi. Deline. D/c chest tubes.     Subjective:   Patient seen with Dr. Choi, up in chair, 2L NC, SR 90, slightly tachypnic. He was a little paranoid overnight, otherwise no confusion.       Objective:   Vitals:        EKG/Rhythm:  SR 90    Oxygen Flow Rate: O2 Flow Rate (L/min): 2 L/min    Oxygen Delivery Method: O2 Device: Nasal cannula    Extubation Date / Time: 7/15 at 1750    CT Output (2 med, 1 L Pleural):   AV wires (cut when appropriate):     CXR: 7/17 on left, 7/16 on right per ride increased atelectasis, widened mediastinum    Xray Result (most recent):  XR CHEST PORTABLE 07/17/2024    Narrative  EXAM:  XR CHEST PORTABLE    INDICATION: Post op open heart surgery    COMPARISON: 7/16/2024    TECHNIQUE: 0422 hours portable chest AP view    FINDINGS: Right IJ catheter overlies the SVC. Mediastinal and pleural  chest tubes  - Will hold plavix while on asa and heparin SQ   - deline, will likely place transfer orders this afternoon    Acute Post-Operative Hypotension: r/t hypovolemia and vasoplegic shock  Improved. Weaned off norepi this am.  - Still appears to be hypovolemic, MAP around 65, CVP 3, robust urine output - albumin 750 ml so far this morning     Acute HFpEF: EF normal, noted severe hypokinesis of basal inferolateral, mid inferior and inferolateral. Abnormal diastolic function  Improved. 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 Above transfusion threshold.   - CBC daily  - initiate ferrous sulfate x 1 month for hct < 30% following BM     Acute Post-Op Thrombocytopenia: Received 1 unit Plt in OR  - Cont to monitor cbc    Acute Post-Operative Pain Management:  - Continue scheduled Tylenol, reduce scheduled gabapentin to 100mg BID  - Reduce PRN Oxycodone to 2.5mg and 5mg  - PRN IV dilaudid to .25mg   - Add scheduled Robaxin 500mg TID     Glaucoma:   - Resume Refresh Plush drops, both eyes BID  - Follow up on dosing of Xalatan eye drops    Risk for Delirium:  - Pre-operatively, patient made a few comments that made me have concerns for some mild dementia  - Patient is showing some signs of slow neurological assessment this AM, no weakness - improved today  - Continue to monitor, high risk for ICU delirium     Pain regimen: scheduled tylenol, lidocaine patches, prn oxy and dilaudid  Fluid and electrolytes: Maintain K+ >4 and Mg level

## 2024-07-17 NOTE — PROGRESS NOTES
Spiritual Care Assessment/Progress Note  [facility]    Name: Matthew Puente MRN: [id number]    Age: 83 y.o.     Sex: male   Language: English     Date: 7/17/2024            Total Time Calculated: 24 min              Spiritual Assessment begun in [facility]  Service Provided For: Patient and family together  Referral/Consult From: Rounding  Encounter Overview/Reason: Initial Encounter    Spiritual beliefs:      [x] Involved in a anay tradition/spiritual practice: PT; Bahai     [] Supported by a anay community:      [] Claims no spiritual orientation:      [] Seeking spiritual identity:           [] Adheres to an individual form of spirituality:      [] Not able to assess:                Identified resources for coping and support system:   Support System: Family members       [] Prayer                  [] Devotional reading               [] Music                  [] Guided Imagery     [] Pet visits                                        [] Other: (COMMENT)     Specific area/focus of visit   Encounter:    Crisis:    Spiritual/Emotional needs: Type: Spiritual Support  Ritual, Rites and Sacraments:    Grief, Loss, and Adjustments: Type: Life Adjustments, Adjustment to illness  Ethics/Mediation:    Behavioral Health:    Palliative Care:    Advance Care Planning:           Narrative:   Referral source:  initiated visit to Matthew Puente at [facility] in [facility]. I reviewed the medical record prior to this encounter.     Spiritual Assessment:      Matthew Puente was sitting up in his chair with his eyes closed. His son Stephanie and daughter, Bethanie were present at bedside. Assessment was conducted with son and daughter as they shared that Mr. Castro has been a little confused since his surgery on Monday.  Mr. Castro is a  of 5 years ,and has 3 children. He is Bahai, however his children are Mosque. Intro to spiritual health; provided ministry of [facility],

## 2024-07-17 NOTE — PROGRESS NOTES
Cardiac Surgery Care Coordinator- Met with Matthew Puente, reviewed plan of care and discussed potential discharge date.  Reinforced sternal precautions and encouraged continued use of the incentive spirometer. Will continue to follow for educational and emotional needs.

## 2024-07-17 NOTE — PROGRESS NOTES
Critical Care Medicine Plan of Care    Pt with expected post op progression, weaning off vasopressors w/ fluid resuscitation. Beginning to have confusion concerning for ICU delirium, recommend strick day/night cycles and delirium precautions. PRN haldol ordered. Dispo per primary. Critical Care will follow peripherally at this time while they remain in CVICU. Please do not hesitate to contact Intensivist service if there is a clinical question, or if pt with acute change in clinical status.     Miguel Smith MD  Staff Intensivist  [facility]

## 2024-07-17 NOTE — PROGRESS NOTES
1930: Bedside and Verbal shift change report given to MITCHEL Rosas (oncoming nurse) by MITCHEL Danielson (offgoing nurse). Report included the following information Nurse Handoff Report, Intake/Output, MAR, Recent Results, and Cardiac Rhythm NSR .      0426: K+ 3.8- Replaced per protocol     0700: Pt refusing to get OOB to chair and refusing any oral pain medication at this time. Educated Pt on importance of getting OOB and keeping pain under control.     0730: Bedside and Verbal shift change report given to MITCHEL Doyle (oncoming nurse) by MITCHEL Rosas (offgoing nurse). Report included the following information Nurse Handoff Report, Intake/Output, MAR, Recent Results, and Cardiac Rhythm NSR .

## 2024-07-17 NOTE — PLAN OF CARE
flexion.    Pain Ratin/10 chest/incisional pain      Activity Tolerance:   Fair  and requires rest breaks  Please refer to the flowsheet for vital signs taken during this treatment.    After treatment:   Patient left in no apparent distress sitting up in chair, Call bell within reach, Bed/ chair alarm activated, and Caregiver / family present    COMMUNICATION/EDUCATION:   The patient's plan of care was discussed with: physical therapist and registered nurse    Patient Education  Education Given To: Patient  Education Provided: Role of Therapy;Precautions;Plan of Care;Transfer Training;Mobility Training;Fall Prevention Strategies;ADL Adaptive Strategies;Orientation  Education Provided Comments: Move in the tube  Education Method: Verbal;Demonstration;Teach Back  Barriers to Learning: Cognition;Hearing;Vision  Education Outcome: Continued education needed    Thank you for this referral.  Megan Riddle [facility]  Minutes: 40    Regarding student involvement in patient care:  A student participated in this treatment session. Per CMS Medicare statements and AOTA guidelines I certify that the following was true:  1. I was present and directly observed the entire session.  2. I made all skilled judgments and clinical decisions regarding care.  3. I am the practitioner responsible for assessment, treatment, and documentation.

## 2024-07-18 ENCOUNTER — APPOINTMENT (OUTPATIENT)
Facility: HOSPITAL | Age: 84
End: 2024-07-18
Payer: MEDICARE

## 2024-07-18 LAB
ALBUMIN SERPL-MCNC: 4.4 G/DL (ref 3.5–5)
ALBUMIN/GLOB SERPL: 1.5 (ref 1.1–2.2)
ALP SERPL-CCNC: 46 U/L (ref 45–117)
ALT SERPL-CCNC: 23 U/L (ref 12–78)
ANION GAP SERPL CALC-SCNC: 4 MMOL/L (ref 5–15)
AST SERPL-CCNC: 60 U/L (ref 15–37)
BILIRUB SERPL-MCNC: 0.8 MG/DL (ref 0.2–1)
BUN SERPL-MCNC: 28 MG/DL (ref 6–20)
BUN/CREAT SERPL: 24 (ref 12–20)
CALCIUM SERPL-MCNC: 9.1 MG/DL (ref 8.5–10.1)
CHLORIDE SERPL-SCNC: 107 MMOL/L (ref 97–108)
CO2 SERPL-SCNC: 25 MMOL/L (ref 21–32)
CREAT SERPL-MCNC: 1.17 MG/DL (ref 0.7–1.3)
ERYTHROCYTE [DISTWIDTH] IN BLOOD BY AUTOMATED COUNT: 12.7 % (ref 11.5–14.5)
GLOBULIN SER CALC-MCNC: 3 G/DL (ref 2–4)
GLUCOSE BLD STRIP.AUTO-MCNC: 103 MG/DL (ref 65–117)
GLUCOSE BLD STRIP.AUTO-MCNC: 117 MG/DL (ref 65–117)
GLUCOSE BLD STRIP.AUTO-MCNC: 126 MG/DL (ref 65–117)
GLUCOSE BLD STRIP.AUTO-MCNC: 176 MG/DL (ref 65–117)
GLUCOSE SERPL-MCNC: 118 MG/DL (ref 65–100)
HCT VFR BLD AUTO: 26.4 % (ref 36.6–50.3)
HGB BLD-MCNC: 9 G/DL (ref 12.1–17)
MAGNESIUM SERPL-MCNC: 3 MG/DL (ref 1.6–2.4)
MCH RBC QN AUTO: 32 PG (ref 26–34)
MCHC RBC AUTO-ENTMCNC: 34.1 G/DL (ref 30–36.5)
MCV RBC AUTO: 94 FL (ref 80–99)
NRBC # BLD: 0 K/UL (ref 0–0.01)
NRBC BLD-RTO: 0 PER 100 WBC
PLATELET # BLD AUTO: 128 K/UL (ref 150–400)
PMV BLD AUTO: 9.9 FL (ref 8.9–12.9)
POTASSIUM SERPL-SCNC: 4.3 MMOL/L (ref 3.5–5.1)
PROT SERPL-MCNC: 7.4 G/DL (ref 6.4–8.2)
RBC # BLD AUTO: 2.81 M/UL (ref 4.1–5.7)
SERVICE CMNT-IMP: ABNORMAL
SERVICE CMNT-IMP: ABNORMAL
SERVICE CMNT-IMP: NORMAL
SERVICE CMNT-IMP: NORMAL
SODIUM SERPL-SCNC: 136 MMOL/L (ref 136–145)
WBC # BLD AUTO: 10.2 K/UL (ref 4.1–11.1)

## 2024-07-18 PROCEDURE — 6370000000 HC RX 637 (ALT 250 FOR IP): Performed by: PHYSICIAN ASSISTANT

## 2024-07-18 PROCEDURE — 6370000000 HC RX 637 (ALT 250 FOR IP): Performed by: NURSE PRACTITIONER

## 2024-07-18 PROCEDURE — 97530 THERAPEUTIC ACTIVITIES: CPT

## 2024-07-18 PROCEDURE — 83735 ASSAY OF MAGNESIUM: CPT

## 2024-07-18 PROCEDURE — 2060000000 HC ICU INTERMEDIATE R&B

## 2024-07-18 PROCEDURE — 6360000002 HC RX W HCPCS

## 2024-07-18 PROCEDURE — 94640 AIRWAY INHALATION TREATMENT: CPT

## 2024-07-18 PROCEDURE — 85027 COMPLETE CBC AUTOMATED: CPT

## 2024-07-18 PROCEDURE — 2580000003 HC RX 258: Performed by: PHYSICIAN ASSISTANT

## 2024-07-18 PROCEDURE — 97535 SELF CARE MNGMENT TRAINING: CPT

## 2024-07-18 PROCEDURE — 71045 X-RAY EXAM CHEST 1 VIEW: CPT

## 2024-07-18 PROCEDURE — 80053 COMPREHEN METABOLIC PANEL: CPT

## 2024-07-18 PROCEDURE — 97116 GAIT TRAINING THERAPY: CPT

## 2024-07-18 PROCEDURE — 94760 N-INVAS EAR/PLS OXIMETRY 1: CPT

## 2024-07-18 PROCEDURE — 2700000000 HC OXYGEN THERAPY PER DAY

## 2024-07-18 PROCEDURE — 82962 GLUCOSE BLOOD TEST: CPT

## 2024-07-18 PROCEDURE — 36415 COLL VENOUS BLD VENIPUNCTURE: CPT

## 2024-07-18 RX ADMIN — SENNOSIDES AND DOCUSATE SODIUM 1 TABLET: 50; 8.6 TABLET ORAL at 08:38

## 2024-07-18 RX ADMIN — CHLORHEXIDINE GLUCONATE 15 ML: 1.2 RINSE ORAL at 08:40

## 2024-07-18 RX ADMIN — MUPIROCIN: 20 OINTMENT TOPICAL at 20:31

## 2024-07-18 RX ADMIN — MUPIROCIN: 20 OINTMENT TOPICAL at 08:40

## 2024-07-18 RX ADMIN — Medication 1 DROP: at 20:29

## 2024-07-18 RX ADMIN — ACETAMINOPHEN 975 MG: 325 TABLET ORAL at 17:37

## 2024-07-18 RX ADMIN — ACETAMINOPHEN 975 MG: 325 TABLET ORAL at 22:02

## 2024-07-18 RX ADMIN — SODIUM CHLORIDE, PRESERVATIVE FREE 10 ML: 5 INJECTION INTRAVENOUS at 08:41

## 2024-07-18 RX ADMIN — IPRATROPIUM BROMIDE AND ALBUTEROL SULFATE 1 DOSE: .5; 3 SOLUTION RESPIRATORY (INHALATION) at 00:05

## 2024-07-18 RX ADMIN — SODIUM CHLORIDE, PRESERVATIVE FREE 10 ML: 5 INJECTION INTRAVENOUS at 20:30

## 2024-07-18 RX ADMIN — GABAPENTIN 100 MG: 100 CAPSULE ORAL at 20:31

## 2024-07-18 RX ADMIN — GABAPENTIN 100 MG: 100 CAPSULE ORAL at 08:38

## 2024-07-18 RX ADMIN — HEPARIN SODIUM 5000 UNITS: 5000 INJECTION INTRAVENOUS; SUBCUTANEOUS at 13:20

## 2024-07-18 RX ADMIN — HEPARIN SODIUM 5000 UNITS: 5000 INJECTION INTRAVENOUS; SUBCUTANEOUS at 07:13

## 2024-07-18 RX ADMIN — INSULIN LISPRO 1 UNITS: 100 INJECTION, SOLUTION INTRAVENOUS; SUBCUTANEOUS at 20:44

## 2024-07-18 RX ADMIN — ASPIRIN 81 MG: 81 TABLET, COATED ORAL at 08:39

## 2024-07-18 RX ADMIN — MAGNESIUM OXIDE TAB 400 MG (241.3 MG ELEMENTAL MG) 400 MG: 400 (241.3 MG) TAB at 08:38

## 2024-07-18 RX ADMIN — ROSUVASTATIN 40 MG: 40 TABLET, FILM COATED ORAL at 08:38

## 2024-07-18 RX ADMIN — CHLORHEXIDINE GLUCONATE 15 ML: 1.2 RINSE ORAL at 20:31

## 2024-07-18 RX ADMIN — FAMOTIDINE 20 MG: 20 TABLET, FILM COATED ORAL at 08:39

## 2024-07-18 RX ADMIN — METHOCARBAMOL 500 MG: 500 TABLET ORAL at 08:38

## 2024-07-18 RX ADMIN — METHOCARBAMOL 500 MG: 500 TABLET ORAL at 20:31

## 2024-07-18 RX ADMIN — ACETAMINOPHEN 975 MG: 325 TABLET ORAL at 07:14

## 2024-07-18 RX ADMIN — HEPARIN SODIUM 5000 UNITS: 5000 INJECTION INTRAVENOUS; SUBCUTANEOUS at 20:46

## 2024-07-18 RX ADMIN — POLYETHYLENE GLYCOL 3350 17 G: 17 POWDER, FOR SOLUTION ORAL at 08:38

## 2024-07-18 RX ADMIN — MAGNESIUM OXIDE TAB 400 MG (241.3 MG ELEMENTAL MG) 400 MG: 400 (241.3 MG) TAB at 20:31

## 2024-07-18 ASSESSMENT — PAIN SCALES - GENERAL
PAINLEVEL_OUTOF10: 3
PAINLEVEL_OUTOF10: 4
PAINLEVEL_OUTOF10: 0

## 2024-07-18 ASSESSMENT — PAIN DESCRIPTION - ORIENTATION: ORIENTATION: ANTERIOR

## 2024-07-18 ASSESSMENT — PAIN DESCRIPTION - DESCRIPTORS: DESCRIPTORS: ACHING

## 2024-07-18 ASSESSMENT — PAIN DESCRIPTION - LOCATION
LOCATION: CHEST
LOCATION: CHEST

## 2024-07-18 NOTE — PLAN OF CARE
Problem: Occupational Therapy - Adult  Goal: By Discharge: Performs self-care activities at highest level of function for planned discharge setting.  See evaluation for individualized goals.  Description: FUNCTIONAL STATUS PRIOR TO ADMISSION: PTA patient was living alone and IND with ADLs/functional mobility. Hx of glaucoma with restricted daytime driving, family assists PRN, also utilizes Uber/Ly[employer] services PRN. Has supportive children who are able to assist (potential for staying with children upon d/c pending progress)    Occupational Therapy Goals:  Initiated 7/16/2024    1.  Patient will perform ADLs standing 5 mins without fatigue or LOB with Minimal Assist within 7 days.  2.  Patient will perform upper body ADLs with Minimal Assist within 7 days.  3.  Patient will perform lower body ADLs with Moderate Assist and AE PRN within 7 days.    4.  Patient will perform toilet transfers with Moderate Assist to [facility] within 7 days.  5.  Patient will perform all aspects of toileting with Moderate Assist within 7 days.  6.  Patient will participate in seated cardiac/sternal upper extremity therapeutic exercise/activities to increase independence with ADLs with Minimal Assist for 5 minutes within 7 days.   7.  Patient will adhere to Move in the Tube precautions during functional tasks with verbal, visual, tactile cues within 7 days.     Outcome: Progressing     OCCUPATIONAL THERAPY TREATMENT  Patient: Matthew Puente (83 y.o. male)  Date: 7/18/2024  Primary Diagnosis: Chest pain [R07.9]  NSTEMI (non-ST elevated myocardial infarction) (HCC) [I21.4]  Procedure(s) (LRB):  CORONARY ARTERY BYPASS GRAFT x3, CPB, RIGHT LEG SVEVH, LIMA, VICTORINA AND EPI AORTIC U/S BY DR GOMEZ (N/A) 3 Days Post-Op   Precautions: Fall Risk, Surgical protocol (move in the tube)                Chart, occupational therapy assessment, plan of care, and goals were reviewed.    ASSESSMENT  Patient continues to benefit from skilled OT services and is slowly

## 2024-07-18 NOTE — PLAN OF CARE
Problem: Physical Therapy - Adult  Goal: By Discharge: Performs mobility at highest level of function for planned discharge setting.  See evaluation for individualized goals.  Description: FUNCTIONAL STATUS PRIOR TO ADMISSION: Patient was independent and active without use of DME. Denies hx falls. Lives alone but has many supportive family members in area. Hx of significant glaucoma with visual impairment, reports he does continue to drive on a restricted license (only short distances during daylight hours), otherwise takes ride-share options. Per chart, either family will stay with patient or he may be able to stay with family at discharge.     HOME SUPPORT PRIOR TO ADMISSION: The patient lived alone with local family to provide assistance.    Physical Therapy Goals  Initiated 7/16/2024  1.  Patient will move from supine to sit and sit to supine in bed with minimal assistance within 5 day(s).    2.  Patient will perform sit to stand with minimal assistance within 5 day(s).  3.  Patient will transfer from bed to chair and chair to bed with minimal assistance using the least restrictive device within 5 day(s).  4.  Patient will ambulate with minimal assistance for 30 feet with the least restrictive device within 5 day(s).   5.  Patient will ascend/descend 3 stairs with one handrail(s) with minimal assistance within 5 day(s).  6.  Patient will perform cardiac exercises per protocol with contact guard assist within 5 days.  7.  Patient will verbally recall and functionally demonstrate mindful-based movements (\"move in the tube\") principles without cues within 5 days.    Outcome: Progressing   PHYSICAL THERAPY TREATMENT    Patient: Matthew Puente (83 y.o. male)  Date: 7/18/2024  Diagnosis: Chest pain [R07.9]  NSTEMI (non-ST elevated myocardial infarction) (HCC) [I21.4] NSTEMI (non-ST elevated myocardial infarction) (HCC)  Procedure(s) (LRB):  CORONARY ARTERY BYPASS GRAFT x3, CPB, RIGHT LEG SVEVH, LIMA, VICTORINA AND EPI

## 2024-07-18 NOTE — DIABETES MGMT
This 83 year old  male was admitted 7/13/24 from ER after experiencing substernal chest pain with radiation to the mid-back. Studies revealed multi-vessel CAD and patient underwent CABG 7/15/24. Recovering without incident. Patient does not have diabetes but there is a family history of diabetes. Bgs were easily managed with an insulin infusion per protocol X48 hours. Transitioned off insulin infusion 7/17/24. .    Blood glucose pattern:      Plan: Corrective insulin as indicated.    No further intervention required.    Signing off.    No charge.    Angie Barrera DNP, RN, ACNS-BC, BC-ADM, [facility]  Clinical Nurse Specialist-Diabetes & Endocrine disorders    [facility] (In-patient CNS consult service)  (o) [phone]

## 2024-07-18 NOTE — PROGRESS NOTES
Cardiac Surgery Care Coordinator- Met with Matthew Puente, reviewed plan of care and discussed potential discharge plan. Reinforced sternal precautions and encouraged continued use of the incentive spirometer. Reviewed goals for the day and emphasized the importance of increased activity to meet discharge goals.

## 2024-07-18 NOTE — PROGRESS NOTES
1930: Bedside and Verbal shift change report given to MITCHEL Rosas (oncoming nurse) by MITCHEL Doyle (offgoing nurse). Report included the following information Nurse Handoff Report, Intake/Output, MAR, Recent Results, and Cardiac Rhythm NSR .      Shift Summary: Pt had BM overnight; vitals stable    0730: Bedside and Verbal shift change report given to MITCHEL Doyle (oncoming nurse) by MITCHEL Rosas (offgoing nurse). Report included the following information Nurse Handoff Report, Intake/Output, MAR, Recent Results, and Cardiac Rhythm NSR .

## 2024-07-18 NOTE — CARE COORDINATION
Transition of Care Plan:    RUR: 15%  Prior Level of Functioning: Lives alone, local family, ind and no DME.   Disposition: (Anticipated d/c 7/18) [facility] accepted  If SNF or IPR: Date FOC offered: 7/18  Date FOC received: 7/18  Accepting facility: [facility]  Date authorization started with reference number: NA  Follow up appointments: Specialist  DME needed: Defer to IPR  Transportation at discharge: Stretcher  /IMM Medicare/ letter given: 7/16  Caregiver Contact: DaughterBethanie [phone]  Discharge Caregiver contacted prior to discharge?   Care Conference needed?   Barriers to discharge:  Medical    During rounds therapy reported IPR is recommended and patient and family would like [facility].  CM attempted to meet w patient to confirm but he was speaking w a provider.  CM will follow up later.     IPR referral in [facility].    Update: Spoke w patient, his son and daughter at bedside to provide education on IPR and they confirmed choice w [facility].  [facility] has accepted and patient was made aware.     ELENA Shaikh CCM  Care Management

## 2024-07-18 NOTE — PROGRESS NOTES
Cardiac Surgery ICU Progress Note    Admit Date: 7/13/2024  POD:  3 Days Post-Op    Procedure:    7/15/24 with Dr. Church:   CORONARY ARTERY BYPASS GRAFT x3, CPB, RIGHT LEG SVEVH,   GARCIA to LAD  SVG to int tiffany  SVG to FELECIA   VICTORINA AND EPI AORTIC U/S BY DR GOMEZ    [facility] Synopsis:  7/13: Admitted to ED with substernal chest pressure. Troponin positive. Underwent LHC with Dr. Walters which revealed MVD. He was admitted and started on heparin infusion  7/14: Troponin continues to rise, started on nitroglycerin infusion. Cardiac Surgery consulted, plan for OR tomorrow with Dr. Church.   7/15 POD 0: OR with Dr. Church, urgent CABG x 3. Post op VICTORINA LV fn improved, improvement in inferior walls, EF 55%, no RWMA. Mild AI and mild MR. SHAKEEL not closed. 1 Plt and 1 Kcentra given in OR. Transferred to CVICU on sukhi, precedex, and insulin. Received 750mL albumin. Extubated at 1750.   7/16 POD 1: Albumin 750mL. Sukhi at 60. Nephrocheck elevated. Transition to norepi for BP support. Decrease pain meds, per RN, very sedated with normal doses of oxycodone/dilaudid.  7/17 POD2: Albumin 1L. Weaned off norepi. Deline. D/c chest tubes.  7/18 POD 3: BP stable, transfer orders placed for stepdown. Monitor BP, weight up and would like to diurese, but BP not ready yet this AM.      Subjective:   Patient seen with Dr. Choi, up in chair, 2L NC, SR 70s. No acute complaints, states slept better last night.     Objective:   Vitals:      EKG/Rhythm:  SR 80    Oxygen Flow Rate: O2 Flow Rate (L/min): 2 L/min    Oxygen Delivery Method: O2 Device: Nasal cannula    Extubation Date / Time: 7/15 at 1750    CT Output (2 med, 1 L Pleural): CT pulled 7/17/24  AV wires (cut when appropriate):     CXR: 7/18      Xray Result (most recent):  XR CHEST PORTABLE 07/18/2024    Narrative  EXAM: XR CHEST PORTABLE    DATE: 7/18/2024 5:08 AM    INDICATION: post CT pull    COMPARISON: 7/17/2024    TECHNIQUE: A portable AP radiograph of the chest was

## 2024-07-19 ENCOUNTER — APPOINTMENT (OUTPATIENT)
Facility: HOSPITAL | Age: 84
End: 2024-07-19
Payer: MEDICARE

## 2024-07-19 VITALS
DIASTOLIC BLOOD PRESSURE: 60 MMHG | TEMPERATURE: 98.4 F | HEIGHT: 63 IN | HEART RATE: 84 BPM | WEIGHT: 148.3 LBS | OXYGEN SATURATION: 99 % | SYSTOLIC BLOOD PRESSURE: 128 MMHG | RESPIRATION RATE: 18 BRPM | BODY MASS INDEX: 26.28 KG/M2

## 2024-07-19 LAB
ALBUMIN SERPL-MCNC: 3.7 G/DL (ref 3.5–5)
ALBUMIN/GLOB SERPL: 1.2 (ref 1.1–2.2)
ALP SERPL-CCNC: 46 U/L (ref 45–117)
ALT SERPL-CCNC: 30 U/L (ref 12–78)
ANION GAP SERPL CALC-SCNC: 7 MMOL/L (ref 5–15)
AST SERPL-CCNC: 52 U/L (ref 15–37)
BILIRUB SERPL-MCNC: 0.7 MG/DL (ref 0.2–1)
BUN SERPL-MCNC: 26 MG/DL (ref 6–20)
BUN/CREAT SERPL: 27 (ref 12–20)
CALCIUM SERPL-MCNC: 9.1 MG/DL (ref 8.5–10.1)
CHLORIDE SERPL-SCNC: 106 MMOL/L (ref 97–108)
CO2 SERPL-SCNC: 25 MMOL/L (ref 21–32)
CREAT SERPL-MCNC: 0.97 MG/DL (ref 0.7–1.3)
ERYTHROCYTE [DISTWIDTH] IN BLOOD BY AUTOMATED COUNT: 12.4 % (ref 11.5–14.5)
GLOBULIN SER CALC-MCNC: 3.1 G/DL (ref 2–4)
GLUCOSE BLD STRIP.AUTO-MCNC: 102 MG/DL (ref 65–117)
GLUCOSE BLD STRIP.AUTO-MCNC: 129 MG/DL (ref 65–117)
GLUCOSE SERPL-MCNC: 109 MG/DL (ref 65–100)
HCT VFR BLD AUTO: 27.8 % (ref 36.6–50.3)
HGB BLD-MCNC: 9 G/DL (ref 12.1–17)
MAGNESIUM SERPL-MCNC: 2.6 MG/DL (ref 1.6–2.4)
MCH RBC QN AUTO: 30.9 PG (ref 26–34)
MCHC RBC AUTO-ENTMCNC: 32.4 G/DL (ref 30–36.5)
MCV RBC AUTO: 95.5 FL (ref 80–99)
NRBC # BLD: 0 K/UL (ref 0–0.01)
NRBC BLD-RTO: 0 PER 100 WBC
PLATELET # BLD AUTO: 156 K/UL (ref 150–400)
PMV BLD AUTO: 10.1 FL (ref 8.9–12.9)
POTASSIUM SERPL-SCNC: 4.1 MMOL/L (ref 3.5–5.1)
PROT SERPL-MCNC: 6.8 G/DL (ref 6.4–8.2)
RBC # BLD AUTO: 2.91 M/UL (ref 4.1–5.7)
SERVICE CMNT-IMP: ABNORMAL
SERVICE CMNT-IMP: NORMAL
SODIUM SERPL-SCNC: 138 MMOL/L (ref 136–145)
WBC # BLD AUTO: 8.9 K/UL (ref 4.1–11.1)

## 2024-07-19 PROCEDURE — 6370000000 HC RX 637 (ALT 250 FOR IP): Performed by: NURSE PRACTITIONER

## 2024-07-19 PROCEDURE — 6360000002 HC RX W HCPCS: Performed by: NURSE PRACTITIONER

## 2024-07-19 PROCEDURE — 2580000003 HC RX 258: Performed by: NURSE PRACTITIONER

## 2024-07-19 PROCEDURE — 85027 COMPLETE CBC AUTOMATED: CPT

## 2024-07-19 PROCEDURE — 82962 GLUCOSE BLOOD TEST: CPT

## 2024-07-19 PROCEDURE — 83735 ASSAY OF MAGNESIUM: CPT

## 2024-07-19 PROCEDURE — 36415 COLL VENOUS BLD VENIPUNCTURE: CPT

## 2024-07-19 PROCEDURE — 97530 THERAPEUTIC ACTIVITIES: CPT

## 2024-07-19 PROCEDURE — 80053 COMPREHEN METABOLIC PANEL: CPT

## 2024-07-19 PROCEDURE — 71046 X-RAY EXAM CHEST 2 VIEWS: CPT

## 2024-07-19 PROCEDURE — 97116 GAIT TRAINING THERAPY: CPT

## 2024-07-19 RX ORDER — ACETAMINOPHEN 325 MG/1
975 TABLET ORAL EVERY 6 HOURS
Qty: 120 TABLET | Refills: 3 | Status: SHIPPED
Start: 2024-07-19

## 2024-07-19 RX ORDER — SENNA AND DOCUSATE SODIUM 50; 8.6 MG/1; MG/1
1 TABLET, FILM COATED ORAL 2 TIMES DAILY
Qty: 28 TABLET | Refills: 0 | Status: SHIPPED
Start: 2024-07-19 | End: 2024-08-02

## 2024-07-19 RX ORDER — CLOPIDOGREL BISULFATE 75 MG/1
75 TABLET ORAL DAILY
Qty: 30 TABLET | Refills: 3 | Status: SHIPPED
Start: 2024-07-20

## 2024-07-19 RX ORDER — LANOLIN ALCOHOL/MO/W.PET/CERES
400 CREAM (GRAM) TOPICAL 2 TIMES DAILY
Qty: 60 TABLET | Refills: 0 | Status: SHIPPED
Start: 2024-07-19 | End: 2024-08-18

## 2024-07-19 RX ORDER — METHOCARBAMOL 500 MG/1
500 TABLET, FILM COATED ORAL 3 TIMES DAILY
Qty: 30 TABLET | Refills: 0 | Status: SHIPPED
Start: 2024-07-19 | End: 2024-07-29

## 2024-07-19 RX ORDER — PANTOPRAZOLE SODIUM 40 MG/1
40 TABLET, DELAYED RELEASE ORAL
Qty: 90 TABLET | Refills: 1 | Status: SHIPPED
Start: 2024-07-19

## 2024-07-19 RX ORDER — LANOLIN ALCOHOL/MO/W.PET/CERES
3 CREAM (GRAM) TOPICAL NIGHTLY PRN
Qty: 10 TABLET | Refills: 0 | Status: SHIPPED
Start: 2024-07-19 | End: 2024-07-29

## 2024-07-19 RX ORDER — ROSUVASTATIN CALCIUM 40 MG/1
40 TABLET, COATED ORAL DAILY
Qty: 30 TABLET | Refills: 3 | Status: SHIPPED
Start: 2024-07-20

## 2024-07-19 RX ORDER — POLYETHYLENE GLYCOL 3350 17 G/17G
17 POWDER, FOR SOLUTION ORAL DAILY
Qty: 14 PACKET | Refills: 0 | Status: SHIPPED
Start: 2024-07-20 | End: 2024-08-03

## 2024-07-19 RX ADMIN — ASPIRIN 81 MG: 81 TABLET, COATED ORAL at 09:52

## 2024-07-19 RX ADMIN — METHOCARBAMOL 500 MG: 500 TABLET ORAL at 09:52

## 2024-07-19 RX ADMIN — ACETAMINOPHEN 975 MG: 325 TABLET ORAL at 12:29

## 2024-07-19 RX ADMIN — FAMOTIDINE 20 MG: 20 TABLET, FILM COATED ORAL at 09:50

## 2024-07-19 RX ADMIN — MUPIROCIN: 20 OINTMENT TOPICAL at 09:52

## 2024-07-19 RX ADMIN — SENNOSIDES AND DOCUSATE SODIUM 1 TABLET: 50; 8.6 TABLET ORAL at 09:50

## 2024-07-19 RX ADMIN — HEPARIN SODIUM 5000 UNITS: 5000 INJECTION INTRAVENOUS; SUBCUTANEOUS at 14:14

## 2024-07-19 RX ADMIN — METHOCARBAMOL 500 MG: 500 TABLET ORAL at 14:15

## 2024-07-19 RX ADMIN — CHLORHEXIDINE GLUCONATE 15 ML: 1.2 RINSE ORAL at 09:52

## 2024-07-19 RX ADMIN — POLYETHYLENE GLYCOL 3350 17 G: 17 POWDER, FOR SOLUTION ORAL at 09:52

## 2024-07-19 RX ADMIN — MAGNESIUM OXIDE TAB 400 MG (241.3 MG ELEMENTAL MG) 400 MG: 400 (241.3 MG) TAB at 09:50

## 2024-07-19 RX ADMIN — SODIUM CHLORIDE, PRESERVATIVE FREE 10 ML: 5 INJECTION INTRAVENOUS at 09:53

## 2024-07-19 RX ADMIN — GABAPENTIN 100 MG: 100 CAPSULE ORAL at 09:52

## 2024-07-19 RX ADMIN — ACETAMINOPHEN 975 MG: 325 TABLET ORAL at 07:25

## 2024-07-19 RX ADMIN — HEPARIN SODIUM 5000 UNITS: 5000 INJECTION INTRAVENOUS; SUBCUTANEOUS at 07:26

## 2024-07-19 RX ADMIN — ROSUVASTATIN 40 MG: 40 TABLET, FILM COATED ORAL at 09:50

## 2024-07-19 ASSESSMENT — PAIN SCALES - GENERAL: PAINLEVEL_OUTOF10: 0

## 2024-07-19 NOTE — PROGRESS NOTES
2000: Bedside shift change report given to Divine RN (oncoming nurse) by Yanira RN (offgoing nurse). Report included the following information Nurse Handoff Report, Adult Overview, Surgery Report, Intake/Output, MAR, and Recent Results.

## 2024-07-19 NOTE — PROCEDURES
PROCEDURE NOTE  Date: 7/19/2024   Name: Matthew Puente  YOB: 1940    Procedures     Patient has not used pacing wires in the last 72 hours. Patient A and V wires discontinued via cutting of wires. Skin cleaned with chlorohexidine swab, sutures cut, pressure applied to pull wires taut and skin pushed down using scissors. Wires cut, skin released back over remaining wire ends. Patient tolerated well, hemodynamically stable at end of procedure. Mild amount of bleeding from V wire site. Pressure applied, dressing placed, RN notified.     Rizwana Mitchell, [facility]-BC

## 2024-07-19 NOTE — PLAN OF CARE
Problem: Physical Therapy - Adult  Goal: By Discharge: Performs mobility at highest level of function for planned discharge setting.  See evaluation for individualized goals.  Description: FUNCTIONAL STATUS PRIOR TO ADMISSION: Patient was independent and active without use of DME. Denies hx falls. Lives alone but has many supportive family members in area. Hx of significant glaucoma with visual impairment, reports he does continue to drive on a restricted license (only short distances during daylight hours), otherwise takes ride-share options. Per chart, either family will stay with patient or he may be able to stay with family at discharge.     HOME SUPPORT PRIOR TO ADMISSION: The patient lived alone with local family to provide assistance.    Physical Therapy Goals  Initiated 7/16/2024  1.  Patient will move from supine to sit and sit to supine in bed with minimal assistance within 5 day(s).    2.  Patient will perform sit to stand with minimal assistance within 5 day(s).  3.  Patient will transfer from bed to chair and chair to bed with minimal assistance using the least restrictive device within 5 day(s).  4.  Patient will ambulate with minimal assistance for 30 feet with the least restrictive device within 5 day(s).   5.  Patient will ascend/descend 3 stairs with one handrail(s) with minimal assistance within 5 day(s).  6.  Patient will perform cardiac exercises per protocol with contact guard assist within 5 days.  7.  Patient will verbally recall and functionally demonstrate mindful-based movements (\"move in the tube\") principles without cues within 5 days.    Outcome: Progressing   PHYSICAL THERAPY TREATMENT    Patient: Matthew Puente (83 y.o. male)  Date: 7/19/2024  Diagnosis: Chest pain [R07.9]  NSTEMI (non-ST elevated myocardial infarction) (HCC) [I21.4] NSTEMI (non-ST elevated myocardial infarction) (HCC)  Procedure(s) (LRB):  CORONARY ARTERY BYPASS GRAFT x3, CPB, RIGHT LEG SVEVH, LIMA, VICTORINA AND EPI

## 2024-07-19 NOTE — DISCHARGE INSTRUCTIONS
Cardiac Surgery Specialist    [address]       [address]                             [place] [address]                       [facility] [address]  Office- [phone]  Fax- [phone]       Office- [phone]  Fax- [phone]  _____________________________________________________________  Dr. Shay Mitchell, NP            Shruti Gee, NP  Dr. Tevin Freitas,NP       KYM Omalley, PAVioletaC  Dr. Anders Brothers, MICHELET Lowry, PAVioletaC  Dr. Randy Blanton, MICHELET Dejesus, PAVioletaC  Dr. Anthony Mcelroy, MICHELET Epstein, PA-C                   _____________________________________________________________    Name:Matthew DAVIES Cindi     Surgery & Date:   7/15/24 with Dr. Church:   CORONARY ARTERY BYPASS GRAFT x3, CPB, RIGHT LEG SVEVH,   GARCIA to LAD  SVG to int tiffany  SVG to FELECIA   VICTORINA AND EPI AORTIC U/S BY DR GOMEZ    Discharge Date: 7/19/24    MEDICATIONS:  Please refer to your After Visit Summary for your medication list. If you do not have a prescription for a new medication, you may purchase the medication over the counter.   DO NOT TAKE ANY MEDICATIONS THAT ARE NOT ON THIS LIST    INSTRUCTIONS:  NO SMOKING OR TOBACCO PRODUCTS  Follow all the instructions in your discharge book  You may shower.  Wash all incisions twice daily with mild soap and water.  No lotions, ointments or powder.  Call the office immediately for any redness, swelling, or drainage from your incision.   Take your temperature daily and call for a temperature of 101 degrees or higher or for any symptoms that make you think you have and infection.  Weigh yourself each morning.  Call if you gain more than 5 pounds in 48 hours.  Use the incentive spirometer 6-8 times a day-10 breaths each time.  Use a pillow or your bear to splint your breastbone when coughing or sneezing.  If

## 2024-07-19 NOTE — PROGRESS NOTES
Cardiac Surgery Care Coordinator-  Met with Matthew Puente and his daughter, reviewed plan of care and discharge instructions. Reinforced move in the tube, sternal precautions and continued use of the incentive spirometer.  Assisted Mr Puente to the bathroom, he was able to ambulate with one-person assist.  He will be discharged later today to inpt rehab. Red reminder bracelet on left wrist, reviewed purpose of the bracelet and when to call the MD.  Reminded pt of appts and encouraged participation in the Cardiac Wellness and rehab program after discharge from inpt rehab.   Encouraged Matthew Puente to verbalize and emotional support given.  Matthew Puente is without questions or concerns at this time. Will follow up with a phone call after discharge

## 2024-07-19 NOTE — PLAN OF CARE
1600: report called to [facility]  1705: pt left with [facility], packet containing AVS MAR and face sheet  Problem: Physical Therapy - Adult  Goal: By Discharge: Performs mobility at highest level of function for planned discharge setting.  See evaluation for individualized goals.  Description: FUNCTIONAL STATUS PRIOR TO ADMISSION: Patient was independent and active without use of DME. Denies hx falls. Lives alone but has many supportive family members in area. Hx of significant glaucoma with visual impairment, reports he does continue to drive on a restricted license (only short distances during daylight hours), otherwise takes ride-share options. Per chart, either family will stay with patient or he may be able to stay with family at discharge.     HOME SUPPORT PRIOR TO ADMISSION: The patient lived alone with local family to provide assistance.    Physical Therapy Goals  Initiated 7/16/2024  1.  Patient will move from supine to sit and sit to supine in bed with minimal assistance within 5 day(s).    2.  Patient will perform sit to stand with minimal assistance within 5 day(s).  3.  Patient will transfer from bed to chair and chair to bed with minimal assistance using the least restrictive device within 5 day(s).  4.  Patient will ambulate with minimal assistance for 30 feet with the least restrictive device within 5 day(s).   5.  Patient will ascend/descend 3 stairs with one handrail(s) with minimal assistance within 5 day(s).  6.  Patient will perform cardiac exercises per protocol with contact guard assist within 5 days.  7.  Patient will verbally recall and functionally demonstrate mindful-based movements (\"move in the tube\") principles without cues within 5 days.    7/19/2024 1158 by Tammie Christy, PT  Outcome: Progressing

## 2024-07-19 NOTE — PROGRESS NOTES
1950: Bedside and Verbal shift change report given to willis (oncoming nurse) by reina  (offgoing nurse). Report included the following information Nurse Handoff Report.       Uneventful shift        0740: TRANSFER - OUT REPORT:    Verbal report given to Vitor(name) on Matthew Puente  being transferred to CVSU(unit) for routine progression of patient care  Report consisted of patient’s Situation, Background, Assessment and   Recommendations(SBAR).   Information from the following report(s) SBAR was reviewed with the receiving nurse.  Lines:   Peripheral IV 07/13/24 Right;Anterior Forearm (Active)   Site Assessment Clean, dry & intact 07/19/24 0400   Line Status Flushed;Capped 07/19/24 0400   Line Care Chlorhexidine wipes 07/19/24 0400   Phlebitis Assessment No symptoms 07/19/24 0400   Infiltration Assessment 0 07/19/24 0400   Alcohol Cap Used Yes 07/19/24 0400   Dressing Status Clean, dry & intact 07/19/24 0400   Dressing Type Transparent 07/19/24 0400       Peripheral IV 07/14/24 Distal;Right Cephalic (Active)   Site Assessment Clean, dry & intact 07/19/24 0400   Line Status Flushed;Capped 07/19/24 0400   Line Care Chlorhexidine wipes 07/19/24 0400   Phlebitis Assessment No symptoms 07/19/24 0400   Infiltration Assessment 0 07/19/24 0400   Alcohol Cap Used Yes 07/19/24 0400   Dressing Status Clean, dry & intact 07/19/24 0400   Dressing Type Transparent 07/19/24 0400   Dressing Intervention New 07/14/24 2015     Opportunity for questions and clarification was provided.    Patient transported with:  Monitor, Registered Nurse, and Tech

## 2024-07-19 NOTE — DISCHARGE SUMMARY
Cardiac Surgery Discharge Summary     Patient ID:  Matthew DAVIES Cindi  [id number]  83 y.o.  1940    Admit date: 7/13/2024    Discharge date: 7/19/2024     Admitting Physician:Dr. Shay Church     Referring Cardiologist: Dr. Walters    PCP:  Sushant Larsen MD    Admitting Diagnoses: CAD, NSTEMI    Discharge Diagnoses: s/p CABG    1. NSTEMI (non-ST elevated myocardial infarction) (HCC)    2. Chest pain        Discharged Condition: Good    Disposition: discharged to [facility]    Procedures for this admission:    7/15/24 with Dr. Church:   CORONARY ARTERY BYPASS GRAFT x3, CPB, RIGHT LEG SVEVH,   GARCIA to LAD  SVG to int tiffany  SVG to FELECIA   VICTORINA AND EPI AORTIC U/S BY DR GOMEZ    Discharge Medications:      Medication List        START taking these medications      acetaminophen 325 MG tablet  Commonly known as: TYLENOL  Take 3 tablets by mouth every 6 hours Transition to as needed as pain improves     clopidogrel 75 MG tablet  Commonly known as: Plavix  Take 1 tablet by mouth daily  Start taking on: July 20, 2024     magnesium oxide 400 (240 Mg) MG tablet  Commonly known as: MAG-OX  Take 1 tablet by mouth 2 times daily     melatonin 3 MG Tabs tablet  Take 1 tablet by mouth nightly as needed (Insomnia)     methocarbamol 500 MG tablet  Commonly known as: ROBAXIN  Take 1 tablet by mouth 3 times daily for 10 days Decrease as pain improves     pantoprazole 40 MG tablet  Commonly known as: PROTONIX  Take 1 tablet by mouth every morning (before breakfast) While on Aspirin and Plavix for GI bleed prophylaxis     polyethylene glycol 17 g packet  Commonly known as: GLYCOLAX  Take 1 packet by mouth daily for 14 days  Start taking on: July 20, 2024     rosuvastatin 40 MG tablet  Commonly known as: CRESTOR  Take 1 tablet by mouth daily  Start taking on: July 20, 2024     sennosides-docusate sodium 8.6-50 MG tablet  Commonly known as: SENOKOT-S  Take 1 tablet by mouth 2 times daily for 14 days         -- spoke with [facility], discussed diuresing if any issues at rehab      Xray Result (most recent):  XR CHEST PORTABLE 07/18/2024    Narrative  EXAM: XR CHEST PORTABLE    DATE: 7/18/2024 5:08 AM    INDICATION: post CT pull    COMPARISON: 7/17/2024    TECHNIQUE: A portable AP radiograph of the chest was obtained.    FINDINGS:  Interval removal of left-sided chest tube. No pneumothorax. Stable cardiac and  mediastinal contours. Mild bibasilar atelectasis. Trace left pleural effusion.    Impression  No pneumothorax post left chest tube removal.      Electronically signed by DARIN PALACIOS      Patient Instructions/Follow Up Care:  Discharge instructions were reviewed with the patient and family present. Questions were also answered at this time. Prescriptions and medications were reviewed. The patient has a follow up appointment on 8/19/24 at 2pm with Dr. Church.The patient was also instructed to follow up with his primary care physician as needed. The patient and family were encouraged to call with any questions or concerns.       Signed:  GRACE MARCOS NP  7/19/2024  11:27 AM

## 2024-07-19 NOTE — CARE COORDINATION
Inpatient Rehab/ Hospital to Hospital Transition of Care Note /Discharge Note     Accepting Physician: Dr. Greene    Discharging Physician: Dr. Church     Accepting Representative: Rea Ybarra Facility: [facility], room 2144    RN call to report: [phone]    Provider Handoff: [phone]    Transport: [facility] ([facility]) phone [phone]      time: 5pm     Ambulance packet at bedside chart.       Family notified: CM met with the patient and family member and they are in agreement with the discharge plan. CM also reviewed an important message from Medicare and patient verbalized understanding and gave permission for possible discharge within 4 hours of receiving IMM.  All questions have been answered and CM wished family well.       The attending physician and the primary nurse were notified of the plan.      ELENA Shaikh CCM  Care Management

## 2024-07-29 ENCOUNTER — TELEPHONE (OUTPATIENT)
Dept: PRIMARY CARE CLINIC | Facility: CLINIC | Age: 84
End: 2024-07-29

## 2024-08-19 ENCOUNTER — OFFICE VISIT (OUTPATIENT)
Age: 84
End: 2024-08-19

## 2024-08-19 VITALS
BODY MASS INDEX: 24.19 KG/M2 | RESPIRATION RATE: 20 BRPM | OXYGEN SATURATION: 97 % | HEART RATE: 77 BPM | TEMPERATURE: 98.1 F | SYSTOLIC BLOOD PRESSURE: 101 MMHG | WEIGHT: 134.5 LBS | DIASTOLIC BLOOD PRESSURE: 67 MMHG

## 2024-08-19 DIAGNOSIS — Z95.1 S/P CABG (CORONARY ARTERY BYPASS GRAFT): Primary | ICD-10-CM

## 2024-08-19 PROCEDURE — 99024 POSTOP FOLLOW-UP VISIT: CPT | Performed by: THORACIC SURGERY (CARDIOTHORACIC VASCULAR SURGERY)

## 2024-08-19 NOTE — PROGRESS NOTES
Patient: Matthew Puente   Age: 83 y.o.     Patient Care Team:  Sushant Larsen MD as PCP - General (Internal Medicine)  [facility]  Shay Church MD as Consulting Physician (Cardiothoracic Surgery)  Bennett Hammond MD as Hospitalist (Internal Medicine)    Diagnosis: The encounter diagnosis was S/P CABG (coronary artery bypass graft).    Problem List:   Patient Active Problem List   Diagnosis    BPH (benign prostatic hyperplasia)    NSTEMI (non-ST elevated myocardial infarction) ([facility])    S/P CABG x 3    Hyperglycemia        7/15/24 with Dr. Church:   CORONARY ARTERY BYPASS GRAFT x3, CPB, RIGHT LEG SVEVH,   GARCIA to LAD  SVG to int tiffany  SVG to FELECIA   VICTORINA AND EPI AORTIC U/S BY DR GOMEZ    HPI:  Pt is here for post op follow up, seen with Dr. Church. He has no complaints. He denies fever, sternal clicking/popping, and incisional drainage/openings. He denes weight loss/gain, angina, dizziness, SOB, palpitations, and LE edema. He reports he is moving around his house well and performing ADLs independently without issue. The patient is eating well, voiding without issue, and having regular bowel movements. The patient reports he is sleeping without issue.      Current Medications:   Current Outpatient Medications   Medication Sig Dispense Refill    clopidogrel (PLAVIX) 75 MG tablet Take 1 tablet by mouth daily 30 tablet 3    carboxymethylcellulose PF (RETAINE CMC) 0.5 % SOLN ophthalmic solution Place 1 drop into both eyes 2 times daily      aspirin 81 MG EC tablet Take 1 tablet by mouth daily      acetaminophen (TYLENOL) 325 MG tablet Take 3 tablets by mouth every 6 hours Transition to as needed as pain improves (Patient not taking: Reported on 8/19/2024) 120 tablet 3    rosuvastatin (CRESTOR) 40 MG tablet Take 1 tablet by mouth daily 30 tablet 3    melatonin 3 MG TABS tablet Take 1 tablet by mouth nightly as needed (Insomnia) 10 tablet 0    magnesium oxide

## 2024-08-27 ENCOUNTER — HOSPITAL ENCOUNTER (OUTPATIENT)
Facility: HOSPITAL | Age: 84
Setting detail: RECURRING SERIES
Discharge: HOME | End: 2024-08-30
Payer: MEDICARE

## 2024-08-27 VITALS — RESPIRATION RATE: 18 BRPM | BODY MASS INDEX: 23.74 KG/M2 | WEIGHT: 134 LBS | HEIGHT: 63 IN | OXYGEN SATURATION: 98 %

## 2024-08-27 PROCEDURE — 93798 PHYS/QHP OP CAR RHAB W/ECG: CPT

## 2024-08-27 PROCEDURE — 93797 PHYS/QHP OP CAR RHAB WO ECG: CPT

## 2024-08-27 RX ORDER — BENZONATATE 100 MG/1
100 CAPSULE ORAL 3 TIMES DAILY PRN
COMMUNITY

## 2024-08-27 RX ORDER — FERROUS SULFATE 325(65) MG
325 TABLET ORAL
COMMUNITY

## 2024-08-27 ASSESSMENT — PATIENT HEALTH QUESTIONNAIRE - PHQ9
SUM OF ALL RESPONSES TO PHQ9 QUESTIONS 1 & 2: 0
5. POOR APPETITE OR OVEREATING: NOT AT ALL
3. TROUBLE FALLING OR STAYING ASLEEP: NOT AT ALL
2. FEELING DOWN, DEPRESSED OR HOPELESS: NOT AT ALL
6. FEELING BAD ABOUT YOURSELF - OR THAT YOU ARE A FAILURE OR HAVE LET YOURSELF OR YOUR FAMILY DOWN: NOT AT ALL
SUM OF ALL RESPONSES TO PHQ QUESTIONS 1-9: 0
8. MOVING OR SPEAKING SO SLOWLY THAT OTHER PEOPLE COULD HAVE NOTICED. OR THE OPPOSITE, BEING SO FIGETY OR RESTLESS THAT YOU HAVE BEEN MOVING AROUND A LOT MORE THAN USUAL: NOT AT ALL
7. TROUBLE CONCENTRATING ON THINGS, SUCH AS READING THE NEWSPAPER OR WATCHING TELEVISION: NOT AT ALL
10. IF YOU CHECKED OFF ANY PROBLEMS, HOW DIFFICULT HAVE THESE PROBLEMS MADE IT FOR YOU TO DO YOUR WORK, TAKE CARE OF THINGS AT HOME, OR GET ALONG WITH OTHER PEOPLE: NOT DIFFICULT AT ALL
4. FEELING TIRED OR HAVING LITTLE ENERGY: NOT AT ALL
1. LITTLE INTEREST OR PLEASURE IN DOING THINGS: NOT AT ALL
SUM OF ALL RESPONSES TO PHQ QUESTIONS 1-9: 0
9. THOUGHTS THAT YOU WOULD BE BETTER OFF DEAD, OR OF HURTING YOURSELF: NOT AT ALL
SUM OF ALL RESPONSES TO PHQ QUESTIONS 1-9: 0
SUM OF ALL RESPONSES TO PHQ QUESTIONS 1-9: 0

## 2024-08-27 ASSESSMENT — LIFESTYLE VARIABLES: SMOKELESS_TOBACCO: NO

## 2024-08-27 ASSESSMENT — EXERCISE STRESS TEST
PEAK_RPE: 11
PEAK_BP: 144/70
PEAK_BP: 144/70
PEAK_HR: 91
PEAK_METS: 1.9
PEAK_RPE: 11
PEAK_BP: 144/70
PEAK_HR: 91

## 2024-08-27 ASSESSMENT — EJECTION FRACTION: EF_VALUE: 55

## 2024-08-27 NOTE — CARDIO/PULMONARY
INTAKE APPOINTMENT NOTE  2024    NAME: Matthew Puente : 1940 AGE: 84 y.o.  GENDER: male    CARDIAC REHAB ADMITTING DIAGNOSIS: Presence of aortocoronary bypass graft [Z95.1]  Non-ST elevation (NSTEMI) myocardial infarction [I21.4]    REFERRING PHYSICIAN: Shay Church MD    MEDICAL HX:  Past Medical History:   Diagnosis Date    BPH (benign prostatic hyperplasia)     CAD (coronary artery disease) 07/15/2024    CABG x 3    Constipation     Glaucoma     Snoring      Past Surgical History:   Procedure Laterality Date    CARDIAC PROCEDURE N/A 2024    Left heart cath / coronary angiography performed by Óscar Walters III, DO at [facility] CARDIAC CATH LAB    CARDIAC PROCEDURE N/A 2024    Percutaneous coronary intervention performed by Óscar Walters III, DO at [facility] CARDIAC CATH LAB    CARDIAC PROCEDURE N/A 2024    Instantaneous wave-free ratio (iFR) performed by Óscar Walters III, DO at [facility] CARDIAC CATH LAB    COLONOSCOPY  2010    normal - return in 10 years    CORONARY ARTERY BYPASS GRAFT N/A 7/15/2024    CORONARY ARTERY BYPASS GRAFT x3, CPB, RIGHT LEG SVEVH, LIMA, VICTORINA AND EPI AORTIC U/S BY DR GOMEZ performed by Shay Church MD at [facility] OPEN HEART    VASECTOMY        LABS:     No results found for: \"HBA1C\", \"[id number]\"  Lab Results   Component Value Date/Time    CHOL 177 2024 10:43 AM    HDL 39 2024 10:43 AM     2024 10:43 AM    .8 2023 11:38 AM    VLDL 33 2024 10:43 AM         ANTHROPOMETRICS:      Ht Readings from Last 1 Encounters:   24 1.588 m (5' 2.5\")      Wt Readings from Last 1 Encounters:   24 60.8 kg (134 lb)        WAIST: 35.5 inches       VISIT SUMMARY:    Matthew Puente 84 y.o. presented to Cardiac Rehab for program orientation and 6 minute walk test today with a primary diagnosis of Presence of aortocoronary bypass graft [Z95.1]  Non-ST elevation (NSTEMI) myocardial infarction [I21.4]  CABG x 3 on 7/15/24, NSTEMI on 7/13/24. His EF is 55 %. Cardiac risk factors include family history, dyslipidemia, stress.     Matthew Puente lives alone and is currently retired. He has two grown children who live nearby and one who lives in [place] who is visiting frequently to help. Patient was evaluated for depression using the PHQ-9 assessment tool with a result of 0 which is considered mild. The result was discussed with patient.    Patient denied chest pain or SOB during 6 minute walk test and was in SR with an inverted T wave with rare MF PVCs. Patient walked 0.1 mi at a speed of 1.1 mph and grade of 0 % for a final MET level of 1.9. Exercise prescription developed using exercise tolerance results and patient stated goals, to be supplemented with home exercise recommendations. Education manual given to patient and reviewed. Patient will attend cardiac rehab 2 times/week which will include both exercise and education sessions.    Intake Start Time: 1145  Intake End Time: 1330    Shereen Soto, RN, BSN

## 2024-08-28 ENCOUNTER — HOSPITAL ENCOUNTER (OUTPATIENT)
Facility: HOSPITAL | Age: 84
Setting detail: RECURRING SERIES
Discharge: HOME | End: 2024-08-31
Payer: MEDICARE

## 2024-08-28 VITALS — WEIGHT: 135.6 LBS | BODY MASS INDEX: 24.41 KG/M2

## 2024-08-28 PROCEDURE — 93798 PHYS/QHP OP CAR RHAB W/ECG: CPT

## 2024-08-28 ASSESSMENT — EXERCISE STRESS TEST
PEAK_METS: 1.9
PEAK_RPE: 11

## 2024-08-29 ENCOUNTER — HOSPITAL ENCOUNTER (OUTPATIENT)
Facility: HOSPITAL | Age: 84
Setting detail: RECURRING SERIES
End: 2024-08-29
Payer: MEDICARE

## 2024-08-29 VITALS — BODY MASS INDEX: 24.12 KG/M2 | WEIGHT: 134 LBS

## 2024-08-29 PROCEDURE — 93798 PHYS/QHP OP CAR RHAB W/ECG: CPT

## 2024-08-29 ASSESSMENT — EXERCISE STRESS TEST
PEAK_METS: 1.9
PEAK_RPE: 12

## 2024-09-03 ENCOUNTER — HOSPITAL ENCOUNTER (OUTPATIENT)
Facility: HOSPITAL | Age: 84
Setting detail: RECURRING SERIES
Discharge: HOME | End: 2024-09-06
Payer: MEDICARE

## 2024-09-03 VITALS — BODY MASS INDEX: 24.8 KG/M2 | WEIGHT: 137.8 LBS

## 2024-09-03 PROCEDURE — 93798 PHYS/QHP OP CAR RHAB W/ECG: CPT

## 2024-09-03 PROCEDURE — 93797 PHYS/QHP OP CAR RHAB WO ECG: CPT

## 2024-09-03 ASSESSMENT — EXERCISE STRESS TEST
PEAK_METS: 2.8
PEAK_RPE: 12

## 2024-09-05 ENCOUNTER — HOSPITAL ENCOUNTER (OUTPATIENT)
Facility: HOSPITAL | Age: 84
Setting detail: RECURRING SERIES
Discharge: HOME | End: 2024-09-08
Payer: MEDICARE

## 2024-09-05 VITALS — BODY MASS INDEX: 24.44 KG/M2 | WEIGHT: 135.8 LBS

## 2024-09-05 PROCEDURE — 93798 PHYS/QHP OP CAR RHAB W/ECG: CPT

## 2024-09-05 ASSESSMENT — EXERCISE STRESS TEST
PEAK_RPE: 12
PEAK_METS: 2.8

## 2024-09-10 ENCOUNTER — HOSPITAL ENCOUNTER (OUTPATIENT)
Facility: HOSPITAL | Age: 84
Setting detail: RECURRING SERIES
Discharge: HOME | End: 2024-09-13
Payer: MEDICARE

## 2024-09-10 VITALS — BODY MASS INDEX: 24.12 KG/M2 | WEIGHT: 134 LBS

## 2024-09-10 PROCEDURE — 93797 PHYS/QHP OP CAR RHAB WO ECG: CPT

## 2024-09-10 PROCEDURE — 93798 PHYS/QHP OP CAR RHAB W/ECG: CPT

## 2024-09-10 ASSESSMENT — EXERCISE STRESS TEST
PEAK_RPE: 12
PEAK_METS: 2.8

## 2024-09-12 ENCOUNTER — HOSPITAL ENCOUNTER (OUTPATIENT)
Facility: HOSPITAL | Age: 84
Setting detail: RECURRING SERIES
Discharge: HOME | End: 2024-09-15
Payer: MEDICARE

## 2024-09-12 VITALS — WEIGHT: 136.6 LBS | BODY MASS INDEX: 24.59 KG/M2

## 2024-09-12 PROCEDURE — 93798 PHYS/QHP OP CAR RHAB W/ECG: CPT

## 2024-09-12 ASSESSMENT — EXERCISE STRESS TEST
PEAK_METS: 2.8
PEAK_RPE: 12

## 2024-09-17 ENCOUNTER — HOSPITAL ENCOUNTER (OUTPATIENT)
Facility: HOSPITAL | Age: 84
Setting detail: RECURRING SERIES
Discharge: HOME | End: 2024-09-20
Payer: MEDICARE

## 2024-09-17 VITALS — WEIGHT: 136.4 LBS | BODY MASS INDEX: 24.55 KG/M2

## 2024-09-17 PROCEDURE — 93798 PHYS/QHP OP CAR RHAB W/ECG: CPT

## 2024-09-17 PROCEDURE — 93797 PHYS/QHP OP CAR RHAB WO ECG: CPT

## 2024-09-17 ASSESSMENT — EXERCISE STRESS TEST
PEAK_METS: 2.8
PEAK_RPE: 12

## 2024-09-17 ASSESSMENT — LIFESTYLE VARIABLES: SMOKELESS_TOBACCO: NO

## 2024-09-17 ASSESSMENT — EJECTION FRACTION: EF_VALUE: 55

## 2024-09-18 ENCOUNTER — OFFICE VISIT (OUTPATIENT)
Age: 84
End: 2024-09-18
Payer: MEDICARE

## 2024-09-18 VITALS
RESPIRATION RATE: 16 BRPM | HEART RATE: 73 BPM | DIASTOLIC BLOOD PRESSURE: 80 MMHG | SYSTOLIC BLOOD PRESSURE: 118 MMHG | HEIGHT: 63 IN | BODY MASS INDEX: 24.1 KG/M2 | WEIGHT: 136 LBS | OXYGEN SATURATION: 97 %

## 2024-09-18 DIAGNOSIS — I21.4 NSTEMI (NON-ST ELEVATED MYOCARDIAL INFARCTION) (HCC): ICD-10-CM

## 2024-09-18 DIAGNOSIS — Z95.1 S/P CABG X 3: Primary | ICD-10-CM

## 2024-09-18 PROCEDURE — 99205 OFFICE O/P NEW HI 60 MIN: CPT | Performed by: INTERNAL MEDICINE

## 2024-09-18 RX ORDER — ROSUVASTATIN CALCIUM 40 MG/1
40 TABLET, COATED ORAL DAILY
Qty: 90 TABLET | Refills: 3 | Status: SHIPPED | OUTPATIENT
Start: 2024-09-18

## 2024-09-18 RX ORDER — CLOPIDOGREL BISULFATE 75 MG/1
75 TABLET ORAL DAILY
Qty: 90 TABLET | Refills: 3 | Status: SHIPPED | OUTPATIENT
Start: 2024-09-18

## 2024-09-18 RX ORDER — POLYETHYLENE GLYCOL 3350 17 G/17G
POWDER, FOR SOLUTION ORAL
COMMUNITY
Start: 2024-07-31

## 2024-09-18 RX ORDER — SENNOSIDES A AND B 8.6 MG/1
TABLET, FILM COATED ORAL
COMMUNITY
Start: 2024-07-31

## 2024-09-18 ASSESSMENT — PATIENT HEALTH QUESTIONNAIRE - PHQ9
1. LITTLE INTEREST OR PLEASURE IN DOING THINGS: NOT AT ALL
SUM OF ALL RESPONSES TO PHQ QUESTIONS 1-9: 0
SUM OF ALL RESPONSES TO PHQ QUESTIONS 1-9: 0
SUM OF ALL RESPONSES TO PHQ9 QUESTIONS 1 & 2: 0
SUM OF ALL RESPONSES TO PHQ QUESTIONS 1-9: 0
2. FEELING DOWN, DEPRESSED OR HOPELESS: NOT AT ALL
SUM OF ALL RESPONSES TO PHQ QUESTIONS 1-9: 0

## 2024-09-19 ENCOUNTER — HOSPITAL ENCOUNTER (OUTPATIENT)
Facility: HOSPITAL | Age: 84
Setting detail: RECURRING SERIES
Discharge: HOME | End: 2024-09-22
Payer: MEDICARE

## 2024-09-19 VITALS — WEIGHT: 137.4 LBS | BODY MASS INDEX: 24.73 KG/M2

## 2024-09-19 PROCEDURE — 93798 PHYS/QHP OP CAR RHAB W/ECG: CPT

## 2024-09-19 ASSESSMENT — EXERCISE STRESS TEST
PEAK_RPE: 12
PEAK_METS: 2.8

## 2024-09-19 NOTE — CARDIO/PULMONARY
Chart reviewed: Patient is 83 y.o. male admitted with Chest pain [R07.9]  NSTEMI (non-ST elevated myocardial infarction) (HCC) [I21.4]    Education: ERAS education folder is in the car per family.  Met with Matthew Puente  and family to begin cardiac surgery post discharge instructions and to discuss participation in the Cardiac Rehab Program.     Educated using teach back method. Reviewed the use of bear for sternal support, daily weight and temperature monitoring, and use of incentive spirometer.   Matthew Puente was  not able to demonstrate proper use of incentive spirometer despite repeat instruction. Spoke with his primary nurse who can provide another type of IS.     Discussed Cardiac Rehab Program format, benefits, and encouraged participation.  A  referral was placed on day of surgery and the contact information is on his AVS.General questions answered. Matthew Puente verbalized understanding.       Lou Blanco RN  
General Sunscreen Counseling: I recommended a broad spectrum sunscreen with a SPF of 30 or higher.  I explained that SPF 30 sunscreens block approximately 97 percent of the sun's harmful rays.  Sunscreens should be applied at least 15 minutes prior to expected sun exposure and then every 2 hours after that as long as sun exposure continues. If swimming or exercising sunscreen should be reapplied every 45 minutes to an hour after getting wet or sweating.  One ounce, or the equivalent of a shot glass full of sunscreen, is adequate to protect the skin not covered by a bathing suit. I also recommended a lip balm with a sunscreen as well. Sun protective clothing can be used in lieu of sunscreen but must be worn the entire time you are exposed to the sun's rays.
Detail Level: Generalized

## 2024-09-24 ENCOUNTER — APPOINTMENT (OUTPATIENT)
Facility: HOSPITAL | Age: 84
End: 2024-09-24
Payer: MEDICARE

## 2024-09-24 ENCOUNTER — HOSPITAL ENCOUNTER (OUTPATIENT)
Facility: HOSPITAL | Age: 84
Setting detail: RECURRING SERIES
Discharge: HOME | End: 2024-09-27
Payer: MEDICARE

## 2024-09-24 VITALS — BODY MASS INDEX: 24.41 KG/M2 | WEIGHT: 135.6 LBS

## 2024-09-24 PROCEDURE — 93797 PHYS/QHP OP CAR RHAB WO ECG: CPT

## 2024-09-24 PROCEDURE — 93798 PHYS/QHP OP CAR RHAB W/ECG: CPT

## 2024-09-24 ASSESSMENT — EXERCISE STRESS TEST
PEAK_RPE: 12
PEAK_METS: 2.8

## 2024-09-26 ENCOUNTER — APPOINTMENT (OUTPATIENT)
Facility: HOSPITAL | Age: 84
End: 2024-09-26
Payer: MEDICARE

## 2024-10-01 ENCOUNTER — HOSPITAL ENCOUNTER (OUTPATIENT)
Facility: HOSPITAL | Age: 84
Setting detail: RECURRING SERIES
Discharge: HOME | End: 2024-10-04
Payer: MEDICARE

## 2024-10-01 VITALS — WEIGHT: 134.4 LBS | BODY MASS INDEX: 24.19 KG/M2

## 2024-10-01 PROCEDURE — 93798 PHYS/QHP OP CAR RHAB W/ECG: CPT

## 2024-10-01 PROCEDURE — 93797 PHYS/QHP OP CAR RHAB WO ECG: CPT

## 2024-10-01 ASSESSMENT — EXERCISE STRESS TEST
PEAK_METS: 2.8
PEAK_RPE: 12

## 2024-10-02 ENCOUNTER — OFFICE VISIT (OUTPATIENT)
Dept: PRIMARY CARE CLINIC | Facility: CLINIC | Age: 84
End: 2024-10-02
Payer: MEDICARE

## 2024-10-02 VITALS
TEMPERATURE: 97.1 F | RESPIRATION RATE: 18 BRPM | HEART RATE: 62 BPM | HEIGHT: 62 IN | DIASTOLIC BLOOD PRESSURE: 77 MMHG | OXYGEN SATURATION: 99 % | WEIGHT: 135 LBS | SYSTOLIC BLOOD PRESSURE: 124 MMHG | BODY MASS INDEX: 24.84 KG/M2

## 2024-10-02 DIAGNOSIS — R79.89 ELEVATED TSH: ICD-10-CM

## 2024-10-02 DIAGNOSIS — R73.03 PREDIABETES: ICD-10-CM

## 2024-10-02 DIAGNOSIS — R26.89 SHUFFLING GAIT: ICD-10-CM

## 2024-10-02 DIAGNOSIS — E53.8 B12 DEFICIENCY: ICD-10-CM

## 2024-10-02 DIAGNOSIS — D64.9 ANEMIA, UNSPECIFIED TYPE: ICD-10-CM

## 2024-10-02 DIAGNOSIS — R53.83 OTHER FATIGUE: ICD-10-CM

## 2024-10-02 DIAGNOSIS — Z23 NEED FOR VACCINATION: ICD-10-CM

## 2024-10-02 DIAGNOSIS — K59.09 OTHER CONSTIPATION: ICD-10-CM

## 2024-10-02 DIAGNOSIS — I25.810 CORONARY ARTERY DISEASE INVOLVING CORONARY BYPASS GRAFT OF NATIVE HEART WITHOUT ANGINA PECTORIS: ICD-10-CM

## 2024-10-02 DIAGNOSIS — R41.3 MEMORY LOSS: ICD-10-CM

## 2024-10-02 DIAGNOSIS — R25.1 TREMORS OF NERVOUS SYSTEM: ICD-10-CM

## 2024-10-02 DIAGNOSIS — Z00.00 MEDICARE ANNUAL WELLNESS VISIT, SUBSEQUENT: Primary | ICD-10-CM

## 2024-10-02 PROCEDURE — 1123F ACP DISCUSS/DSCN MKR DOCD: CPT | Performed by: INTERNAL MEDICINE

## 2024-10-02 PROCEDURE — G0439 PPPS, SUBSEQ VISIT: HCPCS | Performed by: INTERNAL MEDICINE

## 2024-10-02 PROCEDURE — G8482 FLU IMMUNIZE ORDER/ADMIN: HCPCS | Performed by: INTERNAL MEDICINE

## 2024-10-02 PROCEDURE — 90653 IIV ADJUVANT VACCINE IM: CPT | Performed by: INTERNAL MEDICINE

## 2024-10-02 PROCEDURE — G0008 ADMIN INFLUENZA VIRUS VAC: HCPCS | Performed by: INTERNAL MEDICINE

## 2024-10-02 RX ORDER — LUBIPROSTONE 24 UG/1
24 CAPSULE ORAL
Qty: 90 CAPSULE | Refills: 3 | Status: SHIPPED | OUTPATIENT
Start: 2024-10-02

## 2024-10-02 ASSESSMENT — PATIENT HEALTH QUESTIONNAIRE - PHQ9
SUM OF ALL RESPONSES TO PHQ QUESTIONS 1-9: 0
SUM OF ALL RESPONSES TO PHQ9 QUESTIONS 1 & 2: 0
SUM OF ALL RESPONSES TO PHQ QUESTIONS 1-9: 0
1. LITTLE INTEREST OR PLEASURE IN DOING THINGS: NOT AT ALL
2. FEELING DOWN, DEPRESSED OR HOPELESS: NOT AT ALL
SUM OF ALL RESPONSES TO PHQ QUESTIONS 1-9: 0
SUM OF ALL RESPONSES TO PHQ QUESTIONS 1-9: 0

## 2024-10-02 NOTE — PROGRESS NOTES
\"Have you been to the ER, urgent care clinic since your last visit?  Hospitalized since your last visit?\"    NO    “Have you seen or consulted any other health care providers outside our system since your last visit?”    NO         
  Component Value Date/Time    WBC 8.9 07/19/2024 03:03 AM    HGB 9.0 07/19/2024 03:03 AM    HCT 27.8 07/19/2024 03:03 AM     07/19/2024 03:03 AM     Lab Results   Component Value Date/Time     07/19/2024 03:03 AM    K 4.1 07/19/2024 03:03 AM     07/19/2024 03:03 AM    CO2 25 07/19/2024 03:03 AM    BUN 26 07/19/2024 03:03 AM     Lab Results   Component Value Date/Time    CHOL 177 07/13/2024 10:43 AM    HDL 39 07/13/2024 10:43 AM     07/13/2024 10:43 AM    .8 09/13/2023 11:38 AM     No components found for: \"GPT\"        Assessment & Plan         1. Medicare annual wellness visit, subsequent  2. Coronary artery disease involving coronary bypass graft of native heart without angina pectoris  -     Lipid Panel; Future  -     Comprehensive Metabolic Panel; Future  3. Other constipation  -     lubiprostone (AMITIZA) 24 MCG capsule; Take 1 capsule by mouth daily (with breakfast), Disp-90 capsule, R-3Normal  4. Prediabetes  5. Memory loss  -     [facility] - Corbin Oconnor MD, Neurology, [place] ([address]  6. Tremors of nervous system  -     [facility] - Corbin Oconnor MD, Neurology, [place] ([address]  7. Shuffling gait  -     [facility] Corbin Valencia MD, Neurology, [place] ([address]  8. B12 deficiency  -     Vitamin B12 & Folate; Future  9. Need for vaccination  -     Influenza, FLUAD Trivalent, (age 65 y+), IM, Preservative Free, 0.5mL  10. Anemia, unspecified type  -     CBC; Future  -     Ferritin; Future  -     Iron and TIBC; Future  11. Elevated TSH  -     TSH; Future  12. Other fatigue  -     TSH; Future         Explained to patient risk benefits of the medications.Advised patient to stop meds if having any side effects.Pt verbalized understanding of the instructions.    I have discussed the diagnosis with the patient and the intended plan as seen in the above orders.  The patient has received an after-visit summary and questions were answered concerning future plans.  I have

## 2024-10-02 NOTE — PATIENT INSTRUCTIONS
programs and medicines. These can increase your chances of quitting for good. Quitting is one of the most important things you can do to protect your heart. It is never too late to quit. Try to avoid secondhand smoke too.     Stay at a weight that's healthy for you. Talk to your doctor if you need help losing weight.     Try to get 7 to 9 hours of sleep each night.     Limit alcohol to 2 drinks a day for men and 1 drink a day for women. Too much alcohol can cause health problems.     Manage other health problems such as diabetes, high blood pressure, and high cholesterol. If you think you may have a problem with alcohol or drug use, talk to your doctor.   Medicines    Take your medicines exactly as prescribed. Call your doctor if you think you are having a problem with your medicine.     If your doctor recommends aspirin, take the amount directed each day. Make sure you take aspirin and not another kind of pain reliever, such as acetaminophen (Tylenol).   When should you call for help?   Call 911 if you have symptoms of a heart attack. These may include:    Chest pain or pressure, or a strange feeling in the chest.     Sweating.     Shortness of breath.     Pain, pressure, or a strange feeling in the back, neck, jaw, or upper belly or in one or both shoulders or arms.     Lightheadedness or sudden weakness.     A fast or irregular heartbeat.   After you call 911, the  may tell you to chew 1 adult-strength or 2 to 4 low-dose aspirin. Wait for an ambulance. Do not try to drive yourself.  Watch closely for changes in your health, and be sure to contact your doctor if you have any problems.  Where can you learn more?  Go to https://www.[employer].net/patientEd and enter F075 to learn more about \"A Healthy Heart: Care Instructions.\"  Current as of: June 24, 2023  Content Version: 14.1  © 6516-0904 [employer]wise, Inc[employer].   Care instructions adapted under license by [employer]. If you have questions about a

## 2024-10-03 ENCOUNTER — HOSPITAL ENCOUNTER (OUTPATIENT)
Facility: HOSPITAL | Age: 84
Setting detail: RECURRING SERIES
Discharge: HOME | End: 2024-10-06
Payer: MEDICARE

## 2024-10-03 VITALS — WEIGHT: 136.8 LBS | BODY MASS INDEX: 25.02 KG/M2

## 2024-10-03 LAB
ALBUMIN SERPL-MCNC: 4.4 G/DL (ref 3.5–5)
ALBUMIN/GLOB SERPL: 1.2 (ref 1.1–2.2)
ALP SERPL-CCNC: 67 U/L (ref 45–117)
ALT SERPL-CCNC: 26 U/L (ref 12–78)
ANION GAP SERPL CALC-SCNC: 1 MMOL/L (ref 2–12)
AST SERPL-CCNC: 21 U/L (ref 15–37)
BILIRUB SERPL-MCNC: 0.7 MG/DL (ref 0.2–1)
BUN SERPL-MCNC: 18 MG/DL (ref 6–20)
BUN/CREAT SERPL: 15 (ref 12–20)
CALCIUM SERPL-MCNC: 9.9 MG/DL (ref 8.5–10.1)
CHLORIDE SERPL-SCNC: 103 MMOL/L (ref 97–108)
CHOLEST SERPL-MCNC: 138 MG/DL
CO2 SERPL-SCNC: 31 MMOL/L (ref 21–32)
CREAT SERPL-MCNC: 1.19 MG/DL (ref 0.7–1.3)
ERYTHROCYTE [DISTWIDTH] IN BLOOD BY AUTOMATED COUNT: 12.2 % (ref 11.5–14.5)
FERRITIN SERPL-MCNC: 59 NG/ML (ref 26–388)
FOLATE SERPL-MCNC: 18.1 NG/ML (ref 5–21)
GLOBULIN SER CALC-MCNC: 3.7 G/DL (ref 2–4)
GLUCOSE SERPL-MCNC: 99 MG/DL (ref 65–100)
HCT VFR BLD AUTO: 45.7 % (ref 36.6–50.3)
HDLC SERPL-MCNC: 53 MG/DL
HDLC SERPL: 2.6 (ref 0–5)
HGB BLD-MCNC: 14.7 G/DL (ref 12.1–17)
IRON SATN MFR SERPL: 24 % (ref 20–50)
IRON SERPL-MCNC: 79 UG/DL (ref 35–150)
LDLC SERPL CALC-MCNC: 64 MG/DL (ref 0–100)
MCH RBC QN AUTO: 30.2 PG (ref 26–34)
MCHC RBC AUTO-ENTMCNC: 32.2 G/DL (ref 30–36.5)
MCV RBC AUTO: 93.8 FL (ref 80–99)
NRBC # BLD: 0 K/UL (ref 0–0.01)
NRBC BLD-RTO: 0 PER 100 WBC
PLATELET # BLD AUTO: 162 K/UL (ref 150–400)
PMV BLD AUTO: 10.5 FL (ref 8.9–12.9)
POTASSIUM SERPL-SCNC: 4.1 MMOL/L (ref 3.5–5.1)
PROT SERPL-MCNC: 8.1 G/DL (ref 6.4–8.2)
RBC # BLD AUTO: 4.87 M/UL (ref 4.1–5.7)
SODIUM SERPL-SCNC: 135 MMOL/L (ref 136–145)
TIBC SERPL-MCNC: 335 UG/DL (ref 250–450)
TRIGL SERPL-MCNC: 105 MG/DL
TSH SERPL DL<=0.05 MIU/L-ACNC: 3.31 UIU/ML (ref 0.36–3.74)
VIT B12 SERPL-MCNC: 428 PG/ML (ref 193–986)
VLDLC SERPL CALC-MCNC: 21 MG/DL
WBC # BLD AUTO: 5.3 K/UL (ref 4.1–11.1)

## 2024-10-03 PROCEDURE — 93798 PHYS/QHP OP CAR RHAB W/ECG: CPT

## 2024-10-03 ASSESSMENT — EXERCISE STRESS TEST
PEAK_RPE: 12
PEAK_METS: 2.8

## 2024-10-08 ENCOUNTER — HOSPITAL ENCOUNTER (OUTPATIENT)
Facility: HOSPITAL | Age: 84
Setting detail: RECURRING SERIES
Discharge: HOME | End: 2024-10-11
Payer: MEDICARE

## 2024-10-08 VITALS — WEIGHT: 135.6 LBS | BODY MASS INDEX: 24.8 KG/M2

## 2024-10-08 PROCEDURE — 93798 PHYS/QHP OP CAR RHAB W/ECG: CPT

## 2024-10-08 PROCEDURE — 93797 PHYS/QHP OP CAR RHAB WO ECG: CPT

## 2024-10-08 ASSESSMENT — EXERCISE STRESS TEST
PEAK_METS: 2.8
PEAK_RPE: 12

## 2024-10-10 ENCOUNTER — APPOINTMENT (OUTPATIENT)
Facility: HOSPITAL | Age: 84
End: 2024-10-10
Payer: MEDICARE

## 2024-10-15 ENCOUNTER — HOSPITAL ENCOUNTER (OUTPATIENT)
Facility: HOSPITAL | Age: 84
Setting detail: RECURRING SERIES
Discharge: HOME | End: 2024-10-18
Payer: MEDICARE

## 2024-10-15 VITALS — BODY MASS INDEX: 25.09 KG/M2 | WEIGHT: 137.2 LBS

## 2024-10-15 PROCEDURE — 93798 PHYS/QHP OP CAR RHAB W/ECG: CPT

## 2024-10-15 PROCEDURE — 93797 PHYS/QHP OP CAR RHAB WO ECG: CPT

## 2024-10-15 ASSESSMENT — EXERCISE STRESS TEST
PEAK_METS: 2.8
PEAK_RPE: 12

## 2024-10-17 ENCOUNTER — HOSPITAL ENCOUNTER (OUTPATIENT)
Facility: HOSPITAL | Age: 84
Setting detail: RECURRING SERIES
Discharge: HOME | End: 2024-10-20
Payer: MEDICARE

## 2024-10-17 VITALS — WEIGHT: 136.8 LBS | BODY MASS INDEX: 25.02 KG/M2

## 2024-10-17 PROCEDURE — 93798 PHYS/QHP OP CAR RHAB W/ECG: CPT

## 2024-10-17 ASSESSMENT — EXERCISE STRESS TEST
PEAK_METS: 2.8
PEAK_RPE: 12

## 2024-10-22 ENCOUNTER — HOSPITAL ENCOUNTER (OUTPATIENT)
Facility: HOSPITAL | Age: 84
Setting detail: RECURRING SERIES
Discharge: HOME | End: 2024-10-25
Payer: MEDICARE

## 2024-10-22 VITALS — BODY MASS INDEX: 25.19 KG/M2 | WEIGHT: 137.7 LBS

## 2024-10-22 PROCEDURE — 93797 PHYS/QHP OP CAR RHAB WO ECG: CPT

## 2024-10-22 PROCEDURE — 93798 PHYS/QHP OP CAR RHAB W/ECG: CPT

## 2024-10-22 ASSESSMENT — EXERCISE STRESS TEST
PEAK_RPE: 12
PEAK_METS: 2.8

## 2024-10-24 ENCOUNTER — HOSPITAL ENCOUNTER (OUTPATIENT)
Facility: HOSPITAL | Age: 84
Setting detail: RECURRING SERIES
Discharge: HOME | End: 2024-10-27
Payer: MEDICARE

## 2024-10-24 VITALS — WEIGHT: 138 LBS | BODY MASS INDEX: 25.24 KG/M2

## 2024-10-24 PROCEDURE — 93798 PHYS/QHP OP CAR RHAB W/ECG: CPT

## 2024-10-24 ASSESSMENT — EXERCISE STRESS TEST
PEAK_RPE: 12
PEAK_METS: 2.8

## 2024-10-29 ENCOUNTER — HOSPITAL ENCOUNTER (OUTPATIENT)
Facility: HOSPITAL | Age: 84
Setting detail: RECURRING SERIES
Discharge: HOME | End: 2024-11-01
Payer: MEDICARE

## 2024-10-29 VITALS — WEIGHT: 138 LBS | BODY MASS INDEX: 25.24 KG/M2

## 2024-10-29 PROCEDURE — 93797 PHYS/QHP OP CAR RHAB WO ECG: CPT

## 2024-10-29 PROCEDURE — 93798 PHYS/QHP OP CAR RHAB W/ECG: CPT

## 2024-10-29 ASSESSMENT — EXERCISE STRESS TEST
PEAK_METS: 2.8
PEAK_RPE: 12

## 2024-10-31 ENCOUNTER — HOSPITAL ENCOUNTER (OUTPATIENT)
Facility: HOSPITAL | Age: 84
Setting detail: RECURRING SERIES
Discharge: HOME | End: 2024-11-03
Payer: MEDICARE

## 2024-10-31 VITALS — WEIGHT: 138.6 LBS | BODY MASS INDEX: 25.35 KG/M2

## 2024-10-31 PROCEDURE — 93798 PHYS/QHP OP CAR RHAB W/ECG: CPT

## 2024-10-31 ASSESSMENT — EXERCISE STRESS TEST
PEAK_METS: 2.8
PEAK_RPE: 12

## 2024-11-05 ENCOUNTER — HOSPITAL ENCOUNTER (OUTPATIENT)
Facility: HOSPITAL | Age: 84
Setting detail: RECURRING SERIES
Discharge: HOME | End: 2024-11-08
Payer: MEDICARE

## 2024-11-05 VITALS — WEIGHT: 138 LBS | BODY MASS INDEX: 25.24 KG/M2

## 2024-11-05 PROCEDURE — 93797 PHYS/QHP OP CAR RHAB WO ECG: CPT

## 2024-11-05 PROCEDURE — 93798 PHYS/QHP OP CAR RHAB W/ECG: CPT

## 2024-11-05 ASSESSMENT — EXERCISE STRESS TEST
PEAK_METS: 2.8
PEAK_RPE: 12

## 2024-11-07 ENCOUNTER — HOSPITAL ENCOUNTER (OUTPATIENT)
Facility: HOSPITAL | Age: 84
Setting detail: RECURRING SERIES
Discharge: HOME | End: 2024-11-10
Payer: MEDICARE

## 2024-11-07 VITALS — BODY MASS INDEX: 25.35 KG/M2 | WEIGHT: 138.6 LBS

## 2024-11-07 PROCEDURE — 93798 PHYS/QHP OP CAR RHAB W/ECG: CPT

## 2024-11-07 ASSESSMENT — EXERCISE STRESS TEST
PEAK_RPE: 12
PEAK_METS: 3.1

## 2024-11-12 ENCOUNTER — HOSPITAL ENCOUNTER (OUTPATIENT)
Facility: HOSPITAL | Age: 84
Setting detail: RECURRING SERIES
Discharge: HOME | End: 2024-11-15
Payer: MEDICARE

## 2024-11-12 VITALS — WEIGHT: 137.4 LBS | BODY MASS INDEX: 25.13 KG/M2

## 2024-11-12 PROCEDURE — 93798 PHYS/QHP OP CAR RHAB W/ECG: CPT

## 2024-11-12 PROCEDURE — 93797 PHYS/QHP OP CAR RHAB WO ECG: CPT

## 2024-11-12 ASSESSMENT — PATIENT HEALTH QUESTIONNAIRE - PHQ9
4. FEELING TIRED OR HAVING LITTLE ENERGY: NOT AT ALL
8. MOVING OR SPEAKING SO SLOWLY THAT OTHER PEOPLE COULD HAVE NOTICED. OR THE OPPOSITE, BEING SO FIGETY OR RESTLESS THAT YOU HAVE BEEN MOVING AROUND A LOT MORE THAN USUAL: NOT AT ALL
3. TROUBLE FALLING OR STAYING ASLEEP: NEARLY EVERY DAY
9. THOUGHTS THAT YOU WOULD BE BETTER OFF DEAD, OR OF HURTING YOURSELF: NOT AT ALL
6. FEELING BAD ABOUT YOURSELF - OR THAT YOU ARE A FAILURE OR HAVE LET YOURSELF OR YOUR FAMILY DOWN: NOT AT ALL
5. POOR APPETITE OR OVEREATING: NOT AT ALL
2. FEELING DOWN, DEPRESSED OR HOPELESS: NOT AT ALL
SUM OF ALL RESPONSES TO PHQ QUESTIONS 1-9: 3
7. TROUBLE CONCENTRATING ON THINGS, SUCH AS READING THE NEWSPAPER OR WATCHING TELEVISION: NOT AT ALL
1. LITTLE INTEREST OR PLEASURE IN DOING THINGS: NOT AT ALL
SUM OF ALL RESPONSES TO PHQ QUESTIONS 1-9: 3
SUM OF ALL RESPONSES TO PHQ9 QUESTIONS 1 & 2: 0
10. IF YOU CHECKED OFF ANY PROBLEMS, HOW DIFFICULT HAVE THESE PROBLEMS MADE IT FOR YOU TO DO YOUR WORK, TAKE CARE OF THINGS AT HOME, OR GET ALONG WITH OTHER PEOPLE: NOT DIFFICULT AT ALL
SUM OF ALL RESPONSES TO PHQ QUESTIONS 1-9: 3
SUM OF ALL RESPONSES TO PHQ QUESTIONS 1-9: 3

## 2024-11-12 ASSESSMENT — EXERCISE STRESS TEST
PEAK_METS: 3.1
PEAK_RPE: 12

## 2024-11-14 ENCOUNTER — HOSPITAL ENCOUNTER (OUTPATIENT)
Facility: HOSPITAL | Age: 84
Setting detail: RECURRING SERIES
Discharge: HOME | End: 2024-11-17
Payer: MEDICARE

## 2024-11-14 VITALS — WEIGHT: 139 LBS | BODY MASS INDEX: 25.42 KG/M2

## 2024-11-14 PROCEDURE — 93798 PHYS/QHP OP CAR RHAB W/ECG: CPT

## 2024-11-14 ASSESSMENT — EXERCISE STRESS TEST
PEAK_METS: 3.1
PEAK_RPE: 12

## 2024-11-19 ENCOUNTER — HOSPITAL ENCOUNTER (OUTPATIENT)
Facility: HOSPITAL | Age: 84
Setting detail: RECURRING SERIES
Discharge: HOME | End: 2024-11-22
Payer: MEDICARE

## 2024-11-19 VITALS — BODY MASS INDEX: 25.2 KG/M2 | WEIGHT: 137.8 LBS

## 2024-11-19 PROCEDURE — 93798 PHYS/QHP OP CAR RHAB W/ECG: CPT

## 2024-11-19 ASSESSMENT — EXERCISE STRESS TEST
PEAK_RPE: 12
PEAK_METS: 3.1

## 2024-11-19 NOTE — CARDIO/PULMONARY
DISCHARGE SUMMARY NOTE  2024      NAME: Matthew Puente : 1940 AGE: 84 y.o.  GENDER: male    CARDIAC REHAB ADMITTING DIAGNOSIS: Presence of aortocoronary bypass graft [Z95.1]  Non-ST elevation (NSTEMI) myocardial infarction [I21.4]    REFERRING PHYSICIAN: Shay Church MD    MEDICAL HX:  Past Medical History:   Diagnosis Date    BPH (benign prostatic hyperplasia)     CAD (coronary artery disease) 07/15/2024    CABG x 3    Constipation     Glaucoma     Snoring        LABS:     No results found for: \"HBA1C\", \"[id number]\"  Lab Results   Component Value Date/Time    CHOL 138 10/02/2024 11:41 AM    HDL 53 10/02/2024 11:41 AM    LDL 64 10/02/2024 11:41 AM    .8 2023 11:38 AM    VLDL 21 10/02/2024 11:41 AM         ANTHROPOMETRICS:      Ht Readings from Last 1 Encounters:   10/02/24 1.575 m (5' 2\")      Wt Readings from Last 1 Encounters:   24 62.5 kg (137 lb 12.8 oz)        WAIST: 35         PROGRAM SUMMARY:    Matthew Puente completed 36 sessions in phase II of the Cardiac Rehab program. Patient biked 0.65 mi at an avg reddy of 22 and avg rpm of 38 for a final MET level of 3.1. Matthew Puente plans to continue exercising at home and at the [facility] and has set revised goals that include 30 minutes of moderate-intensity aerobic activity 3-5 days weekly.      MET level increased from 1.9 at intake to 3.1 at discharge. Waist lost was 0.5 inches from his waist.  Matthew Puente will focus on diet and nutrition at home and has received individualized healthy nutrition recommendations from [facility] Cardiac Rehab staff.  They are aware of their cardiac disease risk factors and cardiac medications. All questions were addressed and discharge plans discussed. Matthew Puente verbalized understanding.      ALLYSON BARRIENTOS RN  2024

## 2025-03-25 ENCOUNTER — OFFICE VISIT (OUTPATIENT)
Dept: PRIMARY CARE CLINIC | Facility: CLINIC | Age: 85
End: 2025-03-25
Payer: MEDICARE

## 2025-03-25 VITALS
WEIGHT: 143.6 LBS | OXYGEN SATURATION: 98 % | DIASTOLIC BLOOD PRESSURE: 66 MMHG | SYSTOLIC BLOOD PRESSURE: 126 MMHG | TEMPERATURE: 97.8 F | HEIGHT: 62 IN | HEART RATE: 69 BPM | BODY MASS INDEX: 26.43 KG/M2 | RESPIRATION RATE: 18 BRPM

## 2025-03-25 DIAGNOSIS — N40.1 BENIGN PROSTATIC HYPERPLASIA WITH URINARY FREQUENCY: ICD-10-CM

## 2025-03-25 DIAGNOSIS — F32.A DEPRESSION, UNSPECIFIED DEPRESSION TYPE: ICD-10-CM

## 2025-03-25 DIAGNOSIS — R79.89 LOW TESTOSTERONE IN MALE: ICD-10-CM

## 2025-03-25 DIAGNOSIS — R73.9 HYPERGLYCEMIA: ICD-10-CM

## 2025-03-25 DIAGNOSIS — H53.8 BLURRY VISION: ICD-10-CM

## 2025-03-25 DIAGNOSIS — E55.9 VITAMIN D DEFICIENCY: ICD-10-CM

## 2025-03-25 DIAGNOSIS — R53.83 OTHER FATIGUE: Primary | ICD-10-CM

## 2025-03-25 DIAGNOSIS — Z12.5 PROSTATE CANCER SCREENING: ICD-10-CM

## 2025-03-25 DIAGNOSIS — R35.0 BENIGN PROSTATIC HYPERPLASIA WITH URINARY FREQUENCY: ICD-10-CM

## 2025-03-25 PROCEDURE — 1159F MED LIST DOCD IN RCRD: CPT | Performed by: INTERNAL MEDICINE

## 2025-03-25 PROCEDURE — 99214 OFFICE O/P EST MOD 30 MIN: CPT | Performed by: INTERNAL MEDICINE

## 2025-03-25 PROCEDURE — 1123F ACP DISCUSS/DSCN MKR DOCD: CPT | Performed by: INTERNAL MEDICINE

## 2025-03-25 PROCEDURE — G8427 DOCREV CUR MEDS BY ELIG CLIN: HCPCS | Performed by: INTERNAL MEDICINE

## 2025-03-25 PROCEDURE — 1036F TOBACCO NON-USER: CPT | Performed by: INTERNAL MEDICINE

## 2025-03-25 PROCEDURE — 1126F AMNT PAIN NOTED NONE PRSNT: CPT | Performed by: INTERNAL MEDICINE

## 2025-03-25 PROCEDURE — G8419 CALC BMI OUT NRM PARAM NOF/U: HCPCS | Performed by: INTERNAL MEDICINE

## 2025-03-25 RX ORDER — TAMSULOSIN HYDROCHLORIDE 0.4 MG/1
0.4 CAPSULE ORAL DAILY
Qty: 90 CAPSULE | Refills: 1 | Status: SHIPPED | OUTPATIENT
Start: 2025-03-25

## 2025-03-25 RX ORDER — SERTRALINE HYDROCHLORIDE 25 MG/1
25 TABLET, FILM COATED ORAL DAILY
Qty: 90 TABLET | Refills: 1 | Status: SHIPPED | OUTPATIENT
Start: 2025-03-25

## 2025-03-25 SDOH — ECONOMIC STABILITY: FOOD INSECURITY: WITHIN THE PAST 12 MONTHS, THE FOOD YOU BOUGHT JUST DIDN'T LAST AND YOU DIDN'T HAVE MONEY TO GET MORE.: NEVER TRUE

## 2025-03-25 SDOH — ECONOMIC STABILITY: FOOD INSECURITY: WITHIN THE PAST 12 MONTHS, YOU WORRIED THAT YOUR FOOD WOULD RUN OUT BEFORE YOU GOT MONEY TO BUY MORE.: NEVER TRUE

## 2025-03-25 ASSESSMENT — PATIENT HEALTH QUESTIONNAIRE - PHQ9
1. LITTLE INTEREST OR PLEASURE IN DOING THINGS: NOT AT ALL
2. FEELING DOWN, DEPRESSED OR HOPELESS: NOT AT ALL
SUM OF ALL RESPONSES TO PHQ QUESTIONS 1-9: 0

## 2025-03-25 NOTE — PROGRESS NOTES
\"Have you been to the ER, urgent care clinic since your last visit?  Hospitalized since your last visit?\"    NO    “Have you seen or consulted any other health care providers outside our system since your last visit?”    NO         
negative for - dysuria, hematuria, incontinence, pelvic pain or vulvar/vaginal symptoms  MSK: negative for - joint pain, joint swelling or muscle pain  Neuro: negative for - confusion, headaches, seizures or weakness  Derm: negative for - dry skin, hair changes, rash or skin lesion changes      Physical:   Vitals:   Vitals:    03/25/25 1324   BP: 126/66   Pulse: 69   Resp: 18   Temp: 97.8 °F (36.6 °C)   TempSrc: Temporal   SpO2: 98%   Weight: 65.1 kg (143 lb 9.6 oz)   Height: 1.575 m (5' 2\")       Physical Exam  Neurological: Alert, oriented x4, no focal deficit  Head: Normocephalic, atraumatic  Neck: Supple, no abnormalities  Respiratory: Clear to auscultation, no wheezing, rales, or rhonchi  Cardiovascular: Regular rate and rhythm, no murmurs, rubs, or gallops  Extremities: No edema, no cyanosis  Musculoskeletal: No joint or muscular abnormalities  Skin: No rashes or lesions       Exam:   HEENT- atraumatic,normocephalic, awake, oriented, well nourished  Neck - supple,no enlarged lymph nodes, no JVD, no thyromegaly  Chest- CTA, no rhonchi, no crackles  Heart- rrr, no murmurs / gallop/rub  Abdomen- soft,BS+,NT, no hepatosplenomegaly  Ext - no c/c/edema   Neuro- no focal deficits.Power 5/5 all extremities  Skin - warm,dry, no obvious rashes.        Results  Labs   - Blood glucose: 100, 102, 104 mg/dL   - Thyroid function: Normal   - Vitamin B12: Normal   - CBC: Normal   - Iron stores: Normal    Imaging   - Echocardiogram: 07/2024, Normal left ventricle wall thickness, ejection fraction 50-55%, severe hypokinesis of inferolateral basal wall     Review of Data:   LABS:   Lab Results   Component Value Date/Time    WBC 5.3 10/02/2024 11:41 AM    HGB 14.7 10/02/2024 11:41 AM    HCT 45.7 10/02/2024 11:41 AM     10/02/2024 11:41 AM     Lab Results   Component Value Date/Time     10/02/2024 11:41 AM    K 4.1 10/02/2024 11:41 AM     10/02/2024 11:41 AM    CO2 31 10/02/2024 11:41 AM    BUN 18 10/02/2024

## 2025-03-26 ENCOUNTER — OFFICE VISIT (OUTPATIENT)
Age: 85
End: 2025-03-26
Payer: MEDICARE

## 2025-03-26 VITALS
OXYGEN SATURATION: 97 % | SYSTOLIC BLOOD PRESSURE: 120 MMHG | WEIGHT: 142.2 LBS | BODY MASS INDEX: 26.01 KG/M2 | DIASTOLIC BLOOD PRESSURE: 62 MMHG | HEART RATE: 68 BPM

## 2025-03-26 DIAGNOSIS — I21.4 NSTEMI (NON-ST ELEVATED MYOCARDIAL INFARCTION) (HCC): Primary | ICD-10-CM

## 2025-03-26 DIAGNOSIS — Z95.1 S/P CABG X 3: ICD-10-CM

## 2025-03-26 LAB
25(OH)D3 SERPL-MCNC: 14 NG/ML (ref 30–100)
EST. AVERAGE GLUCOSE BLD GHB EST-MCNC: 117 MG/DL
FSH SERPL-ACNC: 6 MIU/ML
HBA1C MFR BLD: 5.7 % (ref 4–5.6)
LH SERPL-ACNC: 4.8 MIU/ML
PROLACTIN SERPL-MCNC: 5.8 NG/ML
PSA SERPL-MCNC: 8.1 NG/ML (ref 0.01–4)

## 2025-03-26 PROCEDURE — 93005 ELECTROCARDIOGRAM TRACING: CPT | Performed by: INTERNAL MEDICINE

## 2025-03-26 PROCEDURE — 99214 OFFICE O/P EST MOD 30 MIN: CPT | Performed by: INTERNAL MEDICINE

## 2025-03-26 PROCEDURE — 1123F ACP DISCUSS/DSCN MKR DOCD: CPT | Performed by: INTERNAL MEDICINE

## 2025-03-26 PROCEDURE — G8419 CALC BMI OUT NRM PARAM NOF/U: HCPCS | Performed by: INTERNAL MEDICINE

## 2025-03-26 PROCEDURE — 93010 ELECTROCARDIOGRAM REPORT: CPT | Performed by: INTERNAL MEDICINE

## 2025-03-26 PROCEDURE — 1126F AMNT PAIN NOTED NONE PRSNT: CPT | Performed by: INTERNAL MEDICINE

## 2025-03-26 PROCEDURE — G8427 DOCREV CUR MEDS BY ELIG CLIN: HCPCS | Performed by: INTERNAL MEDICINE

## 2025-03-26 PROCEDURE — 1159F MED LIST DOCD IN RCRD: CPT | Performed by: INTERNAL MEDICINE

## 2025-03-26 PROCEDURE — 1036F TOBACCO NON-USER: CPT | Performed by: INTERNAL MEDICINE

## 2025-03-26 RX ORDER — AMLODIPINE BESYLATE 5 MG/1
5 TABLET ORAL DAILY
Qty: 90 TABLET | Refills: 0 | Status: SHIPPED | OUTPATIENT
Start: 2025-03-26

## 2025-03-26 RX ORDER — ROSUVASTATIN CALCIUM 20 MG/1
20 TABLET, COATED ORAL DAILY
Qty: 90 TABLET | Refills: 1 | Status: SHIPPED | OUTPATIENT
Start: 2025-03-26

## 2025-03-26 RX ORDER — CYCLOSPORINE 0.5 MG/ML
EMULSION OPHTHALMIC
COMMUNITY
Start: 2025-01-30

## 2025-03-26 NOTE — PATIENT INSTRUCTIONS
Amlodipine 5 mg daily    DECREASE Crestor/ rosuvastatin to 20 mg daily    You will have an echo and if you continue to have symptoms let us know and we may order a stress

## 2025-03-26 NOTE — PROGRESS NOTES
Dr. Alexandru Crook [facility] Cardiology.  [phone]            Cardiology Consult/Progress Note      Requesting/referring provider: Sushant Larsen MDNo ref. provider found     Reason for Consult: Establish care with cardiology    Assessment/Plan:  1.  Coronary disease manifesting the form of non-STEMI in July 2024  2.  Status post coronary bypass grafting with LIMA to LAD, vein graft to OM, vein graft to posterolateral branch of RCA  3.  Preserved LV function  4.  Hypertriglyceridemia  5.  BPH  6.  Postoperative anemia      Matthew Puente is here to establish care with cardiology.  He has significant multivessel coronary artery disease for which she underwent coronary artery bypass grafting in July 2024 with no residual angina.  He is doing well and reports no shortness of breath.  No side effects with any medications reported.  Recommend continuation of Plavix given non-STEMI as initial diagnosis.  He has small areas of incomplete revascularization but does not appear to be symptomatic.  This involves probably distal marginal branch of circumflex.  Nonetheless in the absence of any symptoms distal marginal  does not need revascularization at this time.      Over the past few months he has noted that he is not able to go as far on elliptical or stationary bike as previously.  Uncertain if this is cardiac or related to leg pain.  He feels that it is probably more of calf cramping.  For that reason I will de-escalate rosuvastatin to 20 mg.  His ABIs prior to surgery were normal.  Hence unlikely that this is peripheral arterial disease.    Will perform echocardiogram on next visit to ensure that LV function has recovered.  Otherwise he does not have any drew heart failure symptoms.    Blood pressures have been elevated with systolic blood pressures in the range of high 120s to low 150s.  Will add amlodipine 5 mg daily.  Also recommend discontinuation of Plavix and starting

## 2025-03-27 ENCOUNTER — RESULTS FOLLOW-UP (OUTPATIENT)
Dept: PRIMARY CARE CLINIC | Facility: CLINIC | Age: 85
End: 2025-03-27

## 2025-03-27 DIAGNOSIS — E55.9 VITAMIN D DEFICIENCY: Primary | ICD-10-CM

## 2025-03-27 RX ORDER — ERGOCALCIFEROL 1.25 MG/1
50000 CAPSULE, LIQUID FILLED ORAL WEEKLY
Qty: 12 CAPSULE | Refills: 1 | Status: SHIPPED | OUTPATIENT
Start: 2025-03-27

## 2025-04-01 LAB
TESTOST FREE SERPL-MCNC: 5.6 PG/ML (ref 6.6–18.1)
TESTOST SERPL-MCNC: 325 NG/DL (ref 264–916)

## 2025-04-21 ENCOUNTER — OFFICE VISIT (OUTPATIENT)
Age: 85
End: 2025-04-21
Payer: MEDICARE

## 2025-04-21 VITALS
SYSTOLIC BLOOD PRESSURE: 122 MMHG | HEART RATE: 70 BPM | WEIGHT: 143 LBS | OXYGEN SATURATION: 97 % | HEIGHT: 62 IN | DIASTOLIC BLOOD PRESSURE: 82 MMHG | BODY MASS INDEX: 26.31 KG/M2

## 2025-04-21 DIAGNOSIS — G20.A1 PARKINSON'S DISEASE WITHOUT DYSKINESIA, UNSPECIFIED WHETHER MANIFESTATIONS FLUCTUATE (HCC): Primary | ICD-10-CM

## 2025-04-21 DIAGNOSIS — G31.84 MILD COGNITIVE IMPAIRMENT: ICD-10-CM

## 2025-04-21 PROCEDURE — 1159F MED LIST DOCD IN RCRD: CPT | Performed by: PSYCHIATRY & NEUROLOGY

## 2025-04-21 PROCEDURE — G8419 CALC BMI OUT NRM PARAM NOF/U: HCPCS | Performed by: PSYCHIATRY & NEUROLOGY

## 2025-04-21 PROCEDURE — G8427 DOCREV CUR MEDS BY ELIG CLIN: HCPCS | Performed by: PSYCHIATRY & NEUROLOGY

## 2025-04-21 PROCEDURE — 1036F TOBACCO NON-USER: CPT | Performed by: PSYCHIATRY & NEUROLOGY

## 2025-04-21 PROCEDURE — 99205 OFFICE O/P NEW HI 60 MIN: CPT | Performed by: PSYCHIATRY & NEUROLOGY

## 2025-04-21 PROCEDURE — 1123F ACP DISCUSS/DSCN MKR DOCD: CPT | Performed by: PSYCHIATRY & NEUROLOGY

## 2025-04-21 RX ORDER — CARBIDOPA/LEVODOPA 25MG-250MG
1 TABLET ORAL DAILY
Qty: 90 TABLET | Refills: 3 | Status: SHIPPED | OUTPATIENT
Start: 2025-04-21

## 2025-04-21 ASSESSMENT — PATIENT HEALTH QUESTIONNAIRE - PHQ9
1. LITTLE INTEREST OR PLEASURE IN DOING THINGS: NOT AT ALL
SUM OF ALL RESPONSES TO PHQ QUESTIONS 1-9: 0
2. FEELING DOWN, DEPRESSED OR HOPELESS: NOT AT ALL

## 2025-04-21 NOTE — PROGRESS NOTES
Chief Complaint   Patient presents with    Neurologic Problem     Memory loss, tremors, gait issues       History of Present Illness  The patient is an 84-year-old male presenting with intermittent resting hand tremors. Mobility is largely unaffected, with no changes in gait or falls, but movements are generally slower. Handwriting has deteriorated, and his voice is softer, requiring occasional repetition.    His daughter reports mild cognitive decline, including slower response times and occasional difficulty recalling new information, but he does not forget conversations or names. He lives independently, manages daily tasks, and refills his pillbox regularly, though personal hygiene could be improved. He drives only during daylight hours, with no reported errors or instances of getting lost. No hallucinations are reported.  Underwent open heart surgery in 07/2024. Experienced severe leg pain that resolved after 2 hours of sleep. Sleeps frequently, both at night and during the day, every 2 hours.  Retired .  Used to work for [employer]       Past Medical History:   Diagnosis Date    BPH (benign prostatic hyperplasia)     CAD (coronary artery disease) 07/15/2024    CABG x 3    Constipation     Glaucoma     Snoring      Current Outpatient Medications   Medication Sig    carbidopa-levodopa (SINEMET)  MG per tablet Take 1 tablet by mouth daily    vitamin D (ERGOCALCIFEROL) 1.25 MG (05893 UT) CAPS capsule Take 1 capsule by mouth once a week    cycloSPORINE (RESTASIS) 0.05 % ophthalmic emulsion     amLODIPine (NORVASC) 5 MG tablet Take 1 tablet by mouth daily    rosuvastatin (CRESTOR) 20 MG tablet Take 1 tablet by mouth daily    sertraline (ZOLOFT) 25 MG tablet Take 1 tablet by mouth daily    tamsulosin (FLOMAX) 0.4 MG capsule Take 1 capsule by mouth daily    lubiprostone (AMITIZA) 24 MCG capsule Take 1 capsule by mouth daily (with breakfast)    polyethylene glycol (GLYCOLAX) 17 g packet 17 g DAILY

## 2025-04-21 NOTE — PROGRESS NOTES
Chief Complaint   Patient presents with    Neurologic Problem     Memory loss, tremors, gait issues     Vitals:    04/21/25 1307   BP: 122/82   Pulse: 70   SpO2: 97%

## 2025-04-22 ENCOUNTER — RESULTS FOLLOW-UP (OUTPATIENT)
Age: 85
End: 2025-04-22

## 2025-04-22 LAB
TSH SERPL DL<=0.005 MIU/L-ACNC: 5.16 UIU/ML (ref 0.45–4.5)
VIT B12 SERPL-MCNC: 417 PG/ML (ref 232–1245)

## 2025-04-25 ENCOUNTER — HOSPITAL ENCOUNTER (OUTPATIENT)
Facility: HOSPITAL | Age: 85
Discharge: HOME | End: 2025-04-28
Attending: PSYCHIATRY & NEUROLOGY
Payer: MEDICARE

## 2025-04-25 DIAGNOSIS — G31.84 MILD COGNITIVE IMPAIRMENT: ICD-10-CM

## 2025-04-25 PROCEDURE — 70551 MRI BRAIN STEM W/O DYE: CPT

## 2025-04-25 NOTE — RESULT ENCOUNTER NOTE
Corbin Oconnor MD Javed, Melia CHAUHAN, KERRY  Please inform that lab work showed elevated TSH which may indicate hypothyroidism.  Best to check with Dr. Larsen for further recommendations.    Patient called, LVM with results and Dr. Oconnor's recommendations, requested a call back with any additional questions JL

## 2025-04-28 ENCOUNTER — RESULTS FOLLOW-UP (OUTPATIENT)
Age: 85
End: 2025-04-28

## 2025-06-08 DIAGNOSIS — Z95.1 S/P CABG X 3: ICD-10-CM

## 2025-06-08 DIAGNOSIS — I21.4 NSTEMI (NON-ST ELEVATED MYOCARDIAL INFARCTION) (HCC): ICD-10-CM

## 2025-06-09 RX ORDER — AMLODIPINE BESYLATE 5 MG/1
TABLET ORAL
Qty: 90 TABLET | Refills: 3 | Status: SHIPPED | OUTPATIENT
Start: 2025-06-09

## 2025-06-09 NOTE — TELEPHONE ENCOUNTER
Requested Prescriptions     Signed Prescriptions Disp Refills    amLODIPine (NORVASC) 5 MG tablet 90 tablet 3     Sig: TAKE 1 TABLET BY MOUTH DAILY (GENERIC FOR NORVASC)     Authorizing Provider: LINNEA HORVATH     Ordering User: BROOKS HALL     Future Appointments   Date Time Provider Department Center   9/24/2025  8:00 AM [facility] 3 [facility] AMB   9/24/2025  8:40 AM Linnea Horvath MD [facility] AMB   10/21/2025 10:00 AM [facility]   4/21/2026  8:00 AM [facility]

## 2025-06-17 ENCOUNTER — TELEPHONE (OUTPATIENT)
Age: 85
End: 2025-06-17

## 2025-06-17 NOTE — TELEPHONE ENCOUNTER
TC from pt's daughter on HIPAA, ID verified. States her father is in the chair for an extraction at the dentist office, however he never discontinued his Plavix after the last visit, so they are wanting to postpone due to bleeding risk. She is unsure of why the plavix was not stopped after the last visit, and would like to know if he should still be taking it. Advised I do see in Dr. Avilez note where it should have been stopped, but will send a message to him and his team to be sure. (Per Dr. Horvath's note 3/26/2025: Also recommend discontinuation of Plavix and starting low-dose aspirin.) She would lke to know how long he needs to be off Plavix before having this extraction (advised typically 5-7 days prior). States the dentist will be faxing over a form for us to fill out for cardiac clearance. She would like a call back at [phone].

## 2025-06-17 NOTE — TELEPHONE ENCOUNTER
Faxed clearance and that plavix d/c and patient is taking aspirin which he may hold 5d prior to extractions if needed with fax confirmation

## 2025-06-17 NOTE — TELEPHONE ENCOUNTER
Returned call to daughter and advised that patient may STOP Plavix and should continue aspirin. She verbalizes understanding and requests a [employer] message to be sent as well. Mychart message sent. Patient will have tooth extracted next Thursday 6/26/25. Daughter states that patient will be flying to [place] shortly after and she asks for any special instructions. Advised on DVT prophylaxis including hydration, compression stockings, pumping legs/ walking. Daughter is appreciative with no further questions.

## 2025-08-26 ENCOUNTER — OFFICE VISIT (OUTPATIENT)
Dept: PRIMARY CARE CLINIC | Facility: CLINIC | Age: 85
End: 2025-08-26

## 2025-08-26 VITALS
DIASTOLIC BLOOD PRESSURE: 68 MMHG | TEMPERATURE: 98.1 F | WEIGHT: 139.6 LBS | OXYGEN SATURATION: 100 % | SYSTOLIC BLOOD PRESSURE: 106 MMHG | BODY MASS INDEX: 25.69 KG/M2 | HEART RATE: 64 BPM | RESPIRATION RATE: 12 BRPM | HEIGHT: 62 IN

## 2025-08-26 DIAGNOSIS — H93.12 TINNITUS OF LEFT EAR: ICD-10-CM

## 2025-08-26 DIAGNOSIS — E55.9 VITAMIN D DEFICIENCY: ICD-10-CM

## 2025-08-26 DIAGNOSIS — G20.A1 PARKINSON'S DISEASE, UNSPECIFIED WHETHER DYSKINESIA PRESENT, UNSPECIFIED WHETHER MANIFESTATIONS FLUCTUATE (HCC): ICD-10-CM

## 2025-08-26 DIAGNOSIS — I25.810 CORONARY ARTERY DISEASE INVOLVING CORONARY BYPASS GRAFT OF NATIVE HEART WITHOUT ANGINA PECTORIS: ICD-10-CM

## 2025-08-26 DIAGNOSIS — R35.0 BENIGN PROSTATIC HYPERPLASIA WITH URINARY FREQUENCY: ICD-10-CM

## 2025-08-26 DIAGNOSIS — R25.1 TREMORS OF NERVOUS SYSTEM: ICD-10-CM

## 2025-08-26 DIAGNOSIS — F32.A DEPRESSION, UNSPECIFIED DEPRESSION TYPE: Primary | ICD-10-CM

## 2025-08-26 DIAGNOSIS — N40.1 BENIGN PROSTATIC HYPERPLASIA WITH URINARY FREQUENCY: ICD-10-CM

## 2025-08-26 SDOH — ECONOMIC STABILITY: FOOD INSECURITY: WITHIN THE PAST 12 MONTHS, YOU WORRIED THAT YOUR FOOD WOULD RUN OUT BEFORE YOU GOT MONEY TO BUY MORE.: NEVER TRUE

## 2025-08-26 SDOH — ECONOMIC STABILITY: FOOD INSECURITY: WITHIN THE PAST 12 MONTHS, THE FOOD YOU BOUGHT JUST DIDN'T LAST AND YOU DIDN'T HAVE MONEY TO GET MORE.: NEVER TRUE

## 2025-08-26 ASSESSMENT — PATIENT HEALTH QUESTIONNAIRE - PHQ9
SUM OF ALL RESPONSES TO PHQ QUESTIONS 1-9: 0
2. FEELING DOWN, DEPRESSED OR HOPELESS: NOT AT ALL
1. LITTLE INTEREST OR PLEASURE IN DOING THINGS: NOT AT ALL

## 2025-08-27 RX ORDER — ERGOCALCIFEROL 1.25 MG/1
50000 CAPSULE, LIQUID FILLED ORAL WEEKLY
Qty: 12 CAPSULE | Refills: 0 | Status: SHIPPED | OUTPATIENT
Start: 2025-08-27

## (undated) DEVICE — BLADE OPHTH W1.5MM 15DEG ORNG HNDL SHRP SHRP DEL FOR CATRCT

## (undated) DEVICE — CONNECTOR DRNGE 3/8 1/2X3/16X3/16IN BASE L5MM ARM L10-13MM

## (undated) DEVICE — X-RAY DETECTABLE SPONGES,16 PLY: Brand: VISTEC

## (undated) DEVICE — SUTURE PROL SZ 4-0 L30IN NONABSORBABLE BLU SH-1 L22MM 1/2 8526H

## (undated) DEVICE — LEAD PACE L475MM CHNL A OR V MYOCARDIAL STEROID ELUT SIL

## (undated) DEVICE — TEMP PACING WIRE: Brand: MYO/WIRE

## (undated) DEVICE — DRESSING FOAM W8.7XL9.1IN SAFETAC LAYR SELF ADH MEPILEX

## (undated) DEVICE — GLOVE SURG SZ 8 CRM LTX FREE POLYISOPRENE POLYMER BEAD ANTI

## (undated) DEVICE — PACK PROCEDURE SURG HRT CATH

## (undated) DEVICE — 6 FOOT DISPOSABLE EXTENSION CABLE WITH SAFE CONNECT / SCREW-DOWN

## (undated) DEVICE — SOLUTION IV 1000ML PH 7.4 INJ NRMSOL R

## (undated) DEVICE — SYSTEM ENDOSCP VES HARV W/ TOOL CANN SEAL SHT PRT BLNT TIP

## (undated) DEVICE — SUTURE TICRON DBL ARMED 2 0 CV 305 42IN BLU N ABSRB BRAID 8886303551

## (undated) DEVICE — GLIDESHEATH SLENDER STAINLESS STEEL KIT: Brand: GLIDESHEATH SLENDER

## (undated) DEVICE — PRESSURE MONITORING SET: Brand: TRUWAVE

## (undated) DEVICE — CANNULA PERF 15FR L12.5IN RG STPCOCK WIREWOUND BODY

## (undated) DEVICE — WASTE KIT - ST MARY: Brand: MEDLINE INDUSTRIES, INC.

## (undated) DEVICE — SUTURE PROL SZ 6-0 L24IN NONABSORBABLE BLU L9.3MM BV-1 3/8 8805H

## (undated) DEVICE — LIQUIBAND RAPID ADHESIVE 36/CS 0.8ML: Brand: MEDLINE

## (undated) DEVICE — DRAIN,WOUND,ROUND,24FR,5/16",FULL-FLUTED: Brand: MEDLINE

## (undated) DEVICE — GUIDEWIRE VASC L150CM DIA0.035IN FLX TIP L7CM PTFE STR FIX

## (undated) DEVICE — GLIDESHEATH SLENDER ACCESS KIT: Brand: GLIDESHEATH SLENDER

## (undated) DEVICE — Device: Brand: CLEANCUT ROTATING AORTIC PUNCH

## (undated) DEVICE — KIT AT-X65 ANGIOTOUCH HAND CONTROLLER

## (undated) DEVICE — DRAIN SURG SGL COLL PT TB FOR ATS BG OASIS

## (undated) DEVICE — KIT,ANTI FOG,W/SPONGE & FLUID,SOFT PACK: Brand: MEDLINE

## (undated) DEVICE — SPLINT WR VELC FOAM NEUT POS DISP FOR RAD ART ACC SFT STRP

## (undated) DEVICE — SOLUTION IV 500ML 0.9% SOD CHL PH 5 INJ USP VIAFLX PLAS

## (undated) DEVICE — OPEN HEART B-RICHMOND: Brand: MEDLINE INDUSTRIES, INC.

## (undated) DEVICE — Device: Brand: JELCO

## (undated) DEVICE — CARD SMRT DISP TRANSIT TIME MEDISTEM VERIQ

## (undated) DEVICE — LUER-LOK 360°: Brand: CONNECTA, LUER-LOK

## (undated) DEVICE — KIT HND CTRL 3 W STPCOCK ROT END 54IN PREM HI PRSS TBNG AT

## (undated) DEVICE — SPECIAL PROCEDURE DRAPE 32" X 34": Brand: SPECIAL PROCEDURE DRAPE

## (undated) DEVICE — SYRINGE MED 50ML LUERLOCK TIP

## (undated) DEVICE — PADPRO DEFIBRILLATION/PACING/CARDIOVERSION/MONITORING ELECTRODES, ADULT/CHILD GREATER THAN 10 KG RADIOTRANSPARENT ELECTRODE, PHYSIO-CONTROL QUIK-COMBO (M) 60" (152 CM): Brand: PADPRO

## (undated) DEVICE — 72" ARTERIAL PRESSURE TUBING: Brand: ICU MEDICAL

## (undated) DEVICE — SYRINGE MED 10ML LUERLOCK TIP W/O SFTY DISP

## (undated) DEVICE — SUTURE VICRYL + SZ 0 L18IN ABSRB VLT CT L40MM 1 2 CIR TAPR PNT

## (undated) DEVICE — BLADE,CARBON-STEEL,15,STRL,DISPOSABLE,TB: Brand: MEDLINE

## (undated) DEVICE — HI-TORQUE VERSACORE MODIFIED J GUIDE WIRE SYSTEM 145 CM: Brand: HI-TORQUE VERSACORE

## (undated) DEVICE — 40418 TRENDELENBURG ONE-STEP ARM PROTECTORS LARGE (1 PAIR): Brand: 40418 TRENDELENBURG ONE-STEP ARM PROTECTORS LARGE (1 PAIR)

## (undated) DEVICE — SUTURE MONOCRYL + SZ 3-0 L27IN ABSRB UD PS1 L24MM 3/8 CIR REV MCP936H

## (undated) DEVICE — SUTURE PROL SZ 3-0 L30IN NONABSORBABLE BLU SH-1 L22MM 1/2 8762H

## (undated) DEVICE — TIDISHIELD TRANSPORT, CONTAINMENT COVER FOR BACK TABLE 4'6" (1.37M) TO 8' (2.43M) IN LENGTH: Brand: TIDISHIELD

## (undated) DEVICE — BANDAGE COMPR ELASTIC 5 YDX6 IN

## (undated) DEVICE — AGENT HEMSTAT W4XL4IN OXIDIZED REGENERATED CELOS ABSRB SFT

## (undated) DEVICE — TBG INSUFFLATION LUER LOCK: Brand: MEDLINE INDUSTRIES, INC.

## (undated) DEVICE — SOLUTION IV 1000ML 140MEQ/L SOD 5MEQ/L K 3MEQ/L MG 27MEQ/L

## (undated) DEVICE — COVER,LIGHT,CAMERA,HARD,1/PK,STRL: Brand: MEDLINE

## (undated) DEVICE — KIT BLWR MISTER 5P 15L W/ TBNG SET IRRIG MIST TO IMPROVE

## (undated) DEVICE — TUBING PRSS MON L6IN PVC M FEM CONN

## (undated) DEVICE — SUTURE PROL SZ 7-0 L24IN NONABSORBABLE BLU L8MM BV175-6 3/8 8735H

## (undated) DEVICE — WRAP SURG W1.31XL1.34M CARD FOR PT 165-172CM THERMOWRP

## (undated) DEVICE — 1000ML,PRESSURE INFUSER W/STOPCOCK: Brand: MEDLINE

## (undated) DEVICE — SOLUTION IV 1000ML 0.9% SOD CHL PH 5 INJ USP VIAFLX PLAS

## (undated) DEVICE — SUTURE NONABSORBABLE MONOFILAMENT 7-0 BV-1 1X24 IN PROLENE 8702H

## (undated) DEVICE — ANGIOGRAPHY KIT

## (undated) DEVICE — AVID DUAL STAGE VENOUS DRAINAGE CANNULA: Brand: AVID DUAL STAGE VENOUS DRAINAGE CANNULA

## (undated) DEVICE — GUIDE CATHETER: Brand: RUNWAY™

## (undated) DEVICE — TR BAND RADIAL ARTERY COMPRESSION DEVICE: Brand: TR BAND

## (undated) DEVICE — CANNULA PERF L12IN DIA20FR GWIRE DIA0.038IN ART ELONG 1 PC

## (undated) DEVICE — KIT COR DIAG CATH ANGIO CURVES 6FR JL 4.0 100CM JR 4.0

## (undated) DEVICE — FOGARTY - HYDRAGRIP SURGICAL - CLAMP INSERTS: Brand: FOGARTY SOFTJAW

## (undated) DEVICE — TRANSFER BAG 300 ML: Brand: HAEMONETICS

## (undated) DEVICE — GOWN,SIRUS,NONRNF,SETINSLV,XL,20/CS: Brand: MEDLINE

## (undated) DEVICE — ADHESIVE SKIN CLOSURE XL 42 CM 2.7 CC MESH LIQUIBAND SECUR

## (undated) DEVICE — SEALANT TISS 4 CC FIBRIN VISTASEAL

## (undated) DEVICE — OPEN HEART A- RICHMOND: Brand: MEDLINE INDUSTRIES, INC.

## (undated) DEVICE — KIT MFLD ISOLATN NACL CNTRST PRT TBNG SPIK W/ PRSS TRNSDUC

## (undated) DEVICE — PROVE COVER: Brand: UNBRANDED

## (undated) DEVICE — RADIFOCUS GLIDEWIRE: Brand: GLIDEWIRE

## (undated) DEVICE — SYRINGE MEDICAL 3ML CLEAR PLASTIC STANDARD NON CONTROL LUERLOCK TIP DISPOSABLE

## (undated) DEVICE — PLEDGET SUT SFT OVL 3 8X5 16IN

## (undated) DEVICE — BANDAGE COMPR M W6INXL10YD WHT BGE VELC E MTRX HK AND LOOP

## (undated) DEVICE — KIT MED IMAG CNTRST AGNT W/ IOPAMIDOL REUSE

## (undated) DEVICE — CATHETER DIAG 5FR L100CM CRV TRANSFORMER 3.5 SIDE H 2

## (undated) DEVICE — Device: Brand: OMNIWIRE PRESSURE GUIDE WIRE

## (undated) DEVICE — 3M™ TEGADERM™ TRANSPARENT FILM DRESSING FRAME STYLE, 1626W, 4 IN X 4-3/4 IN (10 CM X 12 CM), 50/CT 4CT/CASE: Brand: 3M™ TEGADERM™

## (undated) DEVICE — SUTURE S STL SZ 6 L18IN NONABSORBABLE SIL L48MM V-40 1/2 M649G